# Patient Record
Sex: FEMALE | Race: WHITE | NOT HISPANIC OR LATINO | Employment: UNEMPLOYED | URBAN - METROPOLITAN AREA
[De-identification: names, ages, dates, MRNs, and addresses within clinical notes are randomized per-mention and may not be internally consistent; named-entity substitution may affect disease eponyms.]

---

## 2018-06-20 ENCOUNTER — CLINICAL SUPPORT (OUTPATIENT)
Dept: GENETICS | Facility: HOSPITAL | Age: 38
End: 2018-06-20

## 2018-06-20 ENCOUNTER — APPOINTMENT (OUTPATIENT)
Dept: LAB | Facility: HOSPITAL | Age: 38
End: 2018-06-20

## 2018-06-20 DIAGNOSIS — C50.919 MALIGNANT NEOPLASM OF FEMALE BREAST, UNSPECIFIED ESTROGEN RECEPTOR STATUS, UNSPECIFIED LATERALITY, UNSPECIFIED SITE OF BREAST (HCC): Primary | ICD-10-CM

## 2018-06-20 DIAGNOSIS — Z13.79 GENETIC TESTING: Primary | ICD-10-CM

## 2018-06-20 DIAGNOSIS — Z80.3 FAMILY HISTORY OF BREAST CANCER: ICD-10-CM

## 2018-06-20 PROCEDURE — 96040: CPT | Performed by: GENETIC COUNSELOR, MS

## 2018-06-20 NOTE — PROGRESS NOTES
Ina Ram is a 38-year old female who was seen for genetic counseling due to a recent breast cancer diagnosis and family history of cancer. Ms. Ram was diagnosed with an invasive ductal breast cancer at age 38. She has not made a surgical decision at this time. Her uterus and ovaries are still intact. Ms. Ram was interested in discussing her risk for a hereditary cancer syndrome, and decided to pursue genetic testing.   Ms. Ram opted to pursue comprehensive testing via the CancerNext panel ordered through [employer] which includes 34 genes that are known to increase the risk of breast cancer and other cancers (genes on this panel include APC, MIRACLE, BARD1, BMPR1A, BRCA1, BRCA2, BRIP1, CDH1, CDK4, CDKN2A, CHEK2, EPCAM, GREM1, MLH1, MRE11A, MSH2, MSH6, MUTYH, NBN, NF1, PALB2, PMS2, POLD1, POLE, PTEN, RAD50, RAD51C, RAD51D, SMAD4, SMARCA4, STK11, and TP53). Results are expected in 2-3 weeks.    PERTINENT FAMILY HISTORY: (See pedigree)   Pat. Cousin:  Breast cancer, 40  Mat. Uncle:  Possible melanoma      RISK ASSESSMENT:  Ms. Ram’s personal history of breast cancer in addition to a family history of breast cancer raises the question of a hereditary cancer syndrome. We discussed BRCA1/2 testing as well as the option of pursuing a panel that would test for other genes known to impact breast cancer risk in addition to BRCA1/2. This risk assessment is based on the family history information provided at the time of the appointment. The assessment could change in the future should new information be obtained.    GENETIC COUNSELING (40 minutes spent with patient): We reviewed the family history information in detail.  Cases of breast cancer follow three general patterns: sporadic, familial, and hereditary. While most cancer is sporadic, some cases appear to occur in family clusters.  These cases are said to be familial and account for 10-20% of breast cancer cases. Familial cases may be due to a  combination of shared genes and environmental factors among family members. In even fewer families, the cancer is said to be inherited, and the genes responsible for the cancer are known.      Family histories typical of hereditary cancer syndromes usually include multiple first- and second-degree relatives diagnosed with cancer types that define a syndrome. These cases tend to be diagnosed at younger-than-expected ages and can be bilateral or multifocal. The cancer in these families follows an autosomal dominant inheritance pattern, which indicates the likely presence of a mutation in a cancer susceptibility gene. Children and siblings of an individual believed to carry this mutation have a 50% chance of inheriting that mutation, thereby inheriting the increased risk to develop cancer.  These mutations can be passed down from the maternal or the paternal lineage.    Hereditary breast cancer accounts for 5-10% of all cases of breast cancer.  A significant proportion of hereditary breast cancer can be attributed to mutations in the BRCA1 and BRCA2 genes.  Mutations in these genes confer an increased risk for breast cancer, ovarian cancer, male breast cancer, prostate cancer and pancreatic cancer.  Women with a BRCA1 or BRCA2 mutation who have already been diagnosed with breast cancer have a 40-60% lifetime risk of a second breast cancer.     There are other genes that are known to be associated with an increased risk for breast cancer.  Some of these genes have well defined cancer risks and established management guidelines.  Other genes that can be tested for have been more recently described, and there may be less data regarding the risks and therefore may not have established management guidelines.  Based on Ms. Ram’s desire to get as much information as possible regarding her personal risks and potential risks for her family, she opted to pursue testing through a panel that would look at several other genes  known to increase the risk for breast cancer and other cancers.    Genetic Testing:  The risks, benefits and limitations of genetic testing and implications for clinical management following testing were reviewed.  DNA test results can influence decisions regarding screening, prevention and surgical management.  Genetic testing can have significant psychological implications for both individuals and families.  Also discussed was the possibility of employment and insurance discrimination based on genetic test results and the laws in place to prevent this (SANJIV), as well as the limitations of these laws.    We discussed panel testing, which would involve testing for BRCA1/2 as well as several other genes at the same time (other breast cancer related genes include MIRACLE, BARD1, BRIP1, CDH1, CHEK2, NBN, NF1, MRE11A, MUTYH, PALB2, PTEN, RAD50, RAD51C, RAD51D, TP53). The benefits and limitations of genetic testing were discussed and Ms. Ram decided to pursue testing via the panel. The implications of a positive or negative test result were discussed. We discussed the possibility that, in some cases, genetic test results may be ambiguous due to the identification of a genetic variant. These variants may or may not be associated with an increased cancer risk. Given her personal history, a negative test result does not eliminate all breast cancer risk to her relatives, although the risk would not be as high as it would with positive genetic testing.        PLAN: Genetic testing should be complete in 2-3 weeks, and Ms. Ram will be contacted by telephone to discuss the results. In the meantime Ms. Ram is welcome to contact us at [phone] with any questions she may have.        Samantha Mccray MS, [facility]       Certified Genetic Counselor

## 2018-06-20 NOTE — PROGRESS NOTES
Ina Ram is a 38-year old female who was seen for genetic counseling due to a recent breast cancer diagnosis and family history of cancer. Ms. Ram was diagnosed with an invasive ductal breast cancer at age 38. She has not made a surgical decision at this time. Her uterus and ovaries are still intact. Ms. Ram was interested in discussing her risk for a hereditary cancer syndrome, and decided to pursue genetic testing.   Ms. Ram opted to pursue comprehensive testing via the CancerNext panel ordered through [employer] which includes 34 genes that are known to increase the risk of breast cancer and other cancers (genes on this panel include APC, MIRACLE, BARD1, BMPR1A, BRCA1, BRCA2, BRIP1, CDH1, CDK4, CDKN2A, CHEK2, EPCAM, GREM1, MLH1, MRE11A, MSH2, MSH6, MUTYH, NBN, NF1, PALB2, PMS2, POLD1, POLE, PTEN, RAD50, RAD51C, RAD51D, SMAD4, SMARCA4, STK11, and TP53). Results are expected in 2-3 weeks.    PERTINENT FAMILY HISTORY: (See pedigree)   Pat. Cousin:  Breast cancer, 40  Mat. Uncle:  Possible melanoma      RISK ASSESSMENT:  Ms. Ram’s personal history of breast cancer in addition to the family history of breast cancer raises the question of a hereditary cancer syndrome. We discussed BRCA1/2 testing as well as the option of pursuing a panel that would test for other genes known to impact breast cancer risk in addition to BRCA1/2. This risk assessment is based on the family history information provided at the time of the appointment. The assessment could change in the future should new information be obtained.    GENETIC COUNSELING (40 minutes spent with patient): We reviewed the family history information in detail.  Cases of breast cancer follow three general patterns: sporadic, familial, and hereditary. While most cancer is sporadic, some cases appear to occur in family clusters.  These cases are said to be familial and account for 10-20% of breast cancer cases. Familial cases may be due to a  combination of shared genes and environmental factors among family members. In even fewer families, the cancer is said to be inherited, and the genes responsible for the cancer are known.      Family histories typical of hereditary cancer syndromes usually include multiple first- and second-degree relatives diagnosed with cancer types that define a syndrome. These cases tend to be diagnosed at younger-than-expected ages and can be bilateral or multifocal. The cancer in these families follows an autosomal dominant inheritance pattern, which indicates the likely presence of a mutation in a cancer susceptibility gene. Children and siblings of an individual believed to carry this mutation have a 50% chance of inheriting that mutation, thereby inheriting the increased risk to develop cancer.  These mutations can be passed down from the maternal or the paternal lineage.    Hereditary breast cancer accounts for 5-10% of all cases of breast cancer.  A significant proportion of hereditary breast cancer can be attributed to mutations in the BRCA1 and BRCA2 genes.  Mutations in these genes confer an increased risk for breast cancer, ovarian cancer, male breast cancer, prostate cancer and pancreatic cancer.  Women with a BRCA1 or BRCA2 mutation who have already been diagnosed with breast cancer have a 40-60% lifetime risk of a second breast cancer.     There are other genes that are known to be associated with an increased risk for breast cancer.  Some of these genes have well defined cancer risks and established management guidelines.  Other genes that can be tested for have been more recently described, and there may be less data regarding the risks and therefore may not have established management guidelines.  Based on Ms. Ram’s desire to get as much information as possible regarding her personal risks and potential risks for her family, she opted to pursue testing through a panel that would look at several other genes  known to increase the risk for breast cancer and other cancers.    Genetic Testing:  The risks, benefits and limitations of genetic testing and implications for clinical management following testing were reviewed.  DNA test results can influence decisions regarding screening, prevention and surgical management.  Genetic testing can have significant psychological implications for both individuals and families.  Also discussed was the possibility of employment and insurance discrimination based on genetic test results and the laws in place to prevent this (SANJIV), as well as the limitations of these laws.    We discussed panel testing, which would involve testing for BRCA1/2 as well as several other genes at the same time (other breast cancer related genes include MIRACLE, BARD1, BRIP1, CDH1, CHEK2, NBN, NF1, MRE11A, MUTYH, PALB2, PTEN, RAD50, RAD51C, RAD51D, TP53). The benefits and limitations of genetic testing were discussed and Ms. Ram decided to pursue testing via the panel. The implications of a positive or negative test result were discussed. We discussed the possibility that, in some cases, genetic test results may be ambiguous due to the identification of a genetic variant. These variants may or may not be associated with an increased cancer risk. Given her personal history, a negative test result does not eliminate all breast cancer risk to her relatives, although the risk would not be as high as it would with positive genetic testing.        PLAN: Genetic testing should be complete in 2-3 weeks, and Ms. Ram will be contacted by telephone to discuss the results. In the meantime Ms. Ram is welcome to contact us at [phone] with any questions she may have.        Alexandra Magnante      Genetic Counseling Student

## 2018-06-21 ENCOUNTER — DOCUMENTATION (OUTPATIENT)
Dept: OTHER | Facility: HOSPITAL | Age: 38
End: 2018-06-21

## 2018-06-21 NOTE — PROGRESS NOTES
6/26/18 I saw patient with Dr PHILIPPE  And patients cousin and sister-in-law. Dr PHILIPPE reviewed the pathology report -  Left breast cancer - HG IDC, ER positive, OR negative and HER 2 negative. The Tumor appears to be about 3cm and is close to the chest wall. An MRI has been ordered and the patient will see Dr Maldonado on Wednesday 6/27/18 @ 2pm. Dr PHILIPPE discussed jovi-adjuvant chemotherapy with the patient and placement of port. Educational and supportive materials were reviewed and given. Arm measurement completed. The patient will see genetics today at 2:30.

## 2018-06-25 ENCOUNTER — APPOINTMENT (OUTPATIENT)
Dept: OTHER | Facility: HOSPITAL | Age: 38
End: 2018-06-25
Attending: SURGERY

## 2018-06-25 ENCOUNTER — HOSPITAL ENCOUNTER (OUTPATIENT)
Dept: MRI IMAGING | Facility: HOSPITAL | Age: 38
Discharge: HOME | End: 2018-06-25
Attending: SURGERY | Admitting: SURGERY

## 2018-06-25 DIAGNOSIS — Z92.89 H/O MAMMOGRAM: ICD-10-CM

## 2018-06-25 DIAGNOSIS — C50.812 MALIGNANT NEOPLASM OF OVERLAPPING SITES OF LEFT FEMALE BREAST (HCC): ICD-10-CM

## 2018-06-25 PROCEDURE — 77059 MRI BREAST BILATERAL DIAGNOSTIC W WO CONTRAST: CPT | Performed by: RADIOLOGY

## 2018-06-25 PROCEDURE — 0159T PR BREAST MRI, COMPUTER AIDED DETECTION: CPT | Performed by: RADIOLOGY

## 2018-06-25 PROCEDURE — C8908 MRI W/O FOL W/CONT, BREAST,: HCPCS

## 2018-06-25 PROCEDURE — 0 GADOBENATE DIMEGLUMINE 529 MG/ML SOLUTION: Performed by: SURGERY

## 2018-06-25 PROCEDURE — A9577 INJ MULTIHANCE: HCPCS | Performed by: SURGERY

## 2018-06-25 RX ADMIN — GADOBENATE DIMEGLUMINE 14 ML: 529 INJECTION, SOLUTION INTRAVENOUS at 14:30

## 2018-06-26 ENCOUNTER — DOCUMENTATION (OUTPATIENT)
Dept: OTHER | Facility: HOSPITAL | Age: 38
End: 2018-06-26

## 2018-06-26 ENCOUNTER — TELEPHONE (OUTPATIENT)
Dept: MRI IMAGING | Facility: HOSPITAL | Age: 38
End: 2018-06-26

## 2018-06-26 ENCOUNTER — LAB REQUISITION (OUTPATIENT)
Dept: LAB | Facility: HOSPITAL | Age: 38
End: 2018-06-26

## 2018-06-26 DIAGNOSIS — C50.412 MALIGNANT NEOPLASM OF UPPER-OUTER QUADRANT OF LEFT FEMALE BREAST (HCC): ICD-10-CM

## 2018-06-26 PROCEDURE — 88321 CONSLTJ&REPRT SLD PREP ELSWR: CPT | Performed by: SURGERY

## 2018-06-26 NOTE — TELEPHONE ENCOUNTER
Called pt with Breast MRI results. Pt recommended to return for additional imaging and possible Stereo BX. I have this scheduled for 6/29 to arrive at 12:15 for a 12:45. Pt requested to have this done on Friday as she has another obligation on Wednesday the 27th.  Pt is not on BT. Pt to call with any further questions or concerns.

## 2018-06-26 NOTE — PROGRESS NOTES
Patients sister in law called to ask about getting patients scans that Dr Maldonado might order scheduled for Tomorrow while she is here seeing Dr Maldonado. I explained that the scans will need to be precerted and that Dr Maldonado will need to see the patient before he orders scans - she verbalized understanding.

## 2018-06-27 ENCOUNTER — EDUCATION (OUTPATIENT)
Dept: ONCOLOGY | Facility: HOSPITAL | Age: 38
End: 2018-06-27

## 2018-06-27 ENCOUNTER — DOCUMENTATION (OUTPATIENT)
Dept: OTHER | Facility: HOSPITAL | Age: 38
End: 2018-06-27

## 2018-06-27 ENCOUNTER — APPOINTMENT (OUTPATIENT)
Dept: MAMMOGRAPHY | Facility: HOSPITAL | Age: 38
End: 2018-06-27
Attending: SURGERY

## 2018-06-27 ENCOUNTER — CONSULT (OUTPATIENT)
Dept: ONCOLOGY | Facility: CLINIC | Age: 38
End: 2018-06-27

## 2018-06-27 ENCOUNTER — APPOINTMENT (OUTPATIENT)
Dept: ULTRASOUND IMAGING | Facility: HOSPITAL | Age: 38
End: 2018-06-27
Attending: SURGERY

## 2018-06-27 VITALS
TEMPERATURE: 98.5 F | DIASTOLIC BLOOD PRESSURE: 81 MMHG | HEIGHT: 69 IN | BODY MASS INDEX: 23.46 KG/M2 | HEART RATE: 70 BPM | SYSTOLIC BLOOD PRESSURE: 130 MMHG | RESPIRATION RATE: 16 BRPM | WEIGHT: 158.4 LBS | OXYGEN SATURATION: 100 %

## 2018-06-27 DIAGNOSIS — C50.011 MALIGNANT NEOPLASM OF NIPPLE OF RIGHT BREAST IN FEMALE, UNSPECIFIED ESTROGEN RECEPTOR STATUS (HCC): Primary | ICD-10-CM

## 2018-06-27 DIAGNOSIS — C50.412 MALIGNANT NEOPLASM OF UPPER-OUTER QUADRANT OF LEFT BREAST IN FEMALE, ESTROGEN RECEPTOR POSITIVE (HCC): ICD-10-CM

## 2018-06-27 DIAGNOSIS — Z17.0 MALIGNANT NEOPLASM OF UPPER-OUTER QUADRANT OF LEFT BREAST IN FEMALE, ESTROGEN RECEPTOR POSITIVE (HCC): ICD-10-CM

## 2018-06-27 PROCEDURE — 99205 OFFICE O/P NEW HI 60 MIN: CPT | Performed by: INTERNAL MEDICINE

## 2018-06-27 RX ORDER — SODIUM CHLORIDE 9 MG/ML
250 INJECTION, SOLUTION INTRAVENOUS ONCE
Status: CANCELLED | OUTPATIENT
Start: 2018-07-09

## 2018-06-27 RX ORDER — PALONOSETRON 0.05 MG/ML
0.25 INJECTION, SOLUTION INTRAVENOUS ONCE
Status: CANCELLED | OUTPATIENT
Start: 2018-07-09

## 2018-06-27 RX ORDER — DOXORUBICIN HYDROCHLORIDE 2 MG/ML
50 INJECTION, SOLUTION INTRAVENOUS ONCE
Status: CANCELLED | OUTPATIENT
Start: 2018-07-09

## 2018-06-27 NOTE — PROGRESS NOTES
ID: 38 y.o. year old female from [facility] [id number]    PCP: Bobbi Love MD    REFERRING PHYSICIAN: Dr. Josie Johnson    Reason for Consultation: Newly diagnosed weakly ER positive invasive ductal carcinoma of the left breast    Dear Dr. PHILIPPE    It is a pleasure to meet Ms. Ram today.  As you know she is a very pleasant 38-year-old lady who presents today for consultation due to newly diagnosed left breast cancer.  She recently found a palpable mass in the left breast and underwent further evaluation with a mammogram and a biopsy performed close to home in [facility].  The biopsy showed a high-grade invasive ductal carcinoma.  This was weakly positive for estrogen receptor and negative for progesterone receptor.  Her HER-2 immunohistochemical staining showed a 1+ staining pattern.  She denies any significant symptoms related to her cancer.  She denies any significant pain and no headaches, no abdominal discomfort or  weight loss.  She presents today after her MRI of the breasts reveal a torn muscle involvement.  She was referred to me for consideration of neoadjuvant chemotherapy.  She does not have any significant complicating medical issues other than her diagnosis of breast cancer.  Her social situation is complicated considering she has 4 children and also works as a speech therapist.      History reviewed. No pertinent past medical history.    Past Surgical History:   Procedure Laterality Date   • TONSILLECTOMY Bilateral 1985       Social History     Social History   • Marital status:      Social History Main Topics   • Smoking status: Never Smoker   • Smokeless tobacco: Never Used   • Alcohol use No   • Drug use: No   • Sexual activity: Defer     Other Topics Concern   • Not on file       Family History   Problem Relation Age of Onset   • No Known Problems Mother    • No Known Problems Father    • Breast cancer Cousin         BRCA, w METS to brain   • Prostate cancer Maternal  Grandfather        Review of Systems:    16 point review of systems was performed and reviewed and scanned into the EMR      Current Outpatient Prescriptions:   •  Acetaminophen (TYLENOL 8 HOUR PO), Take 1 tablet by mouth Daily As Needed., Disp: , Rfl:   •  IBUPROFEN PO, Take 1 tablet by mouth As Needed., Disp: , Rfl:   •  Multiple Vitamins-Minerals (MULTIVITAL PO), Take 1 tablet by mouth Daily., Disp: , Rfl:     No Known Allergies    ECOG SCORE: 0    Objective     Vitals:    06/27/18 1410   BP: 130/81   Pulse: 70   Resp: 16   Temp: 98.5 °F (36.9 °C)   SpO2: 100%       Physical Exam    General: well appearing, in no acute distress  HEENT: sclera anicteric, oropharynx clear, neck is supple  Lymphatics: no cervical, supraclavicular, or axillary adenopathy  Cardiovascular: regular rate and rhythm, no murmurs, rubs or gallops  Lungs: clear to auscultation bilaterally  Abdomen: soft, nontender, nondistended.  No palpable organomegaly  Extremities: no lower extremity edema  Skin: no rashes, lesions, bruising, or petechiae  Msk:  Shows no weakness of the large muscle groups  Psych: Mood is stable    Mri Breast Bilateral Diagnostic W Wo Contrast    Result Date: 6/27/2018  1. The biopsy-proven carcinoma corresponds to a 4.5 cm irregular heterogeneously enhancing mass in the posterior 3:00 distribution of the left breast. The mass involves the pectoralis muscle. A definite marking clip is not seen within the mass on the MR images and there were no post biopsy mammogram films obtained. Therefore, recommend the patient return for mammographic imaging of the left breast to see if a clip has been placed. If a marking clip was not placed then one will be placed at that time. 2. There is a cluster of calcifications in the right lower outer quadrant as described above which may be dermal in origin. However, tangential views were not obtained at the time of the patient's initial workup. Although there is no abnormal MR enhancement  noted on the right it is recommended that the patient return for additional mammographic imaging evaluation to include skin tangential views.  BI-RADS CATEGORY: 0, INCOMPLETE:  NEED ADDITIONAL IMAGING EVALUATION  RECOMMENDATIONS:   Recommend skin tangential views of previously noted calcifications involving the right breast. Also, left exaggerated lateral CC and MLO views to ascertain whether a clip was placed into the biopsy-proven carcinoma.        ASSESSMENT:    1.  Stage IIB high-grade weakly ER positive ductal carcinoma of the left breast.  I reviewed the MRI with her and her friends today.  I think she would be an ideal candidate for neoadjuvant chemotherapy.  I like to have a echo of her heart performed in anticipation of starting her on anthracycline and also a PET scan to make sure she doesn't have distant disease.  If her PET scan is negative then I will treat her with neoadjuvant TAC.  This is given every 3 weeks.  We discussed the common side effects to be expected from the treatment including alopecia, neuropathy, and risk of life threatening infections.  She will also need radiotherapy after surgery.  I anticipate 4-6 cycles of therapy depending on response.  Periodically I will have imaging performed to see where we stand with her disease.  She will have a port placed next week and I plan to treat her next Friday here in [place].  She prefers her treatment in [place].  She will also need to meet with genetic counselor prior to surgery.  He is able to answer all the questions she had for me today.  And I'll see her back my clinic in 1 week to go over the results of her testing.    I spent 60 minutes on the patient's plan and care with more than 50% of time spent counseling.      Thank you for allowing me to participate in the care of this patient.    Yours sincerely,    Noble Blancas MD  [facility]  Hematology and Oncology         Orders Placed This Encounter   Procedures   •  Comprehensive metabolic panel     Standing Status:   Future     Standing Expiration Date:   7/6/2019   • CBC and Differential     Standing Status:   Future     Standing Expiration Date:   7/6/2019     Order Specific Question:   Manual Differential     Answer:   No         Please note that portions of this note may have been completed with a voice recognition program. Efforts were made to edit the dictations, but occasionally words are mistranscribed.

## 2018-06-27 NOTE — PLAN OF CARE
Outpatient Infusion • [address] [facility] • [address] • [place] [id number] • [phone]      CHEMOTHERAPY EDUCATION SHEET    NAME:  Ina Ram      : 1980           DATE: 18    Booklets Given: Chemotherapy and You [x]  Eating Hints [x]    Sexuality/Fertility Books []     Chemotherapy/Biotherapy Education Sheets: (list all that apply)  Docetaxel, doxorubicin, cyclophosphamide, and pegfilgrastim                                                                                                                                                                Chemotherapy Regimen: docetaxel, doxorubicin, and cyclophosphamide every 21 days    TOPICS EDUCATION PROVIDED EDUCATION REINFORCED COMMENTS   ANEMIA:  role of RBC, cause, s/s, ways to manage, role of transfusion [x] [] Discussed the role and function of RBCs and that the patient may feel fatigued. Recommended to remain active during treatment.   THROMBOCYTOPENIA:  role of platelet, cause, s/s, ways to prevent bleeding, things to avoid, when to seek help [x] [] Discussed the role and function of platelets, and that the patient may be at an increased risk of bleeding.   NEUTROPENIA:  role of WBC, cause, infection precautions, s/s of infection, when to call MD [x] [] Discussed the role and function of WBCs, and that the patient may be at an increased risk of infection. Recommended to call MD if temperature greater than 100.4.   NUTRITION & APPETITE CHANGES:  importance of maintaining healthy diet & weight, ways to manage to improve intake, dietary consult, exercise regimen [x] [] Discussed that patient may develop a metallic taste, and to switch to plastic utensils if this occurs. Also discussed that water may taste differently, recommended the use of flavor additives. Counseled on the importance of hydration, and recommended 8-10 glasses of water daily.   DIARRHEA:  causes, s/s of dehydration, ways to manage, dietary changes, when to call MD [x]  [] Counseled on the use of loperamide, and recommended to contact MD if diarrhea persists despite use of anti-diarrheals.   CONSTIPATION:  causes, ways to manage, dietary changes, when to call MD [x] [] Discussed that constipation may occur, recommended docusate and/or Miralax.    NAUSEA & VOMITING:  cause, use of antiemetics, dietary changes, when to call MD [x] [] Counseled on pre-meds as well as home use of ondansetron, advised to contact MD if nausea persists despite use of anti-emetics.   MOUTH SORES:  causes, oral care, ways to manage [x] [] Discussed the possibility of mouth sores, recommended baking soda, salt, and water mouth wash. Advised to contact MD if mouth sores develop.   ALOPECIA:  cause, ways to manage, resources [x] [] Discussed the possibility of hair loss with this regimen, and the possibility of moving forward with a prescription for a wig.   INFERTILITY & SEXUALITY:  causes, fertility preservation options, sexuality changes, ways to manage, importance of birth control [x] [] Recommended the use of proper contraception for 48hrs after chemotherapy.    NERVOUS SYSTEM CHANGES:  causes, s/s, neuropathies, cognitive changes, ways to manage [x] [] Discussed the possibility of peripheral neuropathy, and to contact the MD if it occurs.   PAIN:  causes, ways to manage [] [] ????   SKIN & NAIL CHANGES:  cause, s/s, ways to manage [x] [] Discussed that the patient is at an increased risk of sunburn, counseled on the appropriate use of sunscreen.   ORGAN TOXICITIES:  cause, s/s, need for diagnostic tests, labs, when to notify MD [x] [] Discussed that we would be monitoring liver and kidney function, and blood counts periodically throughout chemotherapy.   SURVIVORSHIP:  distress, distress assessment, secondary malignancies, early/late effects, follow-up, social issues, social support [] []    HOME CARE:  use of spill kits, storing of PO chemo, how to manage bodily fluids [x] [] Discussed proper laundering  of soiled clothing and linens, and that those cleaning bodily fluids should wear gloves. Also discussed flushing the toilet twice after chemotherapy to help decrease exposure to others in the household.   MISCELLANEOUS:  drug interactions, administration, vesicant, et [x] [] Discussed the length of infusion, frequency of treatments, potential for dying of bodily fluids with doxorubicin, use of dexamethasone and EMLA cream at home, and that the patient was unlikely to experience hemorrhagic cystitis.     Referrals:    Notes: Gave the patient Nelly's business card and advise to call with any questions. The patient was anxious and had many questions related to her chemotherapy. She expressed interest in meeting with a dietician before chemotherapy begins. The patient expressed that all of her questions had been answered by the end of the session, but was still unsure whether she wanted to receive her chemotherapy in [place] or [place]. Additionally the patient inquired about the use of a new prescription for alprazolam. Encouraged the patient to have a  if she decided to take a dose before or during chemotherapy. Additionally, the patient expressed an interest to speak to another representative from pharmacy to go over the education again before her first infusion.

## 2018-06-28 ENCOUNTER — DOCUMENTATION (OUTPATIENT)
Dept: GENETICS | Facility: HOSPITAL | Age: 38
End: 2018-06-28

## 2018-06-29 ENCOUNTER — HOSPITAL ENCOUNTER (OUTPATIENT)
Dept: MAMMOGRAPHY | Facility: HOSPITAL | Age: 38
Discharge: HOME | End: 2018-06-29
Attending: SURGERY

## 2018-06-29 ENCOUNTER — HOSPITAL ENCOUNTER (OUTPATIENT)
Dept: MAMMOGRAPHY | Facility: HOSPITAL | Age: 38
Discharge: HOME | End: 2018-06-29
Attending: SURGERY | Admitting: SURGERY

## 2018-06-29 ENCOUNTER — HOSPITAL ENCOUNTER (OUTPATIENT)
Dept: ULTRASOUND IMAGING | Facility: HOSPITAL | Age: 38
Discharge: HOME | End: 2018-06-29
Attending: SURGERY

## 2018-06-29 ENCOUNTER — DOCUMENTATION (OUTPATIENT)
Dept: OTHER | Facility: HOSPITAL | Age: 38
End: 2018-06-29

## 2018-06-29 DIAGNOSIS — C50.812 MALIGNANT NEOPLASM OF OVERLAPPING SITES OF LEFT FEMALE BREAST (HCC): ICD-10-CM

## 2018-06-29 PROCEDURE — G0279 TOMOSYNTHESIS, MAMMO: HCPCS

## 2018-06-29 PROCEDURE — 77066 DX MAMMO INCL CAD BI: CPT | Performed by: RADIOLOGY

## 2018-06-29 PROCEDURE — 77066 DX MAMMO INCL CAD BI: CPT

## 2018-06-29 PROCEDURE — 77062 BREAST TOMOSYNTHESIS BI: CPT | Performed by: RADIOLOGY

## 2018-06-29 NOTE — PROGRESS NOTES
Patient called to say that she saw her dentist, Dr. Dre Aceves in [facility]. She has 2 cracked teeth that Dr Aceves said had root canals previously and would be okay. She has a cavity that Dr Aceves said he would not be able to save and would need to be extracted at some point. The patient stated that the tooth does not hurt her nor does it seem to be infected - I talked to Dr Maldonado and he said it would be okay for patient to have tooth extracted after her first infusion on 7/9/18 but before her second infusion. I will notify the patient.

## 2018-07-02 ENCOUNTER — HOSPITAL ENCOUNTER (OUTPATIENT)
Dept: PET IMAGING | Facility: HOSPITAL | Age: 38
Discharge: HOME | End: 2018-07-02
Attending: INTERNAL MEDICINE

## 2018-07-02 ENCOUNTER — APPOINTMENT (OUTPATIENT)
Dept: PET IMAGING | Facility: HOSPITAL | Age: 38
End: 2018-07-02
Attending: INTERNAL MEDICINE

## 2018-07-02 ENCOUNTER — HOSPITAL ENCOUNTER (OUTPATIENT)
Dept: CARDIOLOGY | Facility: HOSPITAL | Age: 38
Discharge: HOME | End: 2018-07-02
Attending: INTERNAL MEDICINE | Admitting: INTERNAL MEDICINE

## 2018-07-02 ENCOUNTER — TELEPHONE (OUTPATIENT)
Dept: SOCIAL WORK | Facility: HOSPITAL | Age: 38
End: 2018-07-02

## 2018-07-02 ENCOUNTER — DOCUMENTATION (OUTPATIENT)
Dept: GENETICS | Facility: HOSPITAL | Age: 38
End: 2018-07-02

## 2018-07-02 DIAGNOSIS — C50.011 MALIGNANT NEOPLASM OF NIPPLE OF RIGHT BREAST IN FEMALE, UNSPECIFIED ESTROGEN RECEPTOR STATUS (HCC): ICD-10-CM

## 2018-07-02 LAB
BH CV ECHO MEAS - AO ROOT AREA (BSA CORRECTED): 1.3
BH CV ECHO MEAS - AO ROOT AREA: 4.6 CM^2
BH CV ECHO MEAS - AO ROOT DIAM: 2.4 CM
BH CV ECHO MEAS - BSA(HAYCOCK): 1.9 M^2
BH CV ECHO MEAS - BSA: 1.9 M^2
BH CV ECHO MEAS - BZI_BMI: 23.3 KILOGRAMS/M^2
BH CV ECHO MEAS - BZI_METRIC_HEIGHT: 175.3 CM
BH CV ECHO MEAS - BZI_METRIC_WEIGHT: 71.7 KG
BH CV ECHO MEAS - EDV(CUBED): 83.1 ML
BH CV ECHO MEAS - EDV(TEICH): 86 ML
BH CV ECHO MEAS - EF(CUBED): 78.6 %
BH CV ECHO MEAS - EF(TEICH): 71.1 %
BH CV ECHO MEAS - ESV(CUBED): 17.8 ML
BH CV ECHO MEAS - ESV(TEICH): 24.8 ML
BH CV ECHO MEAS - FS: 40.2 %
BH CV ECHO MEAS - IVS/LVPW: 1
BH CV ECHO MEAS - IVSD: 0.84 CM
BH CV ECHO MEAS - LA DIMENSION: 2.4 CM
BH CV ECHO MEAS - LA/AO: 0.98
BH CV ECHO MEAS - LV MASS(C)D: 113.7 GRAMS
BH CV ECHO MEAS - LV MASS(C)DI: 60.8 GRAMS/M^2
BH CV ECHO MEAS - LVIDD: 4.4 CM
BH CV ECHO MEAS - LVIDS: 2.6 CM
BH CV ECHO MEAS - LVPWD: 0.82 CM
BH CV ECHO MEAS - MV A MAX VEL: 44.9 CM/SEC
BH CV ECHO MEAS - MV DEC SLOPE: 403.4 CM/SEC^2
BH CV ECHO MEAS - MV DEC TIME: 0.21 SEC
BH CV ECHO MEAS - MV E MAX VEL: 92.8 CM/SEC
BH CV ECHO MEAS - MV E/A: 2.1
BH CV ECHO MEAS - MV P1/2T MAX VEL: 108.6 CM/SEC
BH CV ECHO MEAS - MV P1/2T: 78.9 MSEC
BH CV ECHO MEAS - MVA P1/2T LCG: 2 CM^2
BH CV ECHO MEAS - MVA(P1/2T): 2.8 CM^2
BH CV ECHO MEAS - PA ACC SLOPE: 564.1 CM/SEC^2
BH CV ECHO MEAS - PA ACC TIME: 0.15 SEC
BH CV ECHO MEAS - PA PR(ACCEL): 12.5 MMHG
BH CV ECHO MEAS - RAP SYSTOLE: 3 MMHG
BH CV ECHO MEAS - RV MAX PG: 1 MMHG
BH CV ECHO MEAS - RV V1 MAX: 50.8 CM/SEC
BH CV ECHO MEAS - RVSP: 12 MMHG
BH CV ECHO MEAS - SI(CUBED): 35 ML/M^2
BH CV ECHO MEAS - SI(TEICH): 32.7 ML/M^2
BH CV ECHO MEAS - SV(CUBED): 65.4 ML
BH CV ECHO MEAS - SV(TEICH): 61.2 ML
BH CV ECHO MEAS - TAPSE (>1.6): 2.2 CM2
BH CV ECHO MEAS - TR MAX VEL: 145.4 CM/SEC
BH CV XLRA - RV BASE: 3.2 CM
BH CV XLRA - RV LENGTH: 6.1 CM
BH CV XLRA - RV MID: 2.4 CM
BH CV XLRA - TDI S': 13.3 CM/SEC
GLUCOSE BLDC GLUCOMTR-MCNC: 84 MG/DL (ref 70–130)
LEFT ATRIUM VOLUME INDEX: 16 ML/M2
LEFT ATRIUM VOLUME: 30 CM3
LV EF 2D ECHO EST: 65 %
MAXIMAL PREDICTED HEART RATE: 182 BPM
STRESS TARGET HR: 155 BPM

## 2018-07-02 PROCEDURE — 0 FLUDEOXYGLUCOSE F18 SOLUTION: Performed by: INTERNAL MEDICINE

## 2018-07-02 PROCEDURE — A9552 F18 FDG: HCPCS | Performed by: INTERNAL MEDICINE

## 2018-07-02 PROCEDURE — 93306 TTE W/DOPPLER COMPLETE: CPT | Performed by: INTERNAL MEDICINE

## 2018-07-02 PROCEDURE — 82962 GLUCOSE BLOOD TEST: CPT

## 2018-07-02 PROCEDURE — 93306 TTE W/DOPPLER COMPLETE: CPT

## 2018-07-02 PROCEDURE — 78815 PET IMAGE W/CT SKULL-THIGH: CPT

## 2018-07-02 RX ADMIN — FLUDEOXYGLUCOSE F18 1 DOSE: 300 INJECTION INTRAVENOUS at 13:59

## 2018-07-02 NOTE — TELEPHONE ENCOUNTER
ABELARDO received a phone call from pt cousin, Marcia, inquiring about financial and support resources that may be available for pt.  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  Hers is the sole income in the household.  Pt does not have any benefits from her employer.  Pt  is currently unable to work and has no income.  SW provided information about applying for Social Security disability to determine if pt condition warrants that.  ABELARDO discussed various financial resources and will go over in further detail at pt first chemo treatment.  ABELARDO informed of various resources for wigs and scarves, etc.  Marcia requested for pt to stay at [facility] for her appointment on Friday.  ABELARDO completed and faxed in referral for 7/5-7/6.  ABELARDO informed of various support services that are available at [facility] as well.  ABELARDO encouraged for pt to apply at local Medicaid office for any assistance for which she and her family may be eligible.  Pt has a supportive group of family and friends.  They will be assisting her with meals, taking her children to their activities and appointments, and bringing her to her medical appointments.  ABELARDO encouraged pt or her family members to call at any time with further questions or concerns.  SW available for ongoing support and resource needs.

## 2018-07-03 ENCOUNTER — TRANSCRIBE ORDERS (OUTPATIENT)
Dept: MAMMOGRAPHY | Facility: HOSPITAL | Age: 38
End: 2018-07-03

## 2018-07-03 DIAGNOSIS — R92.8 ABNORMAL MAMMOGRAM: Primary | ICD-10-CM

## 2018-07-05 ENCOUNTER — TRANSCRIBE ORDERS (OUTPATIENT)
Dept: ADMINISTRATIVE | Facility: HOSPITAL | Age: 38
End: 2018-07-05

## 2018-07-05 DIAGNOSIS — C50.812 MALIGNANT NEOPLASM OF OVERLAPPING SITES OF LEFT FEMALE BREAST, UNSPECIFIED ESTROGEN RECEPTOR STATUS (HCC): Primary | ICD-10-CM

## 2018-07-05 LAB
CYTO UR: NORMAL
LAB AP CASE REPORT: NORMAL
LAB AP DIAGNOSIS COMMENT: NORMAL
PATH REPORT.FINAL DX SPEC: NORMAL
PATH REPORT.GROSS SPEC: NORMAL

## 2018-07-06 ENCOUNTER — HOSPITAL ENCOUNTER (OUTPATIENT)
Dept: GENERAL RADIOLOGY | Facility: HOSPITAL | Age: 38
Discharge: HOME | End: 2018-07-06
Attending: SURGERY

## 2018-07-06 ENCOUNTER — HOSPITAL ENCOUNTER (OUTPATIENT)
Dept: ULTRASOUND IMAGING | Facility: HOSPITAL | Age: 38
Discharge: HOME | End: 2018-07-06
Attending: SURGERY | Admitting: RADIOLOGY

## 2018-07-06 ENCOUNTER — HOSPITAL ENCOUNTER (OUTPATIENT)
Dept: MAMMOGRAPHY | Facility: HOSPITAL | Age: 38
Discharge: HOME | End: 2018-07-06

## 2018-07-06 DIAGNOSIS — R92.8 ABNORMAL MAMMOGRAM: ICD-10-CM

## 2018-07-06 DIAGNOSIS — Z17.0 MALIGNANT NEOPLASM OF UPPER-OUTER QUADRANT OF LEFT BREAST IN FEMALE, ESTROGEN RECEPTOR POSITIVE (HCC): Primary | ICD-10-CM

## 2018-07-06 DIAGNOSIS — C50.412 MALIGNANT NEOPLASM OF UPPER-OUTER QUADRANT OF LEFT BREAST IN FEMALE, ESTROGEN RECEPTOR POSITIVE (HCC): ICD-10-CM

## 2018-07-06 DIAGNOSIS — Z17.0 MALIGNANT NEOPLASM OF UPPER-OUTER QUADRANT OF LEFT BREAST IN FEMALE, ESTROGEN RECEPTOR POSITIVE (HCC): ICD-10-CM

## 2018-07-06 DIAGNOSIS — C50.812 MALIGNANT NEOPLASM OF OVERLAPPING SITES OF LEFT FEMALE BREAST, UNSPECIFIED ESTROGEN RECEPTOR STATUS (HCC): ICD-10-CM

## 2018-07-06 DIAGNOSIS — C50.412 MALIGNANT NEOPLASM OF UPPER-OUTER QUADRANT OF LEFT BREAST IN FEMALE, ESTROGEN RECEPTOR POSITIVE (HCC): Primary | ICD-10-CM

## 2018-07-06 PROCEDURE — 19285 PERQ DEV BREAST 1ST US IMAG: CPT | Performed by: RADIOLOGY

## 2018-07-06 PROCEDURE — 77065 DX MAMMO INCL CAD UNI: CPT | Performed by: RADIOLOGY

## 2018-07-06 PROCEDURE — 71045 X-RAY EXAM CHEST 1 VIEW: CPT

## 2018-07-06 PROCEDURE — A4648 IMPLANTABLE TISSUE MARKER: HCPCS

## 2018-07-06 PROCEDURE — 76642 ULTRASOUND BREAST LIMITED: CPT

## 2018-07-06 RX ORDER — DEXAMETHASONE 4 MG/1
TABLET ORAL
Qty: 12 TABLET | Refills: 3 | Status: SHIPPED | OUTPATIENT
Start: 2018-07-06 | End: 2018-07-06 | Stop reason: SDUPTHER

## 2018-07-06 RX ORDER — LIDOCAINE HYDROCHLORIDE 10 MG/ML
5 INJECTION, SOLUTION INFILTRATION; PERINEURAL ONCE
Status: COMPLETED | OUTPATIENT
Start: 2018-07-06 | End: 2018-07-06

## 2018-07-06 RX ORDER — DEXAMETHASONE 4 MG/1
TABLET ORAL
Qty: 12 TABLET | Refills: 5 | Status: SHIPPED | OUTPATIENT
Start: 2018-07-06 | End: 2018-11-26

## 2018-07-06 RX ORDER — LIDOCAINE AND PRILOCAINE 25; 25 MG/G; MG/G
CREAM TOPICAL AS NEEDED
Qty: 30 G | Refills: 5 | Status: SHIPPED | OUTPATIENT
Start: 2018-07-06 | End: 2019-06-21

## 2018-07-06 RX ORDER — LIDOCAINE AND PRILOCAINE 25; 25 MG/G; MG/G
CREAM TOPICAL AS NEEDED
Qty: 30 G | Refills: 5 | Status: SHIPPED | OUTPATIENT
Start: 2018-07-06 | End: 2018-07-06 | Stop reason: SDUPTHER

## 2018-07-06 RX ORDER — ONDANSETRON HYDROCHLORIDE 8 MG/1
8 TABLET, FILM COATED ORAL 3 TIMES DAILY PRN
Qty: 30 TABLET | Refills: 5 | Status: SHIPPED | OUTPATIENT
Start: 2018-07-06 | End: 2018-07-06 | Stop reason: SDUPTHER

## 2018-07-06 RX ORDER — DEXAMETHASONE 4 MG/1
TABLET ORAL
Qty: 12 TABLET | Refills: 5 | Status: SHIPPED | OUTPATIENT
Start: 2018-07-06 | End: 2018-07-06 | Stop reason: SDUPTHER

## 2018-07-06 RX ORDER — ONDANSETRON HYDROCHLORIDE 8 MG/1
8 TABLET, FILM COATED ORAL 3 TIMES DAILY PRN
Qty: 30 TABLET | Refills: 5 | Status: SHIPPED | OUTPATIENT
Start: 2018-07-06 | End: 2018-11-26

## 2018-07-06 RX ADMIN — LIDOCAINE HYDROCHLORIDE 2 ML: 10 INJECTION, SOLUTION INFILTRATION; PERINEURAL at 15:18

## 2018-07-09 ENCOUNTER — INFUSION (OUTPATIENT)
Dept: ONCOLOGY | Facility: HOSPITAL | Age: 38
End: 2018-07-09

## 2018-07-09 ENCOUNTER — EDUCATION (OUTPATIENT)
Dept: ONCOLOGY | Facility: HOSPITAL | Age: 38
End: 2018-07-09

## 2018-07-09 ENCOUNTER — DOCUMENTATION (OUTPATIENT)
Dept: NUTRITION | Facility: HOSPITAL | Age: 38
End: 2018-07-09

## 2018-07-09 ENCOUNTER — DOCUMENTATION (OUTPATIENT)
Dept: SOCIAL WORK | Facility: HOSPITAL | Age: 38
End: 2018-07-09

## 2018-07-09 VITALS
RESPIRATION RATE: 16 BRPM | BODY MASS INDEX: 23.11 KG/M2 | DIASTOLIC BLOOD PRESSURE: 68 MMHG | SYSTOLIC BLOOD PRESSURE: 136 MMHG | HEIGHT: 69 IN | WEIGHT: 156 LBS | TEMPERATURE: 99.2 F | HEART RATE: 94 BPM

## 2018-07-09 DIAGNOSIS — C50.412 MALIGNANT NEOPLASM OF UPPER-OUTER QUADRANT OF LEFT BREAST IN FEMALE, ESTROGEN RECEPTOR POSITIVE (HCC): Primary | ICD-10-CM

## 2018-07-09 DIAGNOSIS — Z17.0 MALIGNANT NEOPLASM OF UPPER-OUTER QUADRANT OF LEFT BREAST IN FEMALE, ESTROGEN RECEPTOR POSITIVE (HCC): Primary | ICD-10-CM

## 2018-07-09 LAB
ALBUMIN SERPL-MCNC: 4.53 G/DL (ref 3.2–4.8)
ALBUMIN/GLOB SERPL: 1.8 G/DL (ref 1.5–2.5)
ALP SERPL-CCNC: 50 U/L (ref 25–100)
ALT SERPL W P-5'-P-CCNC: 16 U/L (ref 7–40)
ANION GAP SERPL CALCULATED.3IONS-SCNC: 7 MMOL/L (ref 3–11)
AST SERPL-CCNC: 12 U/L (ref 0–33)
BILIRUB SERPL-MCNC: 0.3 MG/DL (ref 0.3–1.2)
BUN BLD-MCNC: 8 MG/DL (ref 9–23)
BUN/CREAT SERPL: 12.7 (ref 7–25)
CALCIUM SPEC-SCNC: 9.4 MG/DL (ref 8.7–10.4)
CHLORIDE SERPL-SCNC: 108 MMOL/L (ref 99–109)
CO2 SERPL-SCNC: 24 MMOL/L (ref 20–31)
CREAT BLD-MCNC: 0.63 MG/DL (ref 0.6–1.3)
ERYTHROCYTE [DISTWIDTH] IN BLOOD BY AUTOMATED COUNT: 12.9 % (ref 11.3–14.5)
GFR SERPL CREATININE-BSD FRML MDRD: 106 ML/MIN/1.73
GLOBULIN UR ELPH-MCNC: 2.5 GM/DL
GLUCOSE BLD-MCNC: 193 MG/DL (ref 70–100)
HCT VFR BLD AUTO: 35.8 % (ref 34.5–44)
HGB BLD-MCNC: 11.9 G/DL (ref 11.5–15.5)
LYMPHOCYTES # BLD AUTO: 0.9 10*3/MM3 (ref 0.6–4.8)
LYMPHOCYTES NFR BLD AUTO: 5.4 % (ref 24–44)
MCH RBC QN AUTO: 29.6 PG (ref 27–31)
MCHC RBC AUTO-ENTMCNC: 33.2 G/DL (ref 32–36)
MCV RBC AUTO: 89.1 FL (ref 80–99)
MONOCYTES # BLD AUTO: 0.3 10*3/MM3 (ref 0–1)
MONOCYTES NFR BLD AUTO: 1.7 % (ref 0–12)
NEUTROPHILS # BLD AUTO: 15 10*3/MM3 (ref 1.5–8.3)
NEUTROPHILS NFR BLD AUTO: 92.9 % (ref 41–71)
PLATELET # BLD AUTO: 200 10*3/MM3 (ref 150–450)
PMV BLD AUTO: 8.6 FL (ref 6–12)
POTASSIUM BLD-SCNC: 3.6 MMOL/L (ref 3.5–5.5)
PROT SERPL-MCNC: 7 G/DL (ref 5.7–8.2)
RBC # BLD AUTO: 4.02 10*6/MM3 (ref 3.89–5.14)
SODIUM BLD-SCNC: 139 MMOL/L (ref 132–146)
WBC NRBC COR # BLD: 16.2 10*3/MM3 (ref 3.5–10.8)

## 2018-07-09 PROCEDURE — 25010000002 DOXORUBICIN PER 10 MG: Performed by: INTERNAL MEDICINE

## 2018-07-09 PROCEDURE — 96417 CHEMO IV INFUS EACH ADDL SEQ: CPT

## 2018-07-09 PROCEDURE — 25010000002 FOSAPREPITANT PER 1 MG: Performed by: INTERNAL MEDICINE

## 2018-07-09 PROCEDURE — 96411 CHEMO IV PUSH ADDL DRUG: CPT

## 2018-07-09 PROCEDURE — 25010000002 PEGFILGRASTIM 6 MG/0.6ML PREFILLED SYRINGE KIT: Performed by: INTERNAL MEDICINE

## 2018-07-09 PROCEDURE — 85025 COMPLETE CBC W/AUTO DIFF WBC: CPT

## 2018-07-09 PROCEDURE — 25010000002 CYCLOPHOSPHAMIDE PER 100 MG: Performed by: INTERNAL MEDICINE

## 2018-07-09 PROCEDURE — 96375 TX/PRO/DX INJ NEW DRUG ADDON: CPT

## 2018-07-09 PROCEDURE — 25010000002 HEPARIN FLUSH (PORCINE) 100 UNIT/ML SOLUTION: Performed by: INTERNAL MEDICINE

## 2018-07-09 PROCEDURE — 25010000002 PALONOSETRON PER 25 MCG: Performed by: INTERNAL MEDICINE

## 2018-07-09 PROCEDURE — 96366 THER/PROPH/DIAG IV INF ADDON: CPT

## 2018-07-09 PROCEDURE — 80053 COMPREHEN METABOLIC PANEL: CPT

## 2018-07-09 PROCEDURE — 25010000002 DOCETAXEL 20 MG/ML SOLUTION 1 ML VIAL: Performed by: INTERNAL MEDICINE

## 2018-07-09 PROCEDURE — 96415 CHEMO IV INFUSION ADDL HR: CPT

## 2018-07-09 PROCEDURE — 96377 APPLICATON ON-BODY INJECTOR: CPT

## 2018-07-09 PROCEDURE — 96413 CHEMO IV INFUSION 1 HR: CPT

## 2018-07-09 PROCEDURE — 25010000002 DOCETAXEL 20 MG/ML SOLUTION 4 ML VIAL: Performed by: INTERNAL MEDICINE

## 2018-07-09 PROCEDURE — 86300 IMMUNOASSAY TUMOR CA 15-3: CPT

## 2018-07-09 PROCEDURE — 96367 TX/PROPH/DG ADDL SEQ IV INF: CPT

## 2018-07-09 RX ORDER — SODIUM CHLORIDE 0.9 % (FLUSH) 0.9 %
10 SYRINGE (ML) INJECTION AS NEEDED
Status: DISCONTINUED | OUTPATIENT
Start: 2018-07-09 | End: 2018-07-09 | Stop reason: HOSPADM

## 2018-07-09 RX ORDER — SODIUM CHLORIDE 9 MG/ML
250 INJECTION, SOLUTION INTRAVENOUS ONCE
Status: COMPLETED | OUTPATIENT
Start: 2018-07-09 | End: 2018-07-09

## 2018-07-09 RX ORDER — DOXORUBICIN HYDROCHLORIDE 2 MG/ML
50 INJECTION, SOLUTION INTRAVENOUS ONCE
Status: COMPLETED | OUTPATIENT
Start: 2018-07-09 | End: 2018-07-09

## 2018-07-09 RX ORDER — PALONOSETRON 0.05 MG/ML
0.25 INJECTION, SOLUTION INTRAVENOUS ONCE
Status: COMPLETED | OUTPATIENT
Start: 2018-07-09 | End: 2018-07-09

## 2018-07-09 RX ADMIN — SODIUM CHLORIDE 150 MG: 9 INJECTION, SOLUTION INTRAVENOUS at 11:59

## 2018-07-09 RX ADMIN — Medication 10 ML: at 15:06

## 2018-07-09 RX ADMIN — PEGFILGRASTIM 6 MG: KIT SUBCUTANEOUS at 14:49

## 2018-07-09 RX ADMIN — DOXORUBICIN HYDROCHLORIDE 94 MG: 2 INJECTION, SOLUTION INTRAVENOUS at 12:54

## 2018-07-09 RX ADMIN — CYCLOPHOSPHAMIDE 940 MG: 1 INJECTION, POWDER, FOR SOLUTION INTRAVENOUS; ORAL at 13:14

## 2018-07-09 RX ADMIN — DOCETAXEL 140 MG: 20 INJECTION, SOLUTION, CONCENTRATE INTRAVENOUS at 13:58

## 2018-07-09 RX ADMIN — PALONOSETRON HYDROCHLORIDE 0.25 MG: 0.25 INJECTION INTRAVENOUS at 11:57

## 2018-07-09 RX ADMIN — HEPARIN 500 UNITS: 100 SYRINGE at 15:06

## 2018-07-09 RX ADMIN — SODIUM CHLORIDE 250 ML: 9 INJECTION, SOLUTION INTRAVENOUS at 11:54

## 2018-07-09 NOTE — PROGRESS NOTES
SW met with pt during her first infusion to provide support and resources.  Pt lives in [place], [place] and has four children.  Pt has good support from her family and friends.  Pt is not currently able to work.  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  SW provided support to pt and informed her of the role of oncology social work.  ABELARDO provided a personal health manager from [employer], as well as referral information for Cancer Care and [place] [employer] for immediate financial assistance, information about wigs and head coverings.  SW provided contact information for future needs or concerns.  SW available for ongoing support and resource needs.

## 2018-07-09 NOTE — PLAN OF CARE
Outpatient Infusion • [address] [facility] • [address] • [place] [id number] • [phone]      CHEMOTHERAPY EDUCATION SHEET    NAME:  Ina Ram      : 1980           DATE: 18    Booklets Given: Chemotherapy and You []  Eating Hints []    Sexuality/Fertility Books []     Chemotherapy/Biotherapy Education Sheets: (list all that apply)                                                                                                                                                                Chemotherapy Regimen: docetaxel, doxorubicin, cyclophosphamide every 21 days    TOPICS EDUCATION PROVIDED EDUCATION REINFORCED COMMENTS   ANEMIA:  role of RBC, cause, s/s, ways to manage, role of transfusion [x] [] Discussed the role and function of RBCs and that the patient may feel fatigued. Recommended to remain active during treatment.   THROMBOCYTOPENIA:  role of platelet, cause, s/s, ways to prevent bleeding, things to avoid, when to seek help [x] [] Discussed the role and function of platelets, and that the patient may be at an increased risk of bleeding.   NEUTROPENIA:  role of WBC, cause, infection precautions, s/s of infection, when to call MD [x] [] Discussed the role and function of WBCs, and that the patient may be at an increased risk of infection. Recommended to call MD if temperature greater than 100.4.   NUTRITION & APPETITE CHANGES:  importance of maintaining healthy diet & weight, ways to manage to improve intake, dietary consult, exercise regimen [x] [] Discussed that patient may develop a metallic taste, and to switch to plastic utensils if this occurs. Also discussed that water may taste differently, recommended the use of flavor additives. Counseled on the importance of hydration, and recommended 8-10 glasses of water daily.   DIARRHEA:  causes, s/s of dehydration, ways to manage, dietary changes, when to call MD [x] [] Counseled on the use of loperamide, and recommended to contact  MD if diarrhea persists despite use of anti-diarrheals.   CONSTIPATION:  causes, ways to manage, dietary changes, when to call MD [x] [] Discussed that constipation may occur, recommended docusate and/or Miralax.    NAUSEA & VOMITING:  cause, use of antiemetics, dietary changes, when to call MD [x] [] Discussed pre meds, home use of ondansetron, and when to contact MD.   MOUTH SORES:  causes, oral care, ways to manage [x] [] Discussed the possibility of mouth sores, recommended baking soda, salt, and water mouth wash. Advised to contact MD if mouth sores develop.   ALOPECIA:  cause, ways to manage, resources [x] [] Discussed possibility of hair loss with chemotherapy, and possibility of a prescription for a wig.   INFERTILITY & SEXUALITY:  causes, fertility preservation options, sexuality changes, ways to manage, importance of birth control [x] [] Recommended the use of proper contraception for 48hrs after chemotherapy.    NERVOUS SYSTEM CHANGES:  causes, s/s, neuropathies, cognitive changes, ways to manage [x] [] Patient reported recent tingling in fingers, advised that is likely a side effect of dexamethasone. Advised to report if it continues after last dose of dexamethasone on 07/10.   PAIN:  causes, ways to manage [] [] ????   SKIN & NAIL CHANGES:  cause, s/s, ways to manage [x] [] Discussed that the patient is at an increased risk of sunburn, counseled on the appropriate use of sunscreen.   ORGAN TOXICITIES:  cause, s/s, need for diagnostic tests, labs, when to notify MD [x] [] Discussed we would be monitoring liver and kidney function, as well as blood counts periodically. Also discussed need for ECHO monitoring.   SURVIVORSHIP:  distress, distress assessment, secondary malignancies, early/late effects, follow-up, social issues, social support [] []    HOME CARE:  use of spill kits, storing of PO chemo, how to manage bodily fluids [x] [] Discussed proper laundering of soiled linens and clothing, as well as  appropriate cleaning of bodily fluids.   MISCELLANEOUS:  drug interactions, administration, vesicant, et [x] [] Discussed length of infusion, frequency of treatment, potential for red bodily fluids with doxorubicin, and use of loratadine with pegfilgrastim.     Referrals:     Notes: Gave Nelly's business card and advised to contact with any questions or concerns.

## 2018-07-10 LAB — CANCER AG27-29 SERPL-ACNC: 22.2 U/ML (ref 0–38.6)

## 2018-07-11 NOTE — PROGRESS NOTES
Oncology Nutrition Screening    Patient Name:  Ina Ram  YOB: 1980  MRN: [id number]  Date:  07/11/18  Physician:  Dr. Maldonado    Type of Cancer Treatment:   Chemotherapy:  Neoadjuvant TAC - every 21 days     Patient Active Problem List   Diagnosis   • Malignant neoplasm of upper-outer quadrant of left breast in female, estrogen receptor positive (CMS/HCC)       Current Outpatient Prescriptions   Medication Sig Dispense Refill   • Acetaminophen (TYLENOL 8 HOUR PO) Take 1 tablet by mouth Daily As Needed.     • dexamethasone (DECADRON) 4 MG tablet Take 2 tablets oral twice a day for 3 consecutive days beginning the day before chemotherapy and continue for 6 doses. 12 tablet 5   • IBUPROFEN PO Take 1 tablet by mouth As Needed.     • lidocaine-prilocaine (EMLA) 2.5-2.5 % cream Apply  topically As Needed for Mild Pain . Apply small amount of port area on skin 1 hour before access 30 g 5   • Multiple Vitamins-Minerals (MULTIVITAL PO) Take 1 tablet by mouth Daily.     • ondansetron (ZOFRAN) 8 MG tablet Take 1 tablet by mouth 3 (Three) Times a Day As Needed for Nausea or Vomiting. 30 tablet 5     No current facility-administered medications for this visit.        Glycemic Risk:   Steriods    Weight:   Height: 69 inches  Weight: 156 lbs.  Usual Body Weight: same lbs.   BMI: 23  Normal  Weight has been stable    Oral Food Intake:  Regular Diet - No Restrictions    Hydration Status:   How many 8 ounce glass of water of fluid do you drink per day?  Specific amount not assessed    Enteral Feeding:   n/a    Nutrition Symptoms:   No Problems with Eating    Activity:   Normal with no limitations     reports that she has never smoked. She has never used smokeless tobacco. She reports that she does not drink alcohol or use drugs.    Evaluation of Nutritional Risk:   Patient has been identified at nutritional risk due to diagnosis, treatment plan, and patient education.  Met with patient and her cousin during her  "initial chemotherapy infusion appointment.  She states her appetite has been normal; reports her weight has been stable; and denies significant nutritional complaints at this time.    Discussed the importance of good nutrition during her treatment course focusing on adequate calorie, protein, nutrient and fluid intake.  Advised her to be consuming smaller more frequent meals/snacks throughout the day to aid with potential nausea management.  Emphasized the importance of protein and its role in the diet; reviewed high protein foods; and recommended she have a protein source at each meal/snack.  Also emphasized the importance of hydration; reviewed good hydrating fluid options; and recommended she drink at least 64 ounces daily.  Reviewed the ACS nutrition booklet and suggested she use it for symptom management as needed.  Provided and reviewed written diet material \"Nutritional Considerations in Breast Cancer\".    Answered their questions and both voiced understanding of information discussed and demonstrated excellent comprehension.  RD's contact information provided and advised her to call with further questions.  Will follow up as indicated.    Electronically signed by:  Mari Villalta RD  9:56 AM  "

## 2018-07-21 DIAGNOSIS — R50.81 NEUTROPENIC FEVER (HCC): Primary | ICD-10-CM

## 2018-07-21 DIAGNOSIS — D70.9 NEUTROPENIC FEVER (HCC): Primary | ICD-10-CM

## 2018-07-21 DIAGNOSIS — Z17.0 MALIGNANT NEOPLASM OF UPPER-OUTER QUADRANT OF LEFT BREAST IN FEMALE, ESTROGEN RECEPTOR POSITIVE (HCC): ICD-10-CM

## 2018-07-21 DIAGNOSIS — C50.412 MALIGNANT NEOPLASM OF UPPER-OUTER QUADRANT OF LEFT BREAST IN FEMALE, ESTROGEN RECEPTOR POSITIVE (HCC): ICD-10-CM

## 2018-07-21 RX ORDER — CIPROFLOXACIN 500 MG/1
500 TABLET, FILM COATED ORAL 2 TIMES DAILY
Qty: 20 TABLET | Refills: 1 | Status: SHIPPED | OUTPATIENT
Start: 2018-07-21 | End: 2018-07-31

## 2018-07-21 RX ORDER — AMOXICILLIN AND CLAVULANATE POTASSIUM 875; 125 MG/1; MG/1
1 TABLET, FILM COATED ORAL EVERY 12 HOURS SCHEDULED
Qty: 20 TABLET | Refills: 1 | Status: SHIPPED | OUTPATIENT
Start: 2018-07-21 | End: 2018-07-31

## 2018-07-23 ENCOUNTER — TELEPHONE (OUTPATIENT)
Dept: ONCOLOGY | Facility: CLINIC | Age: 38
End: 2018-07-23

## 2018-07-23 NOTE — TELEPHONE ENCOUNTER
Attempted to reach patient to check on her after reaching out to the on call doctor this weekend, no answer, no VM option. Will try again later today.

## 2018-07-25 ENCOUNTER — TELEPHONE (OUTPATIENT)
Dept: ONCOLOGY | Facility: CLINIC | Age: 38
End: 2018-07-25

## 2018-07-25 NOTE — TELEPHONE ENCOUNTER
I called and was able to reach the patient. She feels much better now. Pt has been fever free since Sunday. She said the only symptom she had with the fever was tenderness in her chest when she inhaled deeply. I reminded her fevers are treated very cautiously while you are receiving chemotherapy and she did the right thing by calling and letting the doctor know, even though she felt well. Instructed her to complete abx and confirmed her apt for next Wednesday with Dr Malloy.

## 2018-08-01 ENCOUNTER — OFFICE VISIT (OUTPATIENT)
Dept: ONCOLOGY | Facility: CLINIC | Age: 38
End: 2018-08-01

## 2018-08-01 ENCOUNTER — INFUSION (OUTPATIENT)
Dept: ONCOLOGY | Facility: HOSPITAL | Age: 38
End: 2018-08-01

## 2018-08-01 VITALS
HEIGHT: 69 IN | DIASTOLIC BLOOD PRESSURE: 72 MMHG | SYSTOLIC BLOOD PRESSURE: 138 MMHG | BODY MASS INDEX: 23.4 KG/M2 | RESPIRATION RATE: 13 BRPM | TEMPERATURE: 97.2 F | WEIGHT: 158 LBS | HEART RATE: 82 BPM

## 2018-08-01 DIAGNOSIS — C50.412 MALIGNANT NEOPLASM OF UPPER-OUTER QUADRANT OF LEFT BREAST IN FEMALE, ESTROGEN RECEPTOR POSITIVE (HCC): Primary | ICD-10-CM

## 2018-08-01 DIAGNOSIS — Z17.0 MALIGNANT NEOPLASM OF UPPER-OUTER QUADRANT OF LEFT BREAST IN FEMALE, ESTROGEN RECEPTOR POSITIVE (HCC): Primary | ICD-10-CM

## 2018-08-01 PROBLEM — R50.81 NEUTROPENIC FEVER (HCC): Status: RESOLVED | Noted: 2018-07-21 | Resolved: 2018-08-01

## 2018-08-01 PROBLEM — D70.9 NEUTROPENIC FEVER: Status: RESOLVED | Noted: 2018-07-21 | Resolved: 2018-08-01

## 2018-08-01 LAB
ALBUMIN SERPL-MCNC: 4.94 G/DL (ref 3.2–4.8)
ALBUMIN/GLOB SERPL: 1.8 G/DL (ref 1.5–2.5)
ALP SERPL-CCNC: 62 U/L (ref 25–100)
ALT SERPL W P-5'-P-CCNC: 19 U/L (ref 7–40)
ANION GAP SERPL CALCULATED.3IONS-SCNC: 8 MMOL/L (ref 3–11)
AST SERPL-CCNC: 12 U/L (ref 0–33)
BILIRUB SERPL-MCNC: 0.3 MG/DL (ref 0.3–1.2)
BUN BLD-MCNC: 8 MG/DL (ref 9–23)
BUN/CREAT SERPL: 14.8 (ref 7–25)
CALCIUM SPEC-SCNC: 9.9 MG/DL (ref 8.7–10.4)
CHLORIDE SERPL-SCNC: 108 MMOL/L (ref 99–109)
CO2 SERPL-SCNC: 25 MMOL/L (ref 20–31)
CREAT BLD-MCNC: 0.54 MG/DL (ref 0.6–1.3)
ERYTHROCYTE [DISTWIDTH] IN BLOOD BY AUTOMATED COUNT: 13.5 % (ref 11.3–14.5)
GFR SERPL CREATININE-BSD FRML MDRD: 126 ML/MIN/1.73
GLOBULIN UR ELPH-MCNC: 2.8 GM/DL
GLUCOSE BLD-MCNC: 145 MG/DL (ref 70–100)
HCT VFR BLD AUTO: 34.7 % (ref 34.5–44)
HGB BLD-MCNC: 11.7 G/DL (ref 11.5–15.5)
LYMPHOCYTES # BLD AUTO: 1.2 10*3/MM3 (ref 0.6–4.8)
LYMPHOCYTES NFR BLD AUTO: 5.8 % (ref 24–44)
MCH RBC QN AUTO: 30.2 PG (ref 27–31)
MCHC RBC AUTO-ENTMCNC: 33.8 G/DL (ref 32–36)
MCV RBC AUTO: 89.3 FL (ref 80–99)
MONOCYTES # BLD AUTO: 0.5 10*3/MM3 (ref 0–1)
MONOCYTES NFR BLD AUTO: 2.5 % (ref 0–12)
NEUTROPHILS # BLD AUTO: 19.1 10*3/MM3 (ref 1.5–8.3)
NEUTROPHILS NFR BLD AUTO: 91.7 % (ref 41–71)
PLATELET # BLD AUTO: 520 10*3/MM3 (ref 150–450)
PMV BLD AUTO: 7.7 FL (ref 6–12)
POTASSIUM BLD-SCNC: 3.4 MMOL/L (ref 3.5–5.5)
PROT SERPL-MCNC: 7.7 G/DL (ref 5.7–8.2)
RBC # BLD AUTO: 3.89 10*6/MM3 (ref 3.89–5.14)
SODIUM BLD-SCNC: 141 MMOL/L (ref 132–146)
WBC NRBC COR # BLD: 20.8 10*3/MM3 (ref 3.5–10.8)

## 2018-08-01 PROCEDURE — 96366 THER/PROPH/DIAG IV INF ADDON: CPT

## 2018-08-01 PROCEDURE — 85025 COMPLETE CBC W/AUTO DIFF WBC: CPT

## 2018-08-01 PROCEDURE — 96375 TX/PRO/DX INJ NEW DRUG ADDON: CPT

## 2018-08-01 PROCEDURE — 25010000002 CYCLOPHOSPHAMIDE PER 100 MG: Performed by: INTERNAL MEDICINE

## 2018-08-01 PROCEDURE — 80053 COMPREHEN METABOLIC PANEL: CPT

## 2018-08-01 PROCEDURE — 25010000002 HEPARIN FLUSH (PORCINE) 100 UNIT/ML SOLUTION: Performed by: INTERNAL MEDICINE

## 2018-08-01 PROCEDURE — 25010000002 PALONOSETRON PER 25 MCG: Performed by: INTERNAL MEDICINE

## 2018-08-01 PROCEDURE — 99214 OFFICE O/P EST MOD 30 MIN: CPT | Performed by: INTERNAL MEDICINE

## 2018-08-01 PROCEDURE — 96413 CHEMO IV INFUSION 1 HR: CPT

## 2018-08-01 PROCEDURE — 96377 APPLICATON ON-BODY INJECTOR: CPT

## 2018-08-01 PROCEDURE — 25010000002 DOCETAXEL 20 MG/ML SOLUTION 4 ML VIAL: Performed by: INTERNAL MEDICINE

## 2018-08-01 PROCEDURE — 96411 CHEMO IV PUSH ADDL DRUG: CPT

## 2018-08-01 PROCEDURE — 25010000002 PEGFILGRASTIM 6 MG/0.6ML PREFILLED SYRINGE KIT: Performed by: INTERNAL MEDICINE

## 2018-08-01 PROCEDURE — 25010000002 FOSAPREPITANT PER 1 MG: Performed by: INTERNAL MEDICINE

## 2018-08-01 PROCEDURE — 96367 TX/PROPH/DG ADDL SEQ IV INF: CPT

## 2018-08-01 PROCEDURE — 96415 CHEMO IV INFUSION ADDL HR: CPT

## 2018-08-01 PROCEDURE — 96417 CHEMO IV INFUS EACH ADDL SEQ: CPT

## 2018-08-01 PROCEDURE — 25010000002 DOCETAXEL 20 MG/ML SOLUTION 1 ML VIAL: Performed by: INTERNAL MEDICINE

## 2018-08-01 PROCEDURE — 25010000002 DOXORUBICIN PER 10 MG: Performed by: INTERNAL MEDICINE

## 2018-08-01 RX ORDER — SODIUM CHLORIDE 9 MG/ML
250 INJECTION, SOLUTION INTRAVENOUS ONCE
Status: COMPLETED | OUTPATIENT
Start: 2018-08-01 | End: 2018-08-01

## 2018-08-01 RX ORDER — SODIUM CHLORIDE 9 MG/ML
250 INJECTION, SOLUTION INTRAVENOUS ONCE
Status: CANCELLED | OUTPATIENT
Start: 2018-08-01

## 2018-08-01 RX ORDER — SODIUM CHLORIDE 0.9 % (FLUSH) 0.9 %
10 SYRINGE (ML) INJECTION AS NEEDED
Status: DISCONTINUED | OUTPATIENT
Start: 2018-08-01 | End: 2018-08-01 | Stop reason: HOSPADM

## 2018-08-01 RX ORDER — DOXORUBICIN HYDROCHLORIDE 2 MG/ML
50 INJECTION, SOLUTION INTRAVENOUS ONCE
Status: CANCELLED | OUTPATIENT
Start: 2018-08-01

## 2018-08-01 RX ORDER — PALONOSETRON 0.05 MG/ML
0.25 INJECTION, SOLUTION INTRAVENOUS ONCE
Status: CANCELLED | OUTPATIENT
Start: 2018-08-01

## 2018-08-01 RX ORDER — DOXORUBICIN HYDROCHLORIDE 2 MG/ML
50 INJECTION, SOLUTION INTRAVENOUS ONCE
Status: COMPLETED | OUTPATIENT
Start: 2018-08-01 | End: 2018-08-01

## 2018-08-01 RX ORDER — LEVOFLOXACIN 750 MG/1
750 TABLET ORAL DAILY
Qty: 7 TABLET | Refills: 0 | Status: SHIPPED | OUTPATIENT
Start: 2018-08-01 | End: 2018-10-03

## 2018-08-01 RX ORDER — PALONOSETRON 0.05 MG/ML
0.25 INJECTION, SOLUTION INTRAVENOUS ONCE
Status: COMPLETED | OUTPATIENT
Start: 2018-08-01 | End: 2018-08-01

## 2018-08-01 RX ORDER — OLANZAPINE 10 MG/1
10 TABLET ORAL NIGHTLY
Qty: 30 TABLET | Refills: 3 | Status: SHIPPED | OUTPATIENT
Start: 2018-08-01 | End: 2018-11-26

## 2018-08-01 RX ORDER — SODIUM CHLORIDE 0.9 % (FLUSH) 0.9 %
10 SYRINGE (ML) INJECTION AS NEEDED
Status: CANCELLED | OUTPATIENT
Start: 2018-08-01

## 2018-08-01 RX ADMIN — PALONOSETRON HYDROCHLORIDE 0.25 MG: 0.25 INJECTION INTRAVENOUS at 14:11

## 2018-08-01 RX ADMIN — CYCLOPHOSPHAMIDE 940 MG: 1 INJECTION, POWDER, FOR SOLUTION INTRAVENOUS; ORAL at 14:51

## 2018-08-01 RX ADMIN — SODIUM CHLORIDE 500 ML: 9 INJECTION, SOLUTION INTRAVENOUS at 14:11

## 2018-08-01 RX ADMIN — PEGFILGRASTIM 6 MG: KIT SUBCUTANEOUS at 15:27

## 2018-08-01 RX ADMIN — DOCETAXEL 140 MG: 20 INJECTION, SOLUTION, CONCENTRATE INTRAVENOUS at 15:24

## 2018-08-01 RX ADMIN — DOXORUBICIN HYDROCHLORIDE 94 MG: 2 INJECTION, SOLUTION INTRAVENOUS at 14:42

## 2018-08-01 RX ADMIN — HEPARIN 500 UNITS: 100 SYRINGE at 16:24

## 2018-08-01 RX ADMIN — SODIUM CHLORIDE 150 MG: 9 INJECTION, SOLUTION INTRAVENOUS at 14:18

## 2018-08-01 RX ADMIN — Medication 10 ML: at 16:24

## 2018-08-01 NOTE — PROGRESS NOTES
"      PROBLEM LIST:  1. [id number] ER weakly positive, NM negative, Her2 negative invasive ductal carcinoma of the left breast  A) presented with a palpable breast mass.  Biopsy showed ER+, NM- Her2- IDC, grade 3.  B) neoadjuvant TAC started July 2018.    Subjective     HISTORY OF PRESENT ILLNESS:   Ina Ram returns for follow-up.   She received her first dose of Taxotere, Adriamycin, and cyclophosphamide 3 weeks ago.  She says for the first week she felt very tired and nauseated.  She did not vomit.  The second week she felt better, but toward the end of that week she began having fevers and with possible urinary tract symptoms.  She went to her local emergency room she had a CBC which showed a normal white count.  She was put on antibiotics with Cipro and amoxicillin.  Over the next few days she had some sweats and nausea but then her symptoms resolved.  Today she feels well.  She did not have significant diarrhea.  Her hair all fell out about a week ago.    Past Medical History, Past Surgical History, Social History, Family History have been reviewed and are without significant changes except as mentioned.    Review of Systems   A comprehensive 14 point review of systems was performed and was negative except as mentioned.    Medications:  The current medication list was reviewed in the EMR    ALLERGIES:  No Known Allergies    Objective      /72   Pulse 82   Temp 97.2 °F (36.2 °C) (Temporal Artery )   Resp 13   Ht 175.3 cm (69.02\")   Wt 71.7 kg (158 lb)   BMI 23.32 kg/m²      Performance Status: 0    General: well appearing female in no acute distress  Neuro: alert and oriented  HEENT: sclera anicteric, oropharynx clear  Lymphatics: no cervical, supraclavicular, or axillary adenopathy  Cardiovascular: regular rate and rhythm, no murmurs  Breast: In the left breast at about 3:00 there is a firm mass that is movable and measures about 3 cm in diameter   Lungs: clear to auscultation " bilaterally  Abdomen: soft, nontender, nondistended.  No palpable organomegaly  Extremeties: no lower extremity edema  Skin: no rashes, lesions, bruising, or petechiae  Psych: mood and affect appropriate      RECENT LABS:  Blood work was reviewed and is notable for white count 20.8, hemoglobin 11.7, platelets 520.  CMP is notable for creatinine 0.54.          Assessment/Plan   Ina Ram is a 38 y.o. year old female with a stage II B ER weakly positive HER-2 negative breast cancer who returns for follow up on neoadjuvant chemotherapy.  She had expected side effects of fatigue and nausea with her treatment.  She also had a recent febrile episode without neutropenia.  She lives at least an hour away from the nearest pharmacy that is open 24 hours or on weekends.  Because of this I am going to go ahead and send a prescription for levofloxacin for her to have in the case of an emergency, however we discussed that if she again develops fever during her treatment we may again need to consider emergency room evaluation and labs.    She had nausea that was not adequately controlled with Zofran and Phenergan.  I sent a prescription for Zyprexa which she should take for the first 7-10 days.    I have not previously seen her second on compared her breast exam, however compared to the prior description it seems that her breast mass is now easily movable and I expect that she is responding to treatment.  If we have any concerns about her response after 2 or 3 cycles, we'll consider repeat imaging.    Follow-up in 3 weeks.                  Visit time was 25 minutes, greater than 50% spent in counseling      Fifi Malloy MD  [facility] Hematology and Oncology    8/1/2018          CC:

## 2018-08-02 ENCOUNTER — DOCUMENTATION (OUTPATIENT)
Dept: OTHER | Facility: HOSPITAL | Age: 38
End: 2018-08-02

## 2018-08-02 DIAGNOSIS — Z17.0 MALIGNANT NEOPLASM OF UPPER-OUTER QUADRANT OF LEFT BREAST IN FEMALE, ESTROGEN RECEPTOR POSITIVE (HCC): Primary | ICD-10-CM

## 2018-08-02 DIAGNOSIS — L65.9 ALOPECIA: ICD-10-CM

## 2018-08-02 DIAGNOSIS — C50.412 MALIGNANT NEOPLASM OF UPPER-OUTER QUADRANT OF LEFT BREAST IN FEMALE, ESTROGEN RECEPTOR POSITIVE (HCC): Primary | ICD-10-CM

## 2018-08-02 NOTE — PROGRESS NOTES
Patient called asking for script to cranial prosthesis and that she cannot find the [employer] wig catalogue I gave her - I will put both of these in the mail to her today - she also asked about using tree tea oil on her nails and toe nails but that the smell makes her so nauseated - I explained that her hydration and nutritional status was most important to to forgo using the oil and I would see if there were any other recommendations.

## 2018-08-15 ENCOUNTER — TELEPHONE (OUTPATIENT)
Dept: ONCOLOGY | Facility: CLINIC | Age: 38
End: 2018-08-15

## 2018-08-15 NOTE — TELEPHONE ENCOUNTER
"Called to check on patient. She is still \"having discomfort, not pain\" in her chest wall. She is still running a low grade fever at night time around 100.5. She is still taking her antibiotic and rotating tylenol and motrin.   "

## 2018-08-22 ENCOUNTER — INFUSION (OUTPATIENT)
Dept: ONCOLOGY | Facility: HOSPITAL | Age: 38
End: 2018-08-22

## 2018-08-22 ENCOUNTER — OFFICE VISIT (OUTPATIENT)
Dept: ONCOLOGY | Facility: CLINIC | Age: 38
End: 2018-08-22

## 2018-08-22 VITALS
HEART RATE: 78 BPM | SYSTOLIC BLOOD PRESSURE: 119 MMHG | WEIGHT: 162 LBS | DIASTOLIC BLOOD PRESSURE: 64 MMHG | HEIGHT: 69 IN | RESPIRATION RATE: 16 BRPM | OXYGEN SATURATION: 99 % | TEMPERATURE: 97.7 F | BODY MASS INDEX: 23.99 KG/M2

## 2018-08-22 DIAGNOSIS — Z17.0 MALIGNANT NEOPLASM OF UPPER-OUTER QUADRANT OF LEFT BREAST IN FEMALE, ESTROGEN RECEPTOR POSITIVE (HCC): Primary | ICD-10-CM

## 2018-08-22 DIAGNOSIS — C50.412 MALIGNANT NEOPLASM OF UPPER-OUTER QUADRANT OF LEFT BREAST IN FEMALE, ESTROGEN RECEPTOR POSITIVE (HCC): Primary | ICD-10-CM

## 2018-08-22 LAB
ALBUMIN SERPL-MCNC: 4.67 G/DL (ref 3.2–4.8)
ALBUMIN/GLOB SERPL: 2 G/DL (ref 1.5–2.5)
ALP SERPL-CCNC: 67 U/L (ref 25–100)
ALT SERPL W P-5'-P-CCNC: 30 U/L (ref 7–40)
ANION GAP SERPL CALCULATED.3IONS-SCNC: 9 MMOL/L (ref 3–11)
AST SERPL-CCNC: 20 U/L (ref 0–33)
BILIRUB SERPL-MCNC: 0.3 MG/DL (ref 0.3–1.2)
BUN BLD-MCNC: 9 MG/DL (ref 9–23)
BUN/CREAT SERPL: 18 (ref 7–25)
CALCIUM SPEC-SCNC: 9.7 MG/DL (ref 8.7–10.4)
CHLORIDE SERPL-SCNC: 106 MMOL/L (ref 99–109)
CO2 SERPL-SCNC: 23 MMOL/L (ref 20–31)
CREAT BLD-MCNC: 0.5 MG/DL (ref 0.6–1.3)
ERYTHROCYTE [DISTWIDTH] IN BLOOD BY AUTOMATED COUNT: 15.6 % (ref 11.3–14.5)
GFR SERPL CREATININE-BSD FRML MDRD: 138 ML/MIN/1.73
GLOBULIN UR ELPH-MCNC: 2.3 GM/DL
GLUCOSE BLD-MCNC: 133 MG/DL (ref 70–100)
HCT VFR BLD AUTO: 32.5 % (ref 34.5–44)
HGB BLD-MCNC: 10.5 G/DL (ref 11.5–15.5)
LYMPHOCYTES # BLD AUTO: 1.2 10*3/MM3 (ref 0.6–4.8)
LYMPHOCYTES NFR BLD AUTO: 7.2 % (ref 24–44)
MCH RBC QN AUTO: 29 PG (ref 27–31)
MCHC RBC AUTO-ENTMCNC: 32.2 G/DL (ref 32–36)
MCV RBC AUTO: 90 FL (ref 80–99)
MONOCYTES # BLD AUTO: 0.4 10*3/MM3 (ref 0–1)
MONOCYTES NFR BLD AUTO: 2.7 % (ref 0–12)
NEUTROPHILS # BLD AUTO: 14.8 10*3/MM3 (ref 1.5–8.3)
NEUTROPHILS NFR BLD AUTO: 90.1 % (ref 41–71)
PLATELET # BLD AUTO: 433 10*3/MM3 (ref 150–450)
PMV BLD AUTO: 7 FL (ref 6–12)
POTASSIUM BLD-SCNC: 4 MMOL/L (ref 3.5–5.5)
PROT SERPL-MCNC: 7 G/DL (ref 5.7–8.2)
RBC # BLD AUTO: 3.61 10*6/MM3 (ref 3.89–5.14)
SODIUM BLD-SCNC: 138 MMOL/L (ref 132–146)
WBC NRBC COR # BLD: 16.4 10*3/MM3 (ref 3.5–10.8)

## 2018-08-22 PROCEDURE — 96365 THER/PROPH/DIAG IV INF INIT: CPT

## 2018-08-22 PROCEDURE — 25010000002 PALONOSETRON PER 25 MCG: Performed by: INTERNAL MEDICINE

## 2018-08-22 PROCEDURE — 36591 DRAW BLOOD OFF VENOUS DEVICE: CPT

## 2018-08-22 PROCEDURE — 25010000002 DOXORUBICIN PER 10 MG: Performed by: INTERNAL MEDICINE

## 2018-08-22 PROCEDURE — 25010000002 HEPARIN FLUSH (PORCINE) 100 UNIT/ML SOLUTION: Performed by: INTERNAL MEDICINE

## 2018-08-22 PROCEDURE — 25010000002 CYCLOPHOSPHAMIDE PER 100 MG: Performed by: INTERNAL MEDICINE

## 2018-08-22 PROCEDURE — 25010000002 PEGFILGRASTIM 6 MG/0.6ML PREFILLED SYRINGE KIT: Performed by: INTERNAL MEDICINE

## 2018-08-22 PROCEDURE — 96411 CHEMO IV PUSH ADDL DRUG: CPT

## 2018-08-22 PROCEDURE — 96375 TX/PRO/DX INJ NEW DRUG ADDON: CPT

## 2018-08-22 PROCEDURE — 80053 COMPREHEN METABOLIC PANEL: CPT

## 2018-08-22 PROCEDURE — 96409 CHEMO IV PUSH SNGL DRUG: CPT

## 2018-08-22 PROCEDURE — 25010000002 FOSAPREPITANT PER 1 MG: Performed by: INTERNAL MEDICINE

## 2018-08-22 PROCEDURE — 96367 TX/PROPH/DG ADDL SEQ IV INF: CPT

## 2018-08-22 PROCEDURE — 99214 OFFICE O/P EST MOD 30 MIN: CPT | Performed by: INTERNAL MEDICINE

## 2018-08-22 PROCEDURE — 25010000002 DOCETAXEL 20 MG/ML SOLUTION 4 ML VIAL: Performed by: INTERNAL MEDICINE

## 2018-08-22 PROCEDURE — 96417 CHEMO IV INFUS EACH ADDL SEQ: CPT

## 2018-08-22 PROCEDURE — 96413 CHEMO IV INFUSION 1 HR: CPT

## 2018-08-22 PROCEDURE — 96377 APPLICATON ON-BODY INJECTOR: CPT

## 2018-08-22 PROCEDURE — 96374 THER/PROPH/DIAG INJ IV PUSH: CPT

## 2018-08-22 PROCEDURE — 25010000002 DOCETAXEL 20 MG/ML SOLUTION 1 ML VIAL: Performed by: INTERNAL MEDICINE

## 2018-08-22 PROCEDURE — 85025 COMPLETE CBC W/AUTO DIFF WBC: CPT

## 2018-08-22 RX ORDER — DEXAMETHASONE 4 MG/1
4 TABLET ORAL 2 TIMES DAILY WITH MEALS
Qty: 60 TABLET | Refills: 0 | Status: SHIPPED | OUTPATIENT
Start: 2018-08-22 | End: 2018-11-26

## 2018-08-22 RX ORDER — SODIUM CHLORIDE 0.9 % (FLUSH) 0.9 %
10 SYRINGE (ML) INJECTION AS NEEDED
Status: DISCONTINUED | OUTPATIENT
Start: 2018-08-22 | End: 2018-08-22 | Stop reason: HOSPADM

## 2018-08-22 RX ORDER — PALONOSETRON 0.05 MG/ML
0.25 INJECTION, SOLUTION INTRAVENOUS ONCE
Status: COMPLETED | OUTPATIENT
Start: 2018-08-22 | End: 2018-08-22

## 2018-08-22 RX ORDER — DOXORUBICIN HYDROCHLORIDE 2 MG/ML
50 INJECTION, SOLUTION INTRAVENOUS ONCE
Status: CANCELLED | OUTPATIENT
Start: 2018-08-22

## 2018-08-22 RX ORDER — DOXORUBICIN HYDROCHLORIDE 2 MG/ML
50 INJECTION, SOLUTION INTRAVENOUS ONCE
Status: COMPLETED | OUTPATIENT
Start: 2018-08-22 | End: 2018-08-22

## 2018-08-22 RX ORDER — SODIUM CHLORIDE 9 MG/ML
250 INJECTION, SOLUTION INTRAVENOUS ONCE
Status: COMPLETED | OUTPATIENT
Start: 2018-08-22 | End: 2018-08-22

## 2018-08-22 RX ORDER — SODIUM CHLORIDE 0.9 % (FLUSH) 0.9 %
10 SYRINGE (ML) INJECTION AS NEEDED
Status: CANCELLED | OUTPATIENT
Start: 2018-08-22

## 2018-08-22 RX ORDER — PALONOSETRON 0.05 MG/ML
0.25 INJECTION, SOLUTION INTRAVENOUS ONCE
Status: CANCELLED | OUTPATIENT
Start: 2018-08-22

## 2018-08-22 RX ORDER — SODIUM CHLORIDE 9 MG/ML
250 INJECTION, SOLUTION INTRAVENOUS ONCE
Status: CANCELLED | OUTPATIENT
Start: 2018-08-22

## 2018-08-22 RX ADMIN — SODIUM CHLORIDE 250 ML: 9 INJECTION, SOLUTION INTRAVENOUS at 12:31

## 2018-08-22 RX ADMIN — SODIUM CHLORIDE 150 MG: 9 INJECTION, SOLUTION INTRAVENOUS at 12:47

## 2018-08-22 RX ADMIN — DOCETAXEL 140 MG: 20 INJECTION, SOLUTION, CONCENTRATE INTRAVENOUS at 14:21

## 2018-08-22 RX ADMIN — CYCLOPHOSPHAMIDE 940 MG: 1 INJECTION, POWDER, FOR SOLUTION INTRAVENOUS; ORAL at 13:45

## 2018-08-22 RX ADMIN — PEGFILGRASTIM 6 MG: KIT SUBCUTANEOUS at 15:31

## 2018-08-22 RX ADMIN — HEPARIN 500 UNITS: 100 SYRINGE at 15:30

## 2018-08-22 RX ADMIN — PALONOSETRON HYDROCHLORIDE 0.25 MG: 0.25 INJECTION INTRAVENOUS at 12:31

## 2018-08-22 RX ADMIN — Medication 10 ML: at 15:29

## 2018-08-22 RX ADMIN — DOXORUBICIN HYDROCHLORIDE 94 MG: 2 INJECTION, SOLUTION INTRAVENOUS at 13:37

## 2018-08-22 NOTE — PROGRESS NOTES
"      PROBLEM LIST:  1. [id number] ER weakly positive, SC negative, Her2 negative invasive ductal carcinoma of the left breast  A) presented with a palpable breast mass.  Biopsy showed ER+, SC- Her2- IDC, grade 3.  B) neoadjuvant TAC started July 2018.    Subjective     HISTORY OF PRESENT ILLNESS:   Ina Ram returns for follow-up after 2 cycles of TAC.  She again had a low grade fever and pleuritic chest pain with this cycle of treatment.  She took levaquin.  The symptoms lasted about 4 days and then resolved.  Her temperature is mostly around 100.  Otherwise she says her nausea was better with the Zyprexa.  She feels tired.  She does not mention any neuropathy symptoms so far.    Past Medical History, Past Surgical History, Social History, Family History have been reviewed and are without significant changes except as mentioned.    Review of Systems   A comprehensive 14 point review of systems was performed and was negative except as mentioned.    Medications:  The current medication list was reviewed in the EMR    ALLERGIES:  No Known Allergies    Objective      /64 Comment: RUE  Pulse 78   Temp 97.7 °F (36.5 °C) (Temporal Artery )   Resp 16   Ht 175.3 cm (69\")   Wt 73.5 kg (162 lb)   SpO2 99% Comment: RA  BMI 23.92 kg/m²      Performance Status: 0    General: well appearing female in no acute distress  Neuro: alert and oriented  HEENT: sclera anicteric, oropharynx clear  Lymphatics: no cervical, supraclavicular, or axillary adenopathy  Cardiovascular: regular rate and rhythm, no murmurs  Breast: In the left breast at about 3:00 there is a firm mass that is movable and measures about 2 cm in diameter.  It is flatter compared to her previous visit.  Lungs: clear to auscultation bilaterally  Abdomen: soft, nontender, nondistended.  No palpable organomegaly  Extremeties: no lower extremity edema  Skin: no rashes, lesions, bruising, or petechiae  Psych: mood and affect " appropriate          Assessment/Plan   Ina Ram is a 38 y.o. year old female with a stage II B ER weakly positive HER-2 negative breast cancer who returns for follow up on neoadjuvant chemotherapy.  She is having a good clinical response to treatment.  We will plan to complete 6 cycles of chemotherapy.  We spent some time discussing her surgery options today.  I told her that there is not a clear long-term difference in outcome between mastectomy versus lumpectomy and radiation.  She once to know more about reconstruction options.  I will go ahead and make a referral for her to see Dr. Penny Lara which hopefully we can coordinate with one of her other visits here.    She is having lab believe is a inflammatory reaction to Taxotere with pleuritic symptoms that are happening about 10 days after each cycle.  These are associated with low-grade fever.  Would have her get her labs checked next Friday to make sure she is not neutropenic, and if she again has these same symptoms we'll try treating it with dexamethasone 4 mg twice a day for about 4 days.    Follow-up in 3 weeks.                  Visit time was 25 minutes, greater than 50% spent in counseling      Fifi Malloy MD  [facility] Hematology and Oncology    8/22/2018          CC:

## 2018-08-23 DIAGNOSIS — Z17.0 MALIGNANT NEOPLASM OF UPPER-OUTER QUADRANT OF LEFT BREAST IN FEMALE, ESTROGEN RECEPTOR POSITIVE (HCC): Primary | ICD-10-CM

## 2018-08-23 DIAGNOSIS — C50.412 MALIGNANT NEOPLASM OF UPPER-OUTER QUADRANT OF LEFT BREAST IN FEMALE, ESTROGEN RECEPTOR POSITIVE (HCC): Primary | ICD-10-CM

## 2018-08-23 NOTE — PROGRESS NOTES
Spoke with  at Dr. Love's office. She is aware Dr Malloy wants a cbc checked on this patient next Friday 8/31, and a collection of Thursday is ok if results would not be available before the weekend.  said they should be back Friday and knows to fax results to [employer] fax number if it is a Friday, and the Clerk line if Thursday.     Cbc order placed in epic, printed, and faxed to their office.

## 2018-09-12 ENCOUNTER — OFFICE VISIT (OUTPATIENT)
Dept: ONCOLOGY | Facility: CLINIC | Age: 38
End: 2018-09-12

## 2018-09-12 ENCOUNTER — INFUSION (OUTPATIENT)
Dept: ONCOLOGY | Facility: HOSPITAL | Age: 38
End: 2018-09-12

## 2018-09-12 ENCOUNTER — LAB (OUTPATIENT)
Dept: LAB | Facility: HOSPITAL | Age: 38
End: 2018-09-12

## 2018-09-12 VITALS
DIASTOLIC BLOOD PRESSURE: 75 MMHG | RESPIRATION RATE: 16 BRPM | TEMPERATURE: 97 F | OXYGEN SATURATION: 98 % | SYSTOLIC BLOOD PRESSURE: 137 MMHG | BODY MASS INDEX: 24.44 KG/M2 | HEIGHT: 69 IN | WEIGHT: 165 LBS | HEART RATE: 90 BPM

## 2018-09-12 DIAGNOSIS — C50.412 MALIGNANT NEOPLASM OF UPPER-OUTER QUADRANT OF LEFT BREAST IN FEMALE, ESTROGEN RECEPTOR POSITIVE (HCC): ICD-10-CM

## 2018-09-12 DIAGNOSIS — C50.412 MALIGNANT NEOPLASM OF UPPER-OUTER QUADRANT OF LEFT BREAST IN FEMALE, ESTROGEN RECEPTOR POSITIVE (HCC): Primary | ICD-10-CM

## 2018-09-12 DIAGNOSIS — Z17.0 MALIGNANT NEOPLASM OF UPPER-OUTER QUADRANT OF LEFT BREAST IN FEMALE, ESTROGEN RECEPTOR POSITIVE (HCC): Primary | ICD-10-CM

## 2018-09-12 DIAGNOSIS — Z17.0 MALIGNANT NEOPLASM OF UPPER-OUTER QUADRANT OF LEFT BREAST IN FEMALE, ESTROGEN RECEPTOR POSITIVE (HCC): ICD-10-CM

## 2018-09-12 LAB
ALBUMIN SERPL-MCNC: 4.87 G/DL (ref 3.2–4.8)
ALBUMIN/GLOB SERPL: 2.7 G/DL (ref 1.5–2.5)
ALP SERPL-CCNC: 69 U/L (ref 25–100)
ALT SERPL W P-5'-P-CCNC: 27 U/L (ref 7–40)
ANION GAP SERPL CALCULATED.3IONS-SCNC: 8 MMOL/L (ref 3–11)
AST SERPL-CCNC: 14 U/L (ref 0–33)
BILIRUB SERPL-MCNC: 0.3 MG/DL (ref 0.3–1.2)
BUN BLD-MCNC: 14 MG/DL (ref 9–23)
BUN/CREAT SERPL: 24.1 (ref 7–25)
CALCIUM SPEC-SCNC: 9.6 MG/DL (ref 8.7–10.4)
CHLORIDE SERPL-SCNC: 105 MMOL/L (ref 99–109)
CO2 SERPL-SCNC: 24 MMOL/L (ref 20–31)
CREAT BLD-MCNC: 0.58 MG/DL (ref 0.6–1.3)
ERYTHROCYTE [DISTWIDTH] IN BLOOD BY AUTOMATED COUNT: 17.4 % (ref 11.3–14.5)
GFR SERPL CREATININE-BSD FRML MDRD: 116 ML/MIN/1.73
GLOBULIN UR ELPH-MCNC: 1.8 GM/DL
GLUCOSE BLD-MCNC: 165 MG/DL (ref 70–100)
HCT VFR BLD AUTO: 33.4 % (ref 34.5–44)
HGB BLD-MCNC: 10.9 G/DL (ref 11.5–15.5)
LYMPHOCYTES # BLD AUTO: 0.7 10*3/MM3 (ref 0.6–4.8)
LYMPHOCYTES NFR BLD AUTO: 6.6 % (ref 24–44)
MCH RBC QN AUTO: 30.4 PG (ref 27–31)
MCHC RBC AUTO-ENTMCNC: 32.8 G/DL (ref 32–36)
MCV RBC AUTO: 92.7 FL (ref 80–99)
MONOCYTES # BLD AUTO: 0.1 10*3/MM3 (ref 0–1)
MONOCYTES NFR BLD AUTO: 1.3 % (ref 0–12)
NEUTROPHILS # BLD AUTO: 10 10*3/MM3 (ref 1.5–8.3)
NEUTROPHILS NFR BLD AUTO: 92.1 % (ref 41–71)
PLATELET # BLD AUTO: 449 10*3/MM3 (ref 150–450)
PMV BLD AUTO: 7.4 FL (ref 6–12)
POTASSIUM BLD-SCNC: 4.7 MMOL/L (ref 3.5–5.5)
PROT SERPL-MCNC: 6.7 G/DL (ref 5.7–8.2)
RBC # BLD AUTO: 3.6 10*6/MM3 (ref 3.89–5.14)
SODIUM BLD-SCNC: 137 MMOL/L (ref 132–146)
WBC NRBC COR # BLD: 10.9 10*3/MM3 (ref 3.5–10.8)

## 2018-09-12 PROCEDURE — 96377 APPLICATON ON-BODY INJECTOR: CPT

## 2018-09-12 PROCEDURE — 99214 OFFICE O/P EST MOD 30 MIN: CPT | Performed by: INTERNAL MEDICINE

## 2018-09-12 PROCEDURE — 96375 TX/PRO/DX INJ NEW DRUG ADDON: CPT

## 2018-09-12 PROCEDURE — 96367 TX/PROPH/DG ADDL SEQ IV INF: CPT

## 2018-09-12 PROCEDURE — 96411 CHEMO IV PUSH ADDL DRUG: CPT

## 2018-09-12 PROCEDURE — 96366 THER/PROPH/DIAG IV INF ADDON: CPT

## 2018-09-12 PROCEDURE — 96417 CHEMO IV INFUS EACH ADDL SEQ: CPT

## 2018-09-12 PROCEDURE — 25010000002 FOSAPREPITANT PER 1 MG: Performed by: INTERNAL MEDICINE

## 2018-09-12 PROCEDURE — 85025 COMPLETE CBC W/AUTO DIFF WBC: CPT

## 2018-09-12 PROCEDURE — 25010000002 DOCETAXEL 20 MG/ML SOLUTION 4 ML VIAL: Performed by: INTERNAL MEDICINE

## 2018-09-12 PROCEDURE — 25010000002 CYCLOPHOSPHAMIDE PER 100 MG: Performed by: INTERNAL MEDICINE

## 2018-09-12 PROCEDURE — 36415 COLL VENOUS BLD VENIPUNCTURE: CPT

## 2018-09-12 PROCEDURE — 25010000002 PALONOSETRON PER 25 MCG: Performed by: INTERNAL MEDICINE

## 2018-09-12 PROCEDURE — 80053 COMPREHEN METABOLIC PANEL: CPT

## 2018-09-12 PROCEDURE — 25010000002 DOXORUBICIN PER 10 MG: Performed by: INTERNAL MEDICINE

## 2018-09-12 PROCEDURE — 25010000002 HEPARIN FLUSH (PORCINE) 100 UNIT/ML SOLUTION: Performed by: INTERNAL MEDICINE

## 2018-09-12 PROCEDURE — 25010000002 DOCETAXEL 20 MG/ML SOLUTION 1 ML VIAL: Performed by: INTERNAL MEDICINE

## 2018-09-12 PROCEDURE — 96413 CHEMO IV INFUSION 1 HR: CPT

## 2018-09-12 PROCEDURE — 25010000002 PEGFILGRASTIM 6 MG/0.6ML PREFILLED SYRINGE KIT: Performed by: INTERNAL MEDICINE

## 2018-09-12 RX ORDER — PALONOSETRON 0.05 MG/ML
0.25 INJECTION, SOLUTION INTRAVENOUS ONCE
Status: CANCELLED | OUTPATIENT
Start: 2018-09-12

## 2018-09-12 RX ORDER — DOXORUBICIN HYDROCHLORIDE 2 MG/ML
50 INJECTION, SOLUTION INTRAVENOUS ONCE
Status: COMPLETED | OUTPATIENT
Start: 2018-09-12 | End: 2018-09-12

## 2018-09-12 RX ORDER — SODIUM CHLORIDE 9 MG/ML
250 INJECTION, SOLUTION INTRAVENOUS ONCE
Status: CANCELLED | OUTPATIENT
Start: 2018-09-12

## 2018-09-12 RX ORDER — PALONOSETRON 0.05 MG/ML
0.25 INJECTION, SOLUTION INTRAVENOUS ONCE
Status: COMPLETED | OUTPATIENT
Start: 2018-09-12 | End: 2018-09-12

## 2018-09-12 RX ORDER — SODIUM CHLORIDE 9 MG/ML
250 INJECTION, SOLUTION INTRAVENOUS ONCE
Status: COMPLETED | OUTPATIENT
Start: 2018-09-12 | End: 2018-09-12

## 2018-09-12 RX ORDER — SODIUM CHLORIDE 0.9 % (FLUSH) 0.9 %
10 SYRINGE (ML) INJECTION AS NEEDED
Status: CANCELLED | OUTPATIENT
Start: 2018-09-12

## 2018-09-12 RX ORDER — DOXORUBICIN HYDROCHLORIDE 2 MG/ML
50 INJECTION, SOLUTION INTRAVENOUS ONCE
Status: CANCELLED | OUTPATIENT
Start: 2018-09-12

## 2018-09-12 RX ADMIN — SODIUM CHLORIDE 150 MG: 9 INJECTION, SOLUTION INTRAVENOUS at 13:05

## 2018-09-12 RX ADMIN — PEGFILGRASTIM 6 MG: KIT SUBCUTANEOUS at 15:19

## 2018-09-12 RX ADMIN — HEPARIN 500 UNITS: 100 SYRINGE at 15:35

## 2018-09-12 RX ADMIN — PALONOSETRON HYDROCHLORIDE 0.25 MG: 0.25 INJECTION INTRAVENOUS at 12:51

## 2018-09-12 RX ADMIN — SODIUM CHLORIDE 250 ML: 9 INJECTION, SOLUTION INTRAVENOUS at 12:51

## 2018-09-12 RX ADMIN — DOCETAXEL 140 MG: 20 INJECTION, SOLUTION, CONCENTRATE INTRAVENOUS at 14:34

## 2018-09-12 RX ADMIN — CYCLOPHOSPHAMIDE 940 MG: 1 INJECTION, POWDER, FOR SOLUTION INTRAVENOUS; ORAL at 13:59

## 2018-09-12 RX ADMIN — DOXORUBICIN HYDROCHLORIDE 94 MG: 2 INJECTION, SOLUTION INTRAVENOUS at 13:51

## 2018-09-12 NOTE — PROGRESS NOTES
"      PROBLEM LIST:  1. [id number] ER weakly positive, ND negative, Her2 negative invasive ductal carcinoma of the left breast  A) presented with a palpable breast mass.  Biopsy showed ER+, ND- Her2- IDC, grade 3.  B) neoadjuvant TAC started July 2018.    Subjective     HISTORY OF PRESENT ILLNESS:   Ina Ram returns for follow-up after 3 cycles of TAC.  She did not have recurrence of the fever and pleuritic chest pain with this most recent cycle.  She is not having nausea.  She does have fatigue.  The tumor in the breast continues to improve.    Past Medical History, Past Surgical History, Social History, Family History have been reviewed and are without significant changes except as mentioned.    Review of Systems   A comprehensive 14 point review of systems was performed and was negative except as mentioned.    Medications:  The current medication list was reviewed in the EMR    ALLERGIES:  No Known Allergies    Objective      /75 Comment: RUE  Pulse 90   Temp 97 °F (36.1 °C) (Temporal Artery )   Resp 16   Ht 175.3 cm (69\")   Wt 74.8 kg (165 lb)   SpO2 98% Comment: RA  BMI 24.37 kg/m²      Performance Status: 0    General: well appearing female in no acute distress  Neuro: alert and oriented  HEENT: sclera anicteric, oropharynx clear  Lymphatics: no cervical, supraclavicular, or axillary adenopathy  Cardiovascular: regular rate and rhythm, no murmurs  Breast: In the left breast at about 3:00 there is a firm mass that is movable and measures about 1 cm in diameter.   Lungs: clear to auscultation bilaterally  Abdomen: soft, nontender, nondistended.  No palpable organomegaly  Extremeties: no lower extremity edema  Skin: no rashes, lesions, bruising, or petechiae  Psych: mood and affect appropriate          Assessment/Plan   Ina Ram is a 38 y.o. year old female with a stage II B ER weakly positive HER-2 negative breast cancer who returns for follow up on neoadjuvant chemotherapy.  She " continues to have response with decreasing size of the breast mass.  Overall she is tolerating treatment reasonably well.  I again asked her to call in the event of a fever.    She may be a candidate for clinical trials after completion of chemotherapy and surgery.  We will screen for this postop.      Follow-up in 3 weeks.                  Visit time was 25 minutes, greater than 50% spent in counseling      Fifi Malloy MD  [facility] Hematology and Oncology    9/12/2018          CC:

## 2018-09-18 ENCOUNTER — TELEPHONE (OUTPATIENT)
Dept: ONCOLOGY | Facility: CLINIC | Age: 38
End: 2018-09-18

## 2018-09-18 ENCOUNTER — DOCUMENTATION (OUTPATIENT)
Dept: OTHER | Facility: HOSPITAL | Age: 38
End: 2018-09-18

## 2018-09-18 DIAGNOSIS — C50.412 MALIGNANT NEOPLASM OF UPPER-OUTER QUADRANT OF LEFT BREAST IN FEMALE, ESTROGEN RECEPTOR POSITIVE (HCC): Primary | ICD-10-CM

## 2018-09-18 DIAGNOSIS — Z17.0 MALIGNANT NEOPLASM OF UPPER-OUTER QUADRANT OF LEFT BREAST IN FEMALE, ESTROGEN RECEPTOR POSITIVE (HCC): Primary | ICD-10-CM

## 2018-09-18 NOTE — PROGRESS NOTES
Patient's sister, Annemarie called and was concerned that the MED ONC office thought that the patient would be coming to [facility] vs. [facility]. I spoke to Angie Schneider and they clarified plan of care with patients PCP. PCP will direct admit patient to [facility] and discussed plan of care with Angie. I called and relayed this to patients sister.

## 2018-09-18 NOTE — TELEPHONE ENCOUNTER
----- Message from Lila Pal sent at 9/18/2018  8:39 AM EDT -----  Regarding: SUHAS-FEVER  Contact: [phone]  Patient had treatment on 9/12/18 and she is running a fever 101.0 please call.

## 2018-09-18 NOTE — TELEPHONE ENCOUNTER
Received labs from [facility]. WBC 0.5. Manual differential requested. Records scanned into epic. Discussed with SHIVA Singh. Advised pt to go to the ER for IV antibiotics.

## 2018-09-18 NOTE — TELEPHONE ENCOUNTER
Received call from patient's pcp, Dr. Love. She is going to assist the patient in bypassing the ER and direct admit her. She will order blood and urine cultures, a CXR, and broad spectrum IV antibiotics. She will call us with abnormal results.

## 2018-09-20 ENCOUNTER — TELEPHONE (OUTPATIENT)
Dept: ONCOLOGY | Facility: CLINIC | Age: 38
End: 2018-09-20

## 2018-09-20 NOTE — TELEPHONE ENCOUNTER
----- Message from Lila Pal sent at 9/20/2018 12:07 PM EDT -----  Regarding: SUHAS-ANTIBIOTIC  Contact: [phone]  Patient just got discharged today and they started her on Levaquin 500 mg should she start these today and is this okay to take?

## 2018-09-20 NOTE — TELEPHONE ENCOUNTER
Spoke with pt and told her that there should be no reason why she cannot take an antibiotic unless she has an allergic reaction to it.    Pt mentioned that she wishes to speak with Dr. Malloy about the pros and cons regarding her treatment plan.  She states that she is struggling with fatigue.  I notified her that Dr. Malloy is out this week but will be back again Tuesday, September 25th.    Pt verbalized understanding above.    Ina informed me that she was recently in the hospital @ [facility] [place] and f/u with her PCP.  I have requested Lindsey

## 2018-09-25 NOTE — PROGRESS NOTES
Called patient.  She was hospitalized for 3 days with neutropenic fever, she is now home and finishing a course of abx.  She is feeling better.  When she was feeling her worse she was questioning continuing chemotherapy.  I told her I would recommend trying to complete these last 2 cycles to give her the best chance of a good long term outcome.  I will plan to reduce her chemo doses by 10% for the remaining treatments.  She is willing to continue.

## 2018-10-03 ENCOUNTER — INFUSION (OUTPATIENT)
Dept: ONCOLOGY | Facility: HOSPITAL | Age: 38
End: 2018-10-03

## 2018-10-03 ENCOUNTER — OFFICE VISIT (OUTPATIENT)
Dept: ONCOLOGY | Facility: CLINIC | Age: 38
End: 2018-10-03

## 2018-10-03 VITALS
HEIGHT: 69 IN | WEIGHT: 163.5 LBS | TEMPERATURE: 98.2 F | OXYGEN SATURATION: 99 % | BODY MASS INDEX: 24.22 KG/M2 | SYSTOLIC BLOOD PRESSURE: 129 MMHG | HEART RATE: 88 BPM | DIASTOLIC BLOOD PRESSURE: 70 MMHG | RESPIRATION RATE: 14 BRPM

## 2018-10-03 DIAGNOSIS — Z17.0 MALIGNANT NEOPLASM OF UPPER-OUTER QUADRANT OF LEFT BREAST IN FEMALE, ESTROGEN RECEPTOR POSITIVE (HCC): Primary | ICD-10-CM

## 2018-10-03 DIAGNOSIS — C50.412 MALIGNANT NEOPLASM OF UPPER-OUTER QUADRANT OF LEFT BREAST IN FEMALE, ESTROGEN RECEPTOR POSITIVE (HCC): Primary | ICD-10-CM

## 2018-10-03 LAB
ALBUMIN SERPL-MCNC: 4.53 G/DL (ref 3.2–4.8)
ALBUMIN/GLOB SERPL: 2.7 G/DL (ref 1.5–2.5)
ALP SERPL-CCNC: 64 U/L (ref 25–100)
ALT SERPL W P-5'-P-CCNC: 49 U/L (ref 7–40)
ANION GAP SERPL CALCULATED.3IONS-SCNC: 8 MMOL/L (ref 3–11)
AST SERPL-CCNC: 28 U/L (ref 0–33)
BILIRUB SERPL-MCNC: 0.3 MG/DL (ref 0.3–1.2)
BUN BLD-MCNC: 10 MG/DL (ref 9–23)
BUN/CREAT SERPL: 16.4 (ref 7–25)
CALCIUM SPEC-SCNC: 9.4 MG/DL (ref 8.7–10.4)
CHLORIDE SERPL-SCNC: 106 MMOL/L (ref 99–109)
CO2 SERPL-SCNC: 26 MMOL/L (ref 20–31)
CREAT BLD-MCNC: 0.61 MG/DL (ref 0.6–1.3)
ERYTHROCYTE [DISTWIDTH] IN BLOOD BY AUTOMATED COUNT: 17.1 % (ref 11.3–14.5)
GFR SERPL CREATININE-BSD FRML MDRD: 110 ML/MIN/1.73
GLOBULIN UR ELPH-MCNC: 1.7 GM/DL
GLUCOSE BLD-MCNC: 193 MG/DL (ref 70–100)
HCT VFR BLD AUTO: 29.5 % (ref 34.5–44)
HGB BLD-MCNC: 9.7 G/DL (ref 11.5–15.5)
LYMPHOCYTES # BLD AUTO: 0.6 10*3/MM3 (ref 0.6–4.8)
LYMPHOCYTES NFR BLD AUTO: 8.2 % (ref 24–44)
MCH RBC QN AUTO: 30.2 PG (ref 27–31)
MCHC RBC AUTO-ENTMCNC: 32.9 G/DL (ref 32–36)
MCV RBC AUTO: 91.9 FL (ref 80–99)
MONOCYTES # BLD AUTO: 0.2 10*3/MM3 (ref 0–1)
MONOCYTES NFR BLD AUTO: 3 % (ref 0–12)
NEUTROPHILS # BLD AUTO: 6.4 10*3/MM3 (ref 1.5–8.3)
NEUTROPHILS NFR BLD AUTO: 88.8 % (ref 41–71)
PLATELET # BLD AUTO: 482 10*3/MM3 (ref 150–450)
PMV BLD AUTO: 7 FL (ref 6–12)
POTASSIUM BLD-SCNC: 4.1 MMOL/L (ref 3.5–5.5)
PROT SERPL-MCNC: 6.2 G/DL (ref 5.7–8.2)
RBC # BLD AUTO: 3.21 10*6/MM3 (ref 3.89–5.14)
SODIUM BLD-SCNC: 140 MMOL/L (ref 132–146)
WBC NRBC COR # BLD: 7.2 10*3/MM3 (ref 3.5–10.8)

## 2018-10-03 PROCEDURE — 80053 COMPREHEN METABOLIC PANEL: CPT

## 2018-10-03 PROCEDURE — 25010000002 DOXORUBICIN PER 10 MG: Performed by: INTERNAL MEDICINE

## 2018-10-03 PROCEDURE — 25010000002 DOCETAXEL 20 MG/ML SOLUTION 1 ML VIAL: Performed by: INTERNAL MEDICINE

## 2018-10-03 PROCEDURE — 25010000002 PEGFILGRASTIM 6 MG/0.6ML PREFILLED SYRINGE KIT: Performed by: INTERNAL MEDICINE

## 2018-10-03 PROCEDURE — 25010000002 PALONOSETRON PER 25 MCG: Performed by: INTERNAL MEDICINE

## 2018-10-03 PROCEDURE — 96375 TX/PRO/DX INJ NEW DRUG ADDON: CPT

## 2018-10-03 PROCEDURE — 96417 CHEMO IV INFUS EACH ADDL SEQ: CPT

## 2018-10-03 PROCEDURE — 25010000002 CYCLOPHOSPHAMIDE PER 100 MG: Performed by: INTERNAL MEDICINE

## 2018-10-03 PROCEDURE — 99214 OFFICE O/P EST MOD 30 MIN: CPT | Performed by: INTERNAL MEDICINE

## 2018-10-03 PROCEDURE — 25010000002 DOCETAXEL 20 MG/ML SOLUTION 4 ML VIAL: Performed by: INTERNAL MEDICINE

## 2018-10-03 PROCEDURE — 96377 APPLICATON ON-BODY INJECTOR: CPT

## 2018-10-03 PROCEDURE — 96411 CHEMO IV PUSH ADDL DRUG: CPT

## 2018-10-03 PROCEDURE — 85025 COMPLETE CBC W/AUTO DIFF WBC: CPT

## 2018-10-03 PROCEDURE — 25010000002 FOSAPREPITANT PER 1 MG: Performed by: INTERNAL MEDICINE

## 2018-10-03 PROCEDURE — 96409 CHEMO IV PUSH SNGL DRUG: CPT

## 2018-10-03 PROCEDURE — 96413 CHEMO IV INFUSION 1 HR: CPT

## 2018-10-03 RX ORDER — SODIUM CHLORIDE 0.9 % (FLUSH) 0.9 %
10 SYRINGE (ML) INJECTION AS NEEDED
Status: CANCELLED | OUTPATIENT
Start: 2018-10-03

## 2018-10-03 RX ORDER — SODIUM CHLORIDE 0.9 % (FLUSH) 0.9 %
10 SYRINGE (ML) INJECTION AS NEEDED
Status: DISCONTINUED | OUTPATIENT
Start: 2018-10-03 | End: 2018-10-03 | Stop reason: HOSPADM

## 2018-10-03 RX ORDER — DOXORUBICIN HYDROCHLORIDE 2 MG/ML
40 INJECTION, SOLUTION INTRAVENOUS ONCE
Status: CANCELLED | OUTPATIENT
Start: 2018-10-03

## 2018-10-03 RX ORDER — PALONOSETRON 0.05 MG/ML
0.25 INJECTION, SOLUTION INTRAVENOUS ONCE
Status: CANCELLED | OUTPATIENT
Start: 2018-10-03

## 2018-10-03 RX ORDER — DOXORUBICIN HYDROCHLORIDE 2 MG/ML
40 INJECTION, SOLUTION INTRAVENOUS ONCE
Status: COMPLETED | OUTPATIENT
Start: 2018-10-03 | End: 2018-10-03

## 2018-10-03 RX ORDER — PALONOSETRON 0.05 MG/ML
0.25 INJECTION, SOLUTION INTRAVENOUS ONCE
Status: COMPLETED | OUTPATIENT
Start: 2018-10-03 | End: 2018-10-03

## 2018-10-03 RX ORDER — SODIUM CHLORIDE 9 MG/ML
250 INJECTION, SOLUTION INTRAVENOUS ONCE
Status: CANCELLED | OUTPATIENT
Start: 2018-10-03

## 2018-10-03 RX ORDER — SODIUM CHLORIDE 9 MG/ML
250 INJECTION, SOLUTION INTRAVENOUS ONCE
Status: COMPLETED | OUTPATIENT
Start: 2018-10-03 | End: 2018-10-03

## 2018-10-03 RX ADMIN — DOXORUBICIN HYDROCHLORIDE 74 MG: 2 INJECTION, SOLUTION INTRAVENOUS at 12:19

## 2018-10-03 RX ADMIN — PALONOSETRON HYDROCHLORIDE 0.25 MG: 0.25 INJECTION INTRAVENOUS at 11:50

## 2018-10-03 RX ADMIN — DOCETAXEL 110 MG: 20 INJECTION, SOLUTION, CONCENTRATE INTRAVENOUS at 13:21

## 2018-10-03 RX ADMIN — SODIUM CHLORIDE 150 MG: 9 INJECTION, SOLUTION INTRAVENOUS at 12:02

## 2018-10-03 RX ADMIN — Medication 10 ML: at 14:39

## 2018-10-03 RX ADMIN — SODIUM CHLORIDE 250 ML: 9 INJECTION, SOLUTION INTRAVENOUS at 11:50

## 2018-10-03 RX ADMIN — HEPARIN 500 UNITS: 100 SYRINGE at 14:39

## 2018-10-03 RX ADMIN — CYCLOPHOSPHAMIDE 750 MG: 1 INJECTION, POWDER, FOR SOLUTION INTRAVENOUS; ORAL at 12:40

## 2018-10-03 RX ADMIN — PEGFILGRASTIM 6 MG: KIT SUBCUTANEOUS at 14:36

## 2018-10-03 NOTE — PROGRESS NOTES
"      PROBLEM LIST:  1. [id number] ER weakly positive, MS negative, Her2 negative invasive ductal carcinoma of the left breast  A) presented with a palpable breast mass.  Biopsy showed ER+, MS- Her2- IDC, grade 3.  B) neoadjuvant TAC started July 2018.  Complicated by neutropenic fever after cycle 4.  Dose reduced to 80% for cycles 5 and 6.    Subjective     HISTORY OF PRESENT ILLNESS:   Ina Ram returns for follow-up after 4 cycles of TAC.  After her last cycle she developed neutropenic fever and was admitted to the hospital close to her home.  She was treated initially with IV antibiotics and then discharged on levaquin.  She says after leaving the hospital she felt dizzy for a few days but this slowly resolved.  Over the past week she has felt pretty good.    Past Medical History, Past Surgical History, Social History, Family History have been reviewed and are without significant changes except as mentioned.    Review of Systems   A comprehensive 14 point review of systems was performed and was negative except as mentioned.    Medications:  The current medication list was reviewed in the EMR    ALLERGIES:  No Known Allergies    Objective      /70   Pulse 88   Temp 98.2 °F (36.8 °C) (Temporal Artery )   Resp 14   Ht 175.3 cm (69.02\")   Wt 74.2 kg (163 lb 8 oz)   SpO2 99% Comment: RA  BMI 24.13 kg/m²      Performance Status: 0    General: well appearing female in no acute distress  Neuro: alert and oriented  HEENT: sclera anicteric, oropharynx clear  Lymphatics: no cervical, supraclavicular, or axillary adenopathy  Cardiovascular: regular rate and rhythm, no murmurs  Breast: I'm not able to feel a distinct mass in the left breast today.  Lungs: clear to auscultation bilaterally  Abdomen: soft, nontender, nondistended.  No palpable organomegaly  Extremeties: no lower extremity edema  Skin: no rashes, lesions, bruising, or petechiae  Psych: mood and affect appropriate          Assessment/Plan "   Ina Ram is a 38 y.o. year old female with a stage II B ER weakly positive HER-2 negative breast cancer who returns for follow up on neoadjuvant chemotherapy.  She has an excellent clinical response with a longer palpable mass in the breast.  We will continue with cycle 5 with an 80% dose reduction today.  She knows to call in the event of fever or other concerning symptoms.  I will see her back in 3 weeks for cycle 6.    We briefly discussed the role of radiation after mastectomy.  At this point she is leaning towards bilateral mastectomy with reconstruction.  I told her if there was disease in the lymph nodes at the time of surgery, we would still recommend postmastectomy radiation.    She may be a candidate for clinical trials after completion of chemotherapy and surgery.  We will screen for this postop.      Follow-up in 3 weeks.                  Visit time was 25 minutes, greater than 50% spent in counseling      Fifi Malloy MD  [facility] Hematology and Oncology    10/3/2018          CC:

## 2018-10-24 ENCOUNTER — OFFICE VISIT (OUTPATIENT)
Dept: ONCOLOGY | Facility: CLINIC | Age: 38
End: 2018-10-24

## 2018-10-24 ENCOUNTER — INFUSION (OUTPATIENT)
Dept: ONCOLOGY | Facility: HOSPITAL | Age: 38
End: 2018-10-24

## 2018-10-24 VITALS
DIASTOLIC BLOOD PRESSURE: 71 MMHG | SYSTOLIC BLOOD PRESSURE: 123 MMHG | HEIGHT: 69 IN | RESPIRATION RATE: 16 BRPM | HEART RATE: 73 BPM | TEMPERATURE: 97.5 F | OXYGEN SATURATION: 99 % | WEIGHT: 166 LBS | BODY MASS INDEX: 24.59 KG/M2

## 2018-10-24 DIAGNOSIS — Z17.0 MALIGNANT NEOPLASM OF UPPER-OUTER QUADRANT OF LEFT BREAST IN FEMALE, ESTROGEN RECEPTOR POSITIVE (HCC): Primary | ICD-10-CM

## 2018-10-24 DIAGNOSIS — C50.412 MALIGNANT NEOPLASM OF UPPER-OUTER QUADRANT OF LEFT BREAST IN FEMALE, ESTROGEN RECEPTOR POSITIVE (HCC): Primary | ICD-10-CM

## 2018-10-24 LAB
ALBUMIN SERPL-MCNC: 4.46 G/DL (ref 3.2–4.8)
ALBUMIN/GLOB SERPL: 2.9 G/DL (ref 1.5–2.5)
ALP SERPL-CCNC: 56 U/L (ref 25–100)
ALT SERPL W P-5'-P-CCNC: 42 U/L (ref 7–40)
ANION GAP SERPL CALCULATED.3IONS-SCNC: 9 MMOL/L (ref 3–11)
AST SERPL-CCNC: 20 U/L (ref 0–33)
BILIRUB SERPL-MCNC: 0.2 MG/DL (ref 0.3–1.2)
BUN BLD-MCNC: 12 MG/DL (ref 9–23)
BUN/CREAT SERPL: 24.5 (ref 7–25)
CALCIUM SPEC-SCNC: 9.2 MG/DL (ref 8.7–10.4)
CHLORIDE SERPL-SCNC: 106 MMOL/L (ref 99–109)
CO2 SERPL-SCNC: 24 MMOL/L (ref 20–31)
CREAT BLD-MCNC: 0.49 MG/DL (ref 0.6–1.3)
ERYTHROCYTE [DISTWIDTH] IN BLOOD BY AUTOMATED COUNT: 16.6 % (ref 11.3–14.5)
GFR SERPL CREATININE-BSD FRML MDRD: 141 ML/MIN/1.73
GLOBULIN UR ELPH-MCNC: 1.5 GM/DL
GLUCOSE BLD-MCNC: 156 MG/DL (ref 70–100)
HCT VFR BLD AUTO: 29 % (ref 34.5–44)
HGB BLD-MCNC: 9.5 G/DL (ref 11.5–15.5)
LYMPHOCYTES # BLD AUTO: 0.7 10*3/MM3 (ref 0.6–4.8)
LYMPHOCYTES NFR BLD AUTO: 8.2 % (ref 24–44)
MCH RBC QN AUTO: 30.5 PG (ref 27–31)
MCHC RBC AUTO-ENTMCNC: 32.8 G/DL (ref 32–36)
MCV RBC AUTO: 93 FL (ref 80–99)
MONOCYTES # BLD AUTO: 0.2 10*3/MM3 (ref 0–1)
MONOCYTES NFR BLD AUTO: 2.9 % (ref 0–12)
NEUTROPHILS # BLD AUTO: 7.1 10*3/MM3 (ref 1.5–8.3)
NEUTROPHILS NFR BLD AUTO: 88.9 % (ref 41–71)
PLATELET # BLD AUTO: 319 10*3/MM3 (ref 150–450)
PMV BLD AUTO: 7.7 FL (ref 6–12)
POTASSIUM BLD-SCNC: 3.8 MMOL/L (ref 3.5–5.5)
PROT SERPL-MCNC: 6 G/DL (ref 5.7–8.2)
RBC # BLD AUTO: 3.11 10*6/MM3 (ref 3.89–5.14)
SODIUM BLD-SCNC: 139 MMOL/L (ref 132–146)
WBC NRBC COR # BLD: 8 10*3/MM3 (ref 3.5–10.8)

## 2018-10-24 PROCEDURE — 25010000002 PEGFILGRASTIM 6 MG/0.6ML PREFILLED SYRINGE KIT: Performed by: INTERNAL MEDICINE

## 2018-10-24 PROCEDURE — 96377 APPLICATON ON-BODY INJECTOR: CPT

## 2018-10-24 PROCEDURE — 96367 TX/PROPH/DG ADDL SEQ IV INF: CPT

## 2018-10-24 PROCEDURE — 25010000002 PALONOSETRON PER 25 MCG: Performed by: INTERNAL MEDICINE

## 2018-10-24 PROCEDURE — 96413 CHEMO IV INFUSION 1 HR: CPT

## 2018-10-24 PROCEDURE — 25010000002 DOCETAXEL 20 MG/ML SOLUTION 4 ML VIAL: Performed by: INTERNAL MEDICINE

## 2018-10-24 PROCEDURE — 85025 COMPLETE CBC W/AUTO DIFF WBC: CPT

## 2018-10-24 PROCEDURE — 96417 CHEMO IV INFUS EACH ADDL SEQ: CPT

## 2018-10-24 PROCEDURE — 25010000002 CYCLOPHOSPHAMIDE PER 100 MG: Performed by: INTERNAL MEDICINE

## 2018-10-24 PROCEDURE — 80053 COMPREHEN METABOLIC PANEL: CPT

## 2018-10-24 PROCEDURE — 96366 THER/PROPH/DIAG IV INF ADDON: CPT

## 2018-10-24 PROCEDURE — 99214 OFFICE O/P EST MOD 30 MIN: CPT | Performed by: INTERNAL MEDICINE

## 2018-10-24 PROCEDURE — 25010000002 FOSAPREPITANT PER 1 MG: Performed by: INTERNAL MEDICINE

## 2018-10-24 PROCEDURE — 25010000002 DOXORUBICIN PER 10 MG: Performed by: INTERNAL MEDICINE

## 2018-10-24 PROCEDURE — 96375 TX/PRO/DX INJ NEW DRUG ADDON: CPT

## 2018-10-24 PROCEDURE — 96411 CHEMO IV PUSH ADDL DRUG: CPT

## 2018-10-24 RX ORDER — DOXORUBICIN HYDROCHLORIDE 2 MG/ML
40 INJECTION, SOLUTION INTRAVENOUS ONCE
Status: CANCELLED | OUTPATIENT
Start: 2018-10-24

## 2018-10-24 RX ORDER — HEPARIN SODIUM (PORCINE) LOCK FLUSH IV SOLN 100 UNIT/ML 100 UNIT/ML
500 SOLUTION INTRAVENOUS AS NEEDED
Status: CANCELLED | OUTPATIENT
Start: 2018-10-24

## 2018-10-24 RX ORDER — AMOXICILLIN AND CLAVULANATE POTASSIUM 875; 125 MG/1; MG/1
TABLET, FILM COATED ORAL
COMMUNITY
Start: 2018-10-22 | End: 2018-11-26

## 2018-10-24 RX ORDER — PALONOSETRON 0.05 MG/ML
0.25 INJECTION, SOLUTION INTRAVENOUS ONCE
Status: COMPLETED | OUTPATIENT
Start: 2018-10-24 | End: 2018-10-24

## 2018-10-24 RX ORDER — LORATADINE 10 MG/1
10 TABLET ORAL
COMMUNITY
Start: 2018-10-02 | End: 2018-11-26

## 2018-10-24 RX ORDER — PALONOSETRON 0.05 MG/ML
0.25 INJECTION, SOLUTION INTRAVENOUS ONCE
Status: CANCELLED | OUTPATIENT
Start: 2018-10-24

## 2018-10-24 RX ORDER — SODIUM CHLORIDE 9 MG/ML
250 INJECTION, SOLUTION INTRAVENOUS ONCE
Status: CANCELLED | OUTPATIENT
Start: 2018-10-24

## 2018-10-24 RX ORDER — DOXORUBICIN HYDROCHLORIDE 2 MG/ML
40 INJECTION, SOLUTION INTRAVENOUS ONCE
Status: COMPLETED | OUTPATIENT
Start: 2018-10-24 | End: 2018-10-24

## 2018-10-24 RX ORDER — SODIUM CHLORIDE 0.9 % (FLUSH) 0.9 %
10 SYRINGE (ML) INJECTION AS NEEDED
Status: CANCELLED | OUTPATIENT
Start: 2018-10-24

## 2018-10-24 RX ORDER — SODIUM CHLORIDE 9 MG/ML
250 INJECTION, SOLUTION INTRAVENOUS ONCE
Status: COMPLETED | OUTPATIENT
Start: 2018-10-24 | End: 2018-10-24

## 2018-10-24 RX ADMIN — SODIUM CHLORIDE 150 MG: 9 INJECTION, SOLUTION INTRAVENOUS at 11:33

## 2018-10-24 RX ADMIN — PEGFILGRASTIM 6 MG: KIT SUBCUTANEOUS at 14:09

## 2018-10-24 RX ADMIN — PALONOSETRON HYDROCHLORIDE 0.25 MG: 0.25 INJECTION, SOLUTION INTRAVENOUS at 11:33

## 2018-10-24 RX ADMIN — SODIUM CHLORIDE 250 ML: 9 INJECTION, SOLUTION INTRAVENOUS at 11:30

## 2018-10-24 RX ADMIN — HEPARIN 500 UNITS: 100 SYRINGE at 14:12

## 2018-10-24 RX ADMIN — DOXORUBICIN HYDROCHLORIDE 74 MG: 2 INJECTION, SOLUTION INTRAVENOUS at 12:20

## 2018-10-24 RX ADMIN — CYCLOPHOSPHAMIDE 750 MG: 1 INJECTION, POWDER, FOR SOLUTION INTRAVENOUS; ORAL at 12:28

## 2018-10-24 RX ADMIN — DOCETAXEL 80 MG: 20 INJECTION, SOLUTION, CONCENTRATE INTRAVENOUS at 13:11

## 2018-10-24 NOTE — PROGRESS NOTES
"      PROBLEM LIST:  1. [id number] ER weakly positive, TX negative, Her2 negative invasive ductal carcinoma of the left breast  A) presented with a palpable breast mass.  Biopsy showed ER+, TX- Her2- IDC, grade 3.  B) neoadjuvant TAC started July 2018.  Complicated by neutropenic fever after cycle 4.  Dose reduced to 80% for cycles 5 and 6.    Subjective     HISTORY OF PRESENT ILLNESS:   Ina Ram returns for follow-up after 5 cycles of TAC.  Says she felt pretty well after this last cycle until the end of last week.  She developed a low-grade fever.  She went to her doctor and had labs and a chest x-ray.  The chest x-ray was normal.  Her labs showed a slightly elevated white blood cell count.  She has been taking Augmentin for almost a week.  She has not had any fever for about 4 days.  She says she feels well but still has a residual cough.  She also reports she's noticed some mild tingling in her toes that has not improved since her last cycle.    Past Medical History, Past Surgical History, Social History, Family History have been reviewed and are without significant changes except as mentioned.    Review of Systems   A comprehensive 14 point review of systems was performed and was negative except as mentioned.    Medications:  The current medication list was reviewed in the EMR    ALLERGIES:  No Known Allergies    Objective      /71 Comment: RUE  Pulse 73   Temp 97.5 °F (36.4 °C) (Oral)   Resp 16   Ht 175.3 cm (69\")   Wt 75.3 kg (166 lb)   SpO2 99% Comment: RA  BMI 24.51 kg/m²      Performance Status: 0    General: well appearing female in no acute distress  Neuro: alert and oriented  HEENT: sclera anicteric, oropharynx clear  Lymphatics: no cervical, supraclavicular, or axillary adenopathy  Cardiovascular: regular rate and rhythm, no murmurs  Lungs: clear to auscultation bilaterally  Abdomen: soft, nontender, nondistended.  No palpable organomegaly  Extremeties: no lower extremity edema  Skin: " no rashes, lesions, bruising, or petechiae  Psych: mood and affect appropriate          Assessment/Plan   Ina Ram is a 38 y.o. year old female with a stage II B ER weakly positive HER-2 negative breast cancer who returns for follow up on neoadjuvant chemotherapy.  She has had an upper respiratory infection but has been on antibiotics for nearly a week and afebrile for about 4 days.  She seems to be mostly recovered from this and I think it is safe to proceed with chemotherapy.  We will proceed with cycle 6 of chemotherapy today, still with a 80% dose reduction given her prior neutropenic fever.  She has developed some persistent neuropathy so I will reduce the Taxotere to 60%.  She has had a good clinical response to treatment.    We will get her scheduled to see Dr. Chaudhary in the near future to discuss surgery.    She may be a candidate for clinical trials after completion of chemotherapy and surgery.  We will screen for this postop.      Follow-up in about 2 months.                  Visit time was 25 minutes, greater than 50% spent in counseling      Fifi Malloy MD  [facility] Hematology and Oncology    10/24/2018          CC:

## 2018-11-14 ENCOUNTER — TRANSCRIBE ORDERS (OUTPATIENT)
Dept: ADMINISTRATIVE | Facility: HOSPITAL | Age: 38
End: 2018-11-14

## 2018-11-14 DIAGNOSIS — C50.812 MALIGNANT NEOPLASM OF OVERLAPPING SITES OF LEFT FEMALE BREAST, UNSPECIFIED ESTROGEN RECEPTOR STATUS (HCC): Primary | ICD-10-CM

## 2018-11-19 ENCOUNTER — DOCUMENTATION (OUTPATIENT)
Dept: OTHER | Facility: HOSPITAL | Age: 38
End: 2018-11-19

## 2018-11-19 NOTE — PROGRESS NOTES
Patients sister, Marcia Cornell called to ask if a referral could be made to [facility] for 11/26/18 night before mastectomy surgery for patient - I sent referral to [facility] via email and let Marcia know that the [facility] will be calling her tomorrow to let her know if there will be a room available.

## 2018-11-26 ENCOUNTER — APPOINTMENT (OUTPATIENT)
Dept: PREADMISSION TESTING | Facility: HOSPITAL | Age: 38
End: 2018-11-26

## 2018-11-26 ENCOUNTER — ANESTHESIA EVENT (OUTPATIENT)
Dept: PERIOP | Facility: HOSPITAL | Age: 38
End: 2018-11-26

## 2018-11-26 VITALS — WEIGHT: 171.3 LBS | BODY MASS INDEX: 25.37 KG/M2 | HEIGHT: 69 IN

## 2018-11-26 LAB
ALBUMIN SERPL-MCNC: 4.45 G/DL (ref 3.2–4.8)
ALBUMIN/GLOB SERPL: 2.5 G/DL (ref 1.5–2.5)
ALP SERPL-CCNC: 60 U/L (ref 25–100)
ALT SERPL W P-5'-P-CCNC: 33 U/L (ref 7–40)
ANION GAP SERPL CALCULATED.3IONS-SCNC: 7 MMOL/L (ref 3–11)
AST SERPL-CCNC: 28 U/L (ref 0–33)
BILIRUB SERPL-MCNC: 0.2 MG/DL (ref 0.3–1.2)
BUN BLD-MCNC: 6 MG/DL (ref 9–23)
BUN/CREAT SERPL: 10 (ref 7–25)
CALCIUM SPEC-SCNC: 9.4 MG/DL (ref 8.7–10.4)
CHLORIDE SERPL-SCNC: 106 MMOL/L (ref 99–109)
CO2 SERPL-SCNC: 27 MMOL/L (ref 20–31)
CREAT BLD-MCNC: 0.6 MG/DL (ref 0.6–1.3)
DEPRECATED RDW RBC AUTO: 48.5 FL (ref 37–54)
ERYTHROCYTE [DISTWIDTH] IN BLOOD BY AUTOMATED COUNT: 14.4 % (ref 11.3–14.5)
GFR SERPL CREATININE-BSD FRML MDRD: 112 ML/MIN/1.73
GLOBULIN UR ELPH-MCNC: 1.8 GM/DL
GLUCOSE BLD-MCNC: 87 MG/DL (ref 70–100)
HCT VFR BLD AUTO: 36.2 % (ref 34.5–44)
HGB BLD-MCNC: 11.5 G/DL (ref 11.5–15.5)
MCH RBC QN AUTO: 29.4 PG (ref 27–31)
MCHC RBC AUTO-ENTMCNC: 31.8 G/DL (ref 32–36)
MCV RBC AUTO: 92.6 FL (ref 80–99)
PLATELET # BLD AUTO: 260 10*3/MM3 (ref 150–450)
PMV BLD AUTO: 10.1 FL (ref 6–12)
POTASSIUM BLD-SCNC: 3.9 MMOL/L (ref 3.5–5.5)
PROT SERPL-MCNC: 6.2 G/DL (ref 5.7–8.2)
RBC # BLD AUTO: 3.91 10*6/MM3 (ref 3.89–5.14)
SODIUM BLD-SCNC: 140 MMOL/L (ref 132–146)
WBC NRBC COR # BLD: 5.92 10*3/MM3 (ref 3.5–10.8)

## 2018-11-26 PROCEDURE — 93010 ELECTROCARDIOGRAM REPORT: CPT | Performed by: INTERNAL MEDICINE

## 2018-11-26 PROCEDURE — 85027 COMPLETE CBC AUTOMATED: CPT | Performed by: SURGERY

## 2018-11-26 PROCEDURE — 93005 ELECTROCARDIOGRAM TRACING: CPT

## 2018-11-26 PROCEDURE — 36415 COLL VENOUS BLD VENIPUNCTURE: CPT

## 2018-11-26 PROCEDURE — 80053 COMPREHEN METABOLIC PANEL: CPT | Performed by: SURGERY

## 2018-11-26 NOTE — DISCHARGE INSTRUCTIONS

## 2018-11-27 ENCOUNTER — HOSPITAL ENCOUNTER (OUTPATIENT)
Facility: HOSPITAL | Age: 38
Discharge: HOME | End: 2018-11-28
Attending: SURGERY | Admitting: SURGERY

## 2018-11-27 ENCOUNTER — ANESTHESIA (OUTPATIENT)
Dept: PERIOP | Facility: HOSPITAL | Age: 38
End: 2018-11-27

## 2018-11-27 ENCOUNTER — HOSPITAL ENCOUNTER (OUTPATIENT)
Dept: NUCLEAR MEDICINE | Facility: HOSPITAL | Age: 38
Discharge: HOME | End: 2018-11-27
Attending: SURGERY

## 2018-11-27 DIAGNOSIS — C50.812 MALIGNANT NEOPLASM OF OVERLAPPING SITES OF LEFT FEMALE BREAST, UNSPECIFIED ESTROGEN RECEPTOR STATUS (HCC): ICD-10-CM

## 2018-11-27 DIAGNOSIS — C50.812 MALIGNANT NEOPLASM OF OVERLAPPING SITES OF LEFT FEMALE BREAST (HCC): ICD-10-CM

## 2018-11-27 LAB
B-HCG UR QL: NEGATIVE
INTERNAL NEGATIVE CONTROL: NEGATIVE
INTERNAL POSITIVE CONTROL: POSITIVE
Lab: NORMAL

## 2018-11-27 PROCEDURE — 88331 PATH CONSLTJ SURG 1 BLK 1SPC: CPT | Performed by: PATHOLOGY

## 2018-11-27 PROCEDURE — A9541 TC99M SULFUR COLLOID: HCPCS | Performed by: SURGERY

## 2018-11-27 PROCEDURE — 25010000002 FENTANYL CITRATE (PF) 100 MCG/2ML SOLUTION: Performed by: NURSE ANESTHETIST, CERTIFIED REGISTERED

## 2018-11-27 PROCEDURE — 25010000002 PROPOFOL 1000 MG/ML EMULSION: Performed by: NURSE ANESTHETIST, CERTIFIED REGISTERED

## 2018-11-27 PROCEDURE — 88305 TISSUE EXAM BY PATHOLOGIST: CPT | Performed by: SURGERY

## 2018-11-27 PROCEDURE — 25010000002 NEOSTIGMINE 10 MG/10ML SOLUTION: Performed by: NURSE ANESTHETIST, CERTIFIED REGISTERED

## 2018-11-27 PROCEDURE — 38792 RA TRACER ID OF SENTINL NODE: CPT

## 2018-11-27 PROCEDURE — 0 TECHNETIUM FILTERED SULFUR COLLOID: Performed by: SURGERY

## 2018-11-27 PROCEDURE — 88342 IMHCHEM/IMCYTCHM 1ST ANTB: CPT | Performed by: SURGERY

## 2018-11-27 PROCEDURE — C1789 PROSTHESIS, BREAST, IMP: HCPCS | Performed by: SURGERY

## 2018-11-27 PROCEDURE — 25010000002 BUPRENORPHINE PER 0.1 MG: Performed by: ANESTHESIOLOGY

## 2018-11-27 PROCEDURE — 25010000002 DEXAMETHASONE SODIUM PHOSPHATE 10 MG/ML SOLUTION: Performed by: ANESTHESIOLOGY

## 2018-11-27 PROCEDURE — 25010000002 PROPOFOL 10 MG/ML EMULSION: Performed by: NURSE ANESTHETIST, CERTIFIED REGISTERED

## 2018-11-27 PROCEDURE — 25010000002 ONDANSETRON PER 1 MG: Performed by: NURSE ANESTHETIST, CERTIFIED REGISTERED

## 2018-11-27 PROCEDURE — 81025 URINE PREGNANCY TEST: CPT | Performed by: ANESTHESIOLOGY

## 2018-11-27 PROCEDURE — 25010000002 DEXAMETHASONE PER 1 MG: Performed by: NURSE ANESTHETIST, CERTIFIED REGISTERED

## 2018-11-27 PROCEDURE — 25010000003 CEFAZOLIN IN DEXTROSE 2-4 GM/100ML-% SOLUTION: Performed by: PLASTIC SURGERY

## 2018-11-27 PROCEDURE — 88307 TISSUE EXAM BY PATHOLOGIST: CPT | Performed by: SURGERY

## 2018-11-27 PROCEDURE — 25010000003 CEFAZOLIN IN DEXTROSE 2-4 GM/100ML-% SOLUTION: Performed by: SURGERY

## 2018-11-27 PROCEDURE — 88360 TUMOR IMMUNOHISTOCHEM/MANUAL: CPT | Performed by: SURGERY

## 2018-11-27 DEVICE — EXPNDR BRST SALN F/HT XPROJ STL133FX W/TAB 550CC: Type: IMPLANTABLE DEVICE | Site: BREAST | Status: FUNCTIONAL

## 2018-11-27 DEVICE — GRFT TISS ALLODERM RTM 16X20CM 2.4MM/THK .4MM: Type: IMPLANTABLE DEVICE | Site: BREAST | Status: FUNCTIONAL

## 2018-11-27 RX ORDER — GLYCOPYRROLATE 0.2 MG/ML
INJECTION INTRAMUSCULAR; INTRAVENOUS AS NEEDED
Status: DISCONTINUED | OUTPATIENT
Start: 2018-11-27 | End: 2018-11-27 | Stop reason: SURG

## 2018-11-27 RX ORDER — SODIUM CHLORIDE 0.9 % (FLUSH) 0.9 %
3 SYRINGE (ML) INJECTION EVERY 12 HOURS SCHEDULED
Status: CANCELLED | OUTPATIENT
Start: 2018-11-27

## 2018-11-27 RX ORDER — MAGNESIUM HYDROXIDE 1200 MG/15ML
LIQUID ORAL AS NEEDED
Status: DISCONTINUED | OUTPATIENT
Start: 2018-11-27 | End: 2018-11-27 | Stop reason: HOSPADM

## 2018-11-27 RX ORDER — PROMETHAZINE HYDROCHLORIDE 25 MG/ML
12.5 INJECTION, SOLUTION INTRAMUSCULAR; INTRAVENOUS ONCE AS NEEDED
Status: DISCONTINUED | OUTPATIENT
Start: 2018-11-27 | End: 2018-11-27 | Stop reason: HOSPADM

## 2018-11-27 RX ORDER — SODIUM CHLORIDE, SODIUM LACTATE, POTASSIUM CHLORIDE, CALCIUM CHLORIDE 600; 310; 30; 20 MG/100ML; MG/100ML; MG/100ML; MG/100ML
9 INJECTION, SOLUTION INTRAVENOUS CONTINUOUS
Status: DISCONTINUED | OUTPATIENT
Start: 2018-11-27 | End: 2018-11-28 | Stop reason: HOSPADM

## 2018-11-27 RX ORDER — BUPIVACAINE HYDROCHLORIDE 2.5 MG/ML
INJECTION, SOLUTION EPIDURAL; INFILTRATION; INTRACAUDAL
Status: COMPLETED | OUTPATIENT
Start: 2018-11-27 | End: 2018-11-27

## 2018-11-27 RX ORDER — MEPERIDINE HYDROCHLORIDE 25 MG/ML
12.5 INJECTION INTRAMUSCULAR; INTRAVENOUS; SUBCUTANEOUS
Status: DISCONTINUED | OUTPATIENT
Start: 2018-11-27 | End: 2018-11-27 | Stop reason: HOSPADM

## 2018-11-27 RX ORDER — ACETAMINOPHEN 500 MG
1000 TABLET ORAL EVERY 6 HOURS
Status: DISCONTINUED | OUTPATIENT
Start: 2018-11-27 | End: 2018-11-28 | Stop reason: HOSPADM

## 2018-11-27 RX ORDER — ESMOLOL HYDROCHLORIDE 10 MG/ML
INJECTION INTRAVENOUS AS NEEDED
Status: DISCONTINUED | OUTPATIENT
Start: 2018-11-27 | End: 2018-11-27 | Stop reason: SURG

## 2018-11-27 RX ORDER — FAMOTIDINE 20 MG/1
20 TABLET, FILM COATED ORAL ONCE
Status: COMPLETED | OUTPATIENT
Start: 2018-11-27 | End: 2018-11-27

## 2018-11-27 RX ORDER — PREGABALIN 75 MG/1
75 CAPSULE ORAL ONCE
Status: COMPLETED | OUTPATIENT
Start: 2018-11-27 | End: 2018-11-27

## 2018-11-27 RX ORDER — CEFAZOLIN SODIUM 2 G/100ML
2 INJECTION, SOLUTION INTRAVENOUS EVERY 8 HOURS
Status: COMPLETED | OUTPATIENT
Start: 2018-11-27 | End: 2018-11-28

## 2018-11-27 RX ORDER — ACETAMINOPHEN 500 MG
1000 TABLET ORAL ONCE
Status: COMPLETED | OUTPATIENT
Start: 2018-11-27 | End: 2018-11-27

## 2018-11-27 RX ORDER — LIDOCAINE HYDROCHLORIDE 10 MG/ML
INJECTION, SOLUTION EPIDURAL; INFILTRATION; INTRACAUDAL; PERINEURAL AS NEEDED
Status: DISCONTINUED | OUTPATIENT
Start: 2018-11-27 | End: 2018-11-27 | Stop reason: SURG

## 2018-11-27 RX ORDER — SODIUM CHLORIDE 9 MG/ML
50 INJECTION, SOLUTION INTRAVENOUS CONTINUOUS
Status: DISCONTINUED | OUTPATIENT
Start: 2018-11-27 | End: 2018-11-28 | Stop reason: HOSPADM

## 2018-11-27 RX ORDER — PROMETHAZINE HYDROCHLORIDE 25 MG/1
25 SUPPOSITORY RECTAL ONCE AS NEEDED
Status: DISCONTINUED | OUTPATIENT
Start: 2018-11-27 | End: 2018-11-27 | Stop reason: HOSPADM

## 2018-11-27 RX ORDER — FAMOTIDINE 10 MG/ML
20 INJECTION, SOLUTION INTRAVENOUS ONCE
Status: CANCELLED | OUTPATIENT
Start: 2018-11-27 | End: 2018-11-27

## 2018-11-27 RX ORDER — BUPRENORPHINE HYDROCHLORIDE 0.32 MG/ML
INJECTION INTRAMUSCULAR; INTRAVENOUS
Status: COMPLETED | OUTPATIENT
Start: 2018-11-27 | End: 2018-11-27

## 2018-11-27 RX ORDER — LABETALOL HYDROCHLORIDE 5 MG/ML
INJECTION, SOLUTION INTRAVENOUS AS NEEDED
Status: DISCONTINUED | OUTPATIENT
Start: 2018-11-27 | End: 2018-11-27 | Stop reason: SURG

## 2018-11-27 RX ORDER — DIAZEPAM 2 MG/1
2 TABLET ORAL EVERY 8 HOURS PRN
Status: DISCONTINUED | OUTPATIENT
Start: 2018-11-27 | End: 2018-11-28 | Stop reason: HOSPADM

## 2018-11-27 RX ORDER — HYDROMORPHONE HYDROCHLORIDE 1 MG/ML
0.3 INJECTION, SOLUTION INTRAMUSCULAR; INTRAVENOUS; SUBCUTANEOUS
Status: DISCONTINUED | OUTPATIENT
Start: 2018-11-27 | End: 2018-11-28 | Stop reason: HOSPADM

## 2018-11-27 RX ORDER — PROMETHAZINE HYDROCHLORIDE 25 MG/ML
6.25 INJECTION, SOLUTION INTRAMUSCULAR; INTRAVENOUS EVERY 6 HOURS PRN
Status: DISCONTINUED | OUTPATIENT
Start: 2018-11-27 | End: 2018-11-28 | Stop reason: HOSPADM

## 2018-11-27 RX ORDER — PROPOFOL 10 MG/ML
VIAL (ML) INTRAVENOUS AS NEEDED
Status: DISCONTINUED | OUTPATIENT
Start: 2018-11-27 | End: 2018-11-27 | Stop reason: SURG

## 2018-11-27 RX ORDER — ONDANSETRON 2 MG/ML
4 INJECTION INTRAMUSCULAR; INTRAVENOUS EVERY 6 HOURS PRN
Status: DISCONTINUED | OUTPATIENT
Start: 2018-11-27 | End: 2018-11-28 | Stop reason: HOSPADM

## 2018-11-27 RX ORDER — ONDANSETRON 2 MG/ML
INJECTION INTRAMUSCULAR; INTRAVENOUS AS NEEDED
Status: DISCONTINUED | OUTPATIENT
Start: 2018-11-27 | End: 2018-11-27 | Stop reason: SURG

## 2018-11-27 RX ORDER — BUPIVACAINE HYDROCHLORIDE AND EPINEPHRINE 2.5; 5 MG/ML; UG/ML
INJECTION, SOLUTION EPIDURAL; INFILTRATION; INTRACAUDAL; PERINEURAL AS NEEDED
Status: DISCONTINUED | OUTPATIENT
Start: 2018-11-27 | End: 2018-11-27 | Stop reason: HOSPADM

## 2018-11-27 RX ORDER — HYDROMORPHONE HYDROCHLORIDE 1 MG/ML
0.5 INJECTION, SOLUTION INTRAMUSCULAR; INTRAVENOUS; SUBCUTANEOUS
Status: DISCONTINUED | OUTPATIENT
Start: 2018-11-27 | End: 2018-11-27 | Stop reason: HOSPADM

## 2018-11-27 RX ORDER — SODIUM CHLORIDE 0.9 % (FLUSH) 0.9 %
3-10 SYRINGE (ML) INJECTION AS NEEDED
Status: CANCELLED | OUTPATIENT
Start: 2018-11-27

## 2018-11-27 RX ORDER — LIDOCAINE HYDROCHLORIDE 10 MG/ML
0.5 INJECTION, SOLUTION EPIDURAL; INFILTRATION; INTRACAUDAL; PERINEURAL ONCE AS NEEDED
Status: COMPLETED | OUTPATIENT
Start: 2018-11-27 | End: 2018-11-27

## 2018-11-27 RX ORDER — PROMETHAZINE HYDROCHLORIDE 25 MG/1
25 TABLET ORAL ONCE AS NEEDED
Status: DISCONTINUED | OUTPATIENT
Start: 2018-11-27 | End: 2018-11-27 | Stop reason: HOSPADM

## 2018-11-27 RX ORDER — DEXAMETHASONE SODIUM PHOSPHATE 10 MG/ML
INJECTION, SOLUTION INTRAMUSCULAR; INTRAVENOUS
Status: COMPLETED | OUTPATIENT
Start: 2018-11-27 | End: 2018-11-27

## 2018-11-27 RX ORDER — SODIUM CHLORIDE 9 MG/ML
INJECTION, SOLUTION INTRAVENOUS AS NEEDED
Status: DISCONTINUED | OUTPATIENT
Start: 2018-11-27 | End: 2018-11-27 | Stop reason: HOSPADM

## 2018-11-27 RX ORDER — NALOXONE HCL 0.4 MG/ML
0.1 VIAL (ML) INJECTION
Status: DISCONTINUED | OUTPATIENT
Start: 2018-11-27 | End: 2018-11-28 | Stop reason: HOSPADM

## 2018-11-27 RX ORDER — DEXAMETHASONE SODIUM PHOSPHATE 4 MG/ML
INJECTION, SOLUTION INTRA-ARTICULAR; INTRALESIONAL; INTRAMUSCULAR; INTRAVENOUS; SOFT TISSUE AS NEEDED
Status: DISCONTINUED | OUTPATIENT
Start: 2018-11-27 | End: 2018-11-27 | Stop reason: SURG

## 2018-11-27 RX ORDER — CEFAZOLIN SODIUM 2 G/100ML
2 INJECTION, SOLUTION INTRAVENOUS ONCE
Status: COMPLETED | OUTPATIENT
Start: 2018-11-27 | End: 2018-11-27

## 2018-11-27 RX ORDER — CELECOXIB 200 MG/1
200 CAPSULE ORAL EVERY 12 HOURS
Status: DISCONTINUED | OUTPATIENT
Start: 2018-11-27 | End: 2018-11-28 | Stop reason: HOSPADM

## 2018-11-27 RX ORDER — ATRACURIUM BESYLATE 10 MG/ML
INJECTION, SOLUTION INTRAVENOUS AS NEEDED
Status: DISCONTINUED | OUTPATIENT
Start: 2018-11-27 | End: 2018-11-27 | Stop reason: SURG

## 2018-11-27 RX ORDER — DIPHENHYDRAMINE HYDROCHLORIDE 50 MG/ML
12.5 INJECTION INTRAMUSCULAR; INTRAVENOUS EVERY 6 HOURS PRN
Status: DISCONTINUED | OUTPATIENT
Start: 2018-11-27 | End: 2018-11-28 | Stop reason: HOSPADM

## 2018-11-27 RX ORDER — SODIUM CHLORIDE, SODIUM LACTATE, POTASSIUM CHLORIDE, CALCIUM CHLORIDE 600; 310; 30; 20 MG/100ML; MG/100ML; MG/100ML; MG/100ML
INJECTION, SOLUTION INTRAVENOUS CONTINUOUS PRN
Status: DISCONTINUED | OUTPATIENT
Start: 2018-11-27 | End: 2018-11-27 | Stop reason: SURG

## 2018-11-27 RX ORDER — MELOXICAM 7.5 MG/1
15 TABLET ORAL ONCE
Status: COMPLETED | OUTPATIENT
Start: 2018-11-27 | End: 2018-11-27

## 2018-11-27 RX ORDER — FENTANYL CITRATE 50 UG/ML
50 INJECTION, SOLUTION INTRAMUSCULAR; INTRAVENOUS
Status: DISCONTINUED | OUTPATIENT
Start: 2018-11-27 | End: 2018-11-27 | Stop reason: HOSPADM

## 2018-11-27 RX ORDER — SCOLOPAMINE TRANSDERMAL SYSTEM 1 MG/1
1 PATCH, EXTENDED RELEASE TRANSDERMAL ONCE
Status: DISCONTINUED | OUTPATIENT
Start: 2018-11-27 | End: 2018-11-27

## 2018-11-27 RX ORDER — FENTANYL CITRATE 50 UG/ML
INJECTION, SOLUTION INTRAMUSCULAR; INTRAVENOUS AS NEEDED
Status: DISCONTINUED | OUTPATIENT
Start: 2018-11-27 | End: 2018-11-27 | Stop reason: SURG

## 2018-11-27 RX ORDER — OXYCODONE HYDROCHLORIDE 5 MG/1
5 TABLET ORAL EVERY 4 HOURS PRN
Status: DISCONTINUED | OUTPATIENT
Start: 2018-11-27 | End: 2018-11-28 | Stop reason: HOSPADM

## 2018-11-27 RX ORDER — NEOSTIGMINE METHYLSULFATE 1 MG/ML
INJECTION, SOLUTION INTRAVENOUS AS NEEDED
Status: DISCONTINUED | OUTPATIENT
Start: 2018-11-27 | End: 2018-11-27 | Stop reason: SURG

## 2018-11-27 RX ORDER — IPRATROPIUM BROMIDE AND ALBUTEROL SULFATE 2.5; .5 MG/3ML; MG/3ML
3 SOLUTION RESPIRATORY (INHALATION) ONCE AS NEEDED
Status: DISCONTINUED | OUTPATIENT
Start: 2018-11-27 | End: 2018-11-27 | Stop reason: HOSPADM

## 2018-11-27 RX ADMIN — LABETALOL HYDROCHLORIDE 2.5 MG: 5 INJECTION, SOLUTION INTRAVENOUS at 09:18

## 2018-11-27 RX ADMIN — CELECOXIB 200 MG: 200 CAPSULE ORAL at 17:37

## 2018-11-27 RX ADMIN — BUPIVACAINE HYDROCHLORIDE 60 ML: 2.5 INJECTION, SOLUTION EPIDURAL; INFILTRATION; INTRACAUDAL; PERINEURAL at 08:48

## 2018-11-27 RX ADMIN — FENTANYL CITRATE 25 MCG: 50 INJECTION, SOLUTION INTRAMUSCULAR; INTRAVENOUS at 09:21

## 2018-11-27 RX ADMIN — SODIUM CHLORIDE, POTASSIUM CHLORIDE, SODIUM LACTATE AND CALCIUM CHLORIDE 9 ML/HR: 600; 310; 30; 20 INJECTION, SOLUTION INTRAVENOUS at 06:31

## 2018-11-27 RX ADMIN — ONDANSETRON 4 MG: 2 INJECTION INTRAMUSCULAR; INTRAVENOUS at 12:13

## 2018-11-27 RX ADMIN — LIDOCAINE HYDROCHLORIDE 50 MG: 10 INJECTION, SOLUTION EPIDURAL; INFILTRATION; INTRACAUDAL; PERINEURAL at 08:45

## 2018-11-27 RX ADMIN — ESMOLOL HYDROCHLORIDE 20 MG: 10 INJECTION INTRAVENOUS at 08:45

## 2018-11-27 RX ADMIN — NEOSTIGMINE METHYLSULFATE 2 MG: 1 INJECTION, SOLUTION INTRAVENOUS at 12:13

## 2018-11-27 RX ADMIN — PROPOFOL 30 MG: 10 INJECTION, EMULSION INTRAVENOUS at 09:54

## 2018-11-27 RX ADMIN — SODIUM CHLORIDE, POTASSIUM CHLORIDE, SODIUM LACTATE AND CALCIUM CHLORIDE: 600; 310; 30; 20 INJECTION, SOLUTION INTRAVENOUS at 08:32

## 2018-11-27 RX ADMIN — MELOXICAM 15 MG: 7.5 TABLET ORAL at 07:23

## 2018-11-27 RX ADMIN — PROPOFOL: 10 INJECTION, EMULSION INTRAVENOUS at 11:14

## 2018-11-27 RX ADMIN — DEXAMETHASONE SODIUM PHOSPHATE 6 MG: 4 INJECTION, SOLUTION INTRAMUSCULAR; INTRAVENOUS at 08:50

## 2018-11-27 RX ADMIN — PREGABALIN 75 MG: 75 CAPSULE ORAL at 07:23

## 2018-11-27 RX ADMIN — SODIUM CHLORIDE, POTASSIUM CHLORIDE, SODIUM LACTATE AND CALCIUM CHLORIDE: 600; 310; 30; 20 INJECTION, SOLUTION INTRAVENOUS at 11:15

## 2018-11-27 RX ADMIN — ACETAMINOPHEN 1000 MG: 500 TABLET, FILM COATED ORAL at 16:00

## 2018-11-27 RX ADMIN — ACETAMINOPHEN 1000 MG: 500 TABLET, FILM COATED ORAL at 21:01

## 2018-11-27 RX ADMIN — TECHNETIUM TC 99M SULFUR COLLOID 1 DOSE: KIT at 08:40

## 2018-11-27 RX ADMIN — ACETAMINOPHEN 1000 MG: 500 TABLET ORAL at 07:23

## 2018-11-27 RX ADMIN — BUPRENORPHINE HYDROCHLORIDE 0.3 MG: 0.32 INJECTION INTRAMUSCULAR; INTRAVENOUS at 08:48

## 2018-11-27 RX ADMIN — PROPOFOL 200 MG: 10 INJECTION, EMULSION INTRAVENOUS at 08:45

## 2018-11-27 RX ADMIN — ATRACURIUM BESYLATE 50 MG: 10 INJECTION, SOLUTION INTRAVENOUS at 08:45

## 2018-11-27 RX ADMIN — CEFAZOLIN SODIUM 2 G: 2 INJECTION, SOLUTION INTRAVENOUS at 16:00

## 2018-11-27 RX ADMIN — PROPOFOL: 10 INJECTION, EMULSION INTRAVENOUS at 10:02

## 2018-11-27 RX ADMIN — GLYCOPYRROLATE 0.3 MG: 0.2 INJECTION, SOLUTION INTRAMUSCULAR; INTRAVENOUS at 12:13

## 2018-11-27 RX ADMIN — DIAZEPAM 2 MG: 2 TABLET ORAL at 15:07

## 2018-11-27 RX ADMIN — DEXAMETHASONE SODIUM PHOSPHATE 4 MG: 10 INJECTION, SOLUTION INTRAMUSCULAR; INTRAVENOUS at 08:48

## 2018-11-27 RX ADMIN — LIDOCAINE HYDROCHLORIDE 0.5 ML: 10 INJECTION, SOLUTION EPIDURAL; INFILTRATION; INTRACAUDAL; PERINEURAL at 06:31

## 2018-11-27 RX ADMIN — FENTANYL CITRATE 25 MCG: 50 INJECTION, SOLUTION INTRAMUSCULAR; INTRAVENOUS at 09:12

## 2018-11-27 RX ADMIN — CEFAZOLIN SODIUM 2 G: 2 INJECTION, SOLUTION INTRAVENOUS at 08:50

## 2018-11-27 RX ADMIN — PROPOFOL: 10 INJECTION, EMULSION INTRAVENOUS at 10:38

## 2018-11-27 RX ADMIN — PROPOFOL: 10 INJECTION, EMULSION INTRAVENOUS at 09:23

## 2018-11-27 RX ADMIN — FAMOTIDINE 20 MG: 20 TABLET ORAL at 07:23

## 2018-11-27 RX ADMIN — SCOPALAMINE 1 PATCH: 1 PATCH, EXTENDED RELEASE TRANSDERMAL at 07:24

## 2018-11-27 RX ADMIN — FENTANYL CITRATE 50 MCG: 50 INJECTION, SOLUTION INTRAMUSCULAR; INTRAVENOUS at 09:54

## 2018-11-27 RX ADMIN — PROPOFOL 150 MCG/KG/MIN: 10 INJECTION, EMULSION INTRAVENOUS at 08:41

## 2018-11-27 NOTE — ANESTHESIA POSTPROCEDURE EVALUATION
Patient: Ina Ram    Procedure Summary     Date:  11/27/18 Room / Location:   [facility]    Anesthesia Start:  0832 Anesthesia Stop:      Procedures:       BREAST MASTECTOMY BILATERAL  WITH LEFT SENTINEL NODE BIOPSY (Bilateral Breast)      BREAST RECONSTRUCTION, BREAST TISSUE EXPANDER INSERTION BILATERAL (Bilateral Breast) Diagnosis:  Malignant neoplasm of upper-outer quadrant of left female breast (CMS/HCC)    Surgeon:  Josie Chaudhary MD; Stephan Cowan MD Provider:  Dre Ball MD    Anesthesia Type:  general ASA Status:  2          Anesthesia Type: general  Last vitals  BP   146/81   Temp   98.1   Pulse   82   Resp 11    SpO2   100%     Post Anesthesia Care and Evaluation    Patient location during evaluation: PACU  Patient participation: complete - patient participated  Level of consciousness: awake and alert  Pain score: 0  Pain management: adequate  Airway patency: patent  Anesthetic complications: No anesthetic complications  PONV Status: none  Cardiovascular status: hemodynamically stable and acceptable  Respiratory status: nonlabored ventilation, acceptable and nasal cannula  Hydration status: acceptable

## 2018-11-27 NOTE — ANESTHESIA PROCEDURE NOTES
Peripheral Block      Patient location during procedure: OR  Reason for block: at surgeon's request and post-op pain management  Performed by  Anesthesiologist: Jayme Reed MD  Assisted by: Dre Ball MD  Preanesthetic Checklist  Completed: patient identified, site marked, surgical consent, pre-op evaluation, timeout performed, IV checked, risks and benefits discussed and monitors and equipment checked  Prep:  Sterile barriers:cap, gloves, gown and mask  Prep: ChloraPrep  Patient monitoring: blood pressure monitoring, continuous pulse oximetry and EKG  Procedure  Performed under: general  Guidance:ultrasound guided and landmark technique  Images:still images obtained    Laterality:Bilateral  Block Type:PECS I and PECS II  Injection Technique:single-shot  Needle Type:short-bevel  Needle Gauge:20 G    Medications Used: buprenorphine (BUPRENEX) injection, 0.3 mg  bupivacaine PF (MARCAINE) 0.25 % injection, 60 mL  dexamethasone sodium phosphate injection, 4 mg  Medications  Preservative Free Saline:10ml  Comment:Block Injection:  Total volume of LA divided between Right and Left sided blocks       Adjuncts:   Buprenorphine 0.30 mg ,Decadron 4 mg PSF    Post Assessment  Injection Assessment: negative aspiration for heme and incremental injection  Patient Tolerance:comfortable throughout block  Complications:no  Additional Notes  The pt. Was placed in the Supine Position and GA was induced     The insertion site was prepped with CHG and Ultrasound guidance with In-Plane techniquewas  a 4inch BBraun 360 degree echogenic needle was visualized.  Normal Saline PSF was  utilized for hydrodissection of tissue. PECS 1 Block- Pectoralis Major and Minor where identified and LA was injected between PMM and PmM at the level of the 3rd Rib(10ml),  PECS 2-  Pectoralis Minor and Serratus muscle where identified and the needle was advanced laterally in-plane with the 4th rib as a backstop, pleura was monitored.  LA was  injected between SA and PmM at the level of 4th rib( 20ml).  LA injection spread was visualized, injection was incremental 1-5ml, normal or low injection pressure, no intravascular injection, no pneumothorax appreciated.  Thank You.

## 2018-11-28 VITALS
RESPIRATION RATE: 16 BRPM | WEIGHT: 171 LBS | OXYGEN SATURATION: 97 % | TEMPERATURE: 98.1 F | HEART RATE: 59 BPM | SYSTOLIC BLOOD PRESSURE: 101 MMHG | HEIGHT: 69 IN | BODY MASS INDEX: 25.33 KG/M2 | DIASTOLIC BLOOD PRESSURE: 59 MMHG

## 2018-11-28 PROCEDURE — 25010000003 CEFAZOLIN IN DEXTROSE 2-4 GM/100ML-% SOLUTION: Performed by: SURGERY

## 2018-11-28 RX ORDER — OXYCODONE HYDROCHLORIDE 5 MG/1
5 TABLET ORAL EVERY 4 HOURS PRN
Qty: 14 TABLET | Refills: 0 | Status: SHIPPED | OUTPATIENT
Start: 2018-11-28 | End: 2018-12-07

## 2018-11-28 RX ADMIN — CEFAZOLIN SODIUM 2 G: 2 INJECTION, SOLUTION INTRAVENOUS at 01:22

## 2018-11-28 RX ADMIN — CELECOXIB 200 MG: 200 CAPSULE ORAL at 05:49

## 2018-11-28 RX ADMIN — ACETAMINOPHEN 1000 MG: 500 TABLET, FILM COATED ORAL at 10:53

## 2018-11-28 RX ADMIN — CEFAZOLIN SODIUM 2 G: 2 INJECTION, SOLUTION INTRAVENOUS at 08:11

## 2018-11-28 RX ADMIN — ACETAMINOPHEN 1000 MG: 500 TABLET, FILM COATED ORAL at 04:07

## 2018-11-28 RX ADMIN — OXYCODONE HYDROCHLORIDE 5 MG: 5 TABLET ORAL at 10:53

## 2018-11-29 LAB
CYTO UR: NORMAL
LAB AP CASE REPORT: NORMAL
LAB AP CLINICAL INFORMATION: NORMAL
LAB AP DIAGNOSIS COMMENT: NORMAL
LAB AP SPECIAL STAINS: NORMAL
Lab: NORMAL
PATH REPORT.FINAL DX SPEC: NORMAL
PATH REPORT.GROSS SPEC: NORMAL

## 2019-01-08 ENCOUNTER — OFFICE VISIT (OUTPATIENT)
Dept: ONCOLOGY | Facility: CLINIC | Age: 39
End: 2019-01-08

## 2019-01-08 ENCOUNTER — RESEARCH ENCOUNTER (OUTPATIENT)
Dept: ONCOLOGY | Facility: CLINIC | Age: 39
End: 2019-01-08

## 2019-01-08 VITALS
OXYGEN SATURATION: 100 % | HEIGHT: 69 IN | TEMPERATURE: 97.6 F | WEIGHT: 167 LBS | HEART RATE: 74 BPM | RESPIRATION RATE: 14 BRPM | SYSTOLIC BLOOD PRESSURE: 116 MMHG | DIASTOLIC BLOOD PRESSURE: 73 MMHG | BODY MASS INDEX: 24.73 KG/M2

## 2019-01-08 DIAGNOSIS — C50.412 MALIGNANT NEOPLASM OF UPPER-OUTER QUADRANT OF LEFT BREAST IN FEMALE, ESTROGEN RECEPTOR POSITIVE (HCC): Primary | ICD-10-CM

## 2019-01-08 DIAGNOSIS — Z17.0 MALIGNANT NEOPLASM OF UPPER-OUTER QUADRANT OF LEFT BREAST IN FEMALE, ESTROGEN RECEPTOR POSITIVE (HCC): Primary | ICD-10-CM

## 2019-01-08 PROBLEM — C50.812 MALIGNANT NEOPLASM OF OVERLAPPING SITES OF LEFT FEMALE BREAST (HCC): Status: RESOLVED | Noted: 2018-11-27 | Resolved: 2019-01-08

## 2019-01-08 PROCEDURE — 99214 OFFICE O/P EST MOD 30 MIN: CPT | Performed by: INTERNAL MEDICINE

## 2019-01-08 NOTE — RESEARCH
At the request of Dr. Malloy, I met with Ms Ram today to discuss participation in the  clinical trial.  I reviewed the consent with the patient and went through all eight elements of the ICF.  She is interested in the study but wishes to think about it a little longer and then come by next week to sign the consent and begin screening assessments.  I will touch base with her tomorrow once I have reviewed her eligibility and the study screening requirements.  She did not have any more questions for me today.  I will follow up tomorrow.     Maryann Xiao  Research RN

## 2019-01-08 NOTE — PROGRESS NOTES
"      PROBLEM LIST:  1. [id number] ER weakly positive (1-3%), LA negative, Her2 negative invasive ductal carcinoma of the left breast  A) presented with a palpable breast mass.  Biopsy showed ER+, LA- Her2- IDC, grade 3.  B) neoadjuvant TAC started July 2018.  Complicated by neutropenic fever after cycle 4.  Dose reduced to 80% for cycles 5 and 6.  C) bilateral mastectomy on 11/27/18.  Pathology showed a 1.7 cm high grade IDC with superficial invasion into the skeletal muscle.  0/2 SLN involved.    Subjective     HISTORY OF PRESENT ILLNESS:   Ina Ram returns for follow-up.  She underwent bilateral mastectomy at the end of November.  She had 1.7 cm of residual carcinoma with superficial invasion into the skeletal muscle.  All margins were negative and the lymph nodes were uninvolved.  She is here today to discuss additional therapy options.    Past Medical History, Past Surgical History, Social History, Family History have been reviewed and are without significant changes except as mentioned.    Review of Systems   A comprehensive 14 point review of systems was performed and was negative except as mentioned.    Medications:  The current medication list was reviewed in the EMR    ALLERGIES:  No Known Allergies    Objective      /73   Pulse 74   Temp 97.6 °F (36.4 °C)   Resp 14   Ht 175.3 cm (69\")   Wt 75.8 kg (167 lb)   SpO2 100%   BMI 24.66 kg/m²      Performance Status: 0    General: well appearing female in no acute distress  Neuro: alert and oriented  HEENT: sclera anicteric, oropharynx clear  Lymphatics: no cervical, supraclavicular, or axillary adenopathy  Cardiovascular: regular rate and rhythm, no murmurs  Lungs: clear to auscultation bilaterally  Abdomen: soft, nontender, nondistended.  No palpable organomegaly  Extremeties: no lower extremity edema  Skin: no rashes, lesions, bruising, or petechiae  Psych: mood and affect appropriate          Assessment/Plan   Ina Ram is a 38 y.o. " year old female with a stage II B ER weakly positive HER-2 negative breast cancer who returns for follow up after bilateral mastectomy.  She did have a response to neoadjuvant chemotherapy but still had over a centimeter of residual disease at the time of her surgery.  We discussed that residual disease does indicate a higher risk of relapse.  We discussed options for additional therapy including Xeloda for 6 months, which did show benefit in the create-x trial.   We reviewed the potential side effects of capecitabine including myelosuppression, nausea, diarrhea, hand-foot syndrome, coronary vasospasm, and mucositis.     we also discussed enrollment in a clinical trial of pembrolizumab versus observation.  She would have the potential of being randomized to no treatment as part of that study.  We reviewed some of the side effects with pemborlizumab including autoimmune toxicities such as pneumonitis, hepatitis, endocrinopathy, skin rash, nephritis, and colitis.    We also discussed the option of observation only  in which case I would see her every 3 months with labs.     she met with the research coordinator today and we will review some information about the S 1418 clinical trial.       she had a indeterminate pelvic lymph node that showed up on her pretreatment PET scan.  We will repeat a PET scan to follow up on this, though I suspect it is benign.    She will follow-up as needed depending on her decision about treatment.              I spent 25 minutes with the patient. I spent > 50% percent of this time counseling and discussing prognosis, diagnostic testing, evaluation, current status and management.      Fifi Malloy MD  [facility] Hematology and Oncology    1/8/2019          CC:

## 2019-01-09 ENCOUNTER — TELEPHONE (OUTPATIENT)
Dept: ONCOLOGY | Facility: CLINIC | Age: 39
End: 2019-01-09

## 2019-01-09 NOTE — RESEARCH
As requested by the patient, I called her today to follow up regarding required items which she would need if she was interested in the study.  She did not answer and so I left her a VM to call me back.      Maryann Flower RN

## 2019-01-10 ENCOUNTER — TELEPHONE (OUTPATIENT)
Dept: ONCOLOGY | Facility: CLINIC | Age: 39
End: 2019-01-10

## 2019-01-10 NOTE — RESEARCH
Patient returned my call and she is planning to come in next Monday 1/14 to sign the consent for the trial.  I discussed with her that we would need to change her infusion date from what I previously thought as the study may take up to 10 business days to get her PD-L1 back.  She is ok with this.  I will talk to Ruddy from scheduling and see what I can do.     Maryann Flower RN

## 2019-01-14 ENCOUNTER — HOSPITAL ENCOUNTER (OUTPATIENT)
Dept: PET IMAGING | Facility: HOSPITAL | Age: 39
Discharge: HOME | End: 2019-01-14
Attending: INTERNAL MEDICINE | Admitting: INTERNAL MEDICINE

## 2019-01-14 ENCOUNTER — HOSPITAL ENCOUNTER (OUTPATIENT)
Dept: PET IMAGING | Facility: HOSPITAL | Age: 39
Discharge: HOME | End: 2019-01-14
Attending: INTERNAL MEDICINE

## 2019-01-14 ENCOUNTER — RESEARCH ENCOUNTER (OUTPATIENT)
Dept: ONCOLOGY | Facility: CLINIC | Age: 39
End: 2019-01-14

## 2019-01-14 DIAGNOSIS — Z17.0 MALIGNANT NEOPLASM OF UPPER-OUTER QUADRANT OF LEFT BREAST IN FEMALE, ESTROGEN RECEPTOR POSITIVE (HCC): ICD-10-CM

## 2019-01-14 DIAGNOSIS — C50.412 MALIGNANT NEOPLASM OF UPPER-OUTER QUADRANT OF LEFT BREAST IN FEMALE, ESTROGEN RECEPTOR POSITIVE (HCC): ICD-10-CM

## 2019-01-14 LAB — GLUCOSE BLDC GLUCOMTR-MCNC: 86 MG/DL (ref 70–130)

## 2019-01-14 PROCEDURE — 82962 GLUCOSE BLOOD TEST: CPT

## 2019-01-14 PROCEDURE — A9552 F18 FDG: HCPCS | Performed by: INTERNAL MEDICINE

## 2019-01-14 PROCEDURE — 0 FLUDEOXYGLUCOSE F18 SOLUTION: Performed by: INTERNAL MEDICINE

## 2019-01-14 PROCEDURE — 78815 PET IMAGE W/CT SKULL-THIGH: CPT

## 2019-01-14 RX ADMIN — FLUDEOXYGLUCOSE F18 1 DOSE: 300 INJECTION INTRAVENOUS at 12:06

## 2019-01-15 NOTE — RESEARCH
At the request of Dr. Malloy, I met with Ms. Ram today to discuss participation in the  clinical trial. The patient was initially approached on 1/8/19 by Maryann Xiao, Research RN. Today, at the request of the patient, I met with her to review the trial in detail and answer all research related questions. At this time I reviewed all elements of the ICF. The patient expressed high interest in participating and verbalized she did not have further questions at this time. The patient signed the main ICF as well as the additional studies section. A copy of the ICF was given to the patient and I provided my contact information as well as Maryann Xiao’s (primary coordinator). I explained to Ms. Ram we will now request her archival tissue from pathology and submit for testing upon final eligibility review by Dr. Malloy. Once report is received we will notify her regarding the next step. Ms. Ram verbalized understanding.

## 2019-01-17 ENCOUNTER — TELEPHONE (OUTPATIENT)
Dept: ONCOLOGY | Facility: CLINIC | Age: 39
End: 2019-01-17

## 2019-01-17 ENCOUNTER — DOCUMENTATION (OUTPATIENT)
Dept: ONCOLOGY | Facility: CLINIC | Age: 39
End: 2019-01-17

## 2019-01-17 NOTE — PROGRESS NOTES
1/16/2019  ~patrick Pate  PATIENT IS NEEDING A MELVIN FOR DENTAL WORK     FAX THIS LETTER TO :  [phone]    1/17/2019  Typed letter & put in MD box.    ~per Lila  Pt called stating that she is on her way to dentist now.    Went and had SHIVA Shea sign letter.  Faxed to number above.

## 2019-01-17 NOTE — TELEPHONE ENCOUNTER
Called patient to let her know PET results - no abnormal activity in pelvis.  However, there are a few hypermetabolic areas around the left tissue expander.  This could be post-surgical inflammation, but we want to be sure she doesn't have residual cancer.  Discussed with Dr. PHILIPPE - he will discuss possibility of additional imaging with radiology.  We will let Ina know plans for the next step.

## 2019-01-18 ENCOUNTER — TRANSCRIBE ORDERS (OUTPATIENT)
Dept: MAMMOGRAPHY | Facility: HOSPITAL | Age: 39
End: 2019-01-18

## 2019-01-18 ENCOUNTER — DOCUMENTATION (OUTPATIENT)
Dept: OTHER | Facility: HOSPITAL | Age: 39
End: 2019-01-18

## 2019-01-18 DIAGNOSIS — C50.812 MALIGNANT NEOPLASM OF OVERLAPPING SITES OF LEFT FEMALE BREAST, UNSPECIFIED ESTROGEN RECEPTOR STATUS (HCC): ICD-10-CM

## 2019-01-18 DIAGNOSIS — R22.32 AXILLARY MASS, LEFT: Primary | ICD-10-CM

## 2019-01-18 NOTE — PROGRESS NOTES
Patients sister called to ask about plan for area found on PET scan - Dr. PHILIPPE and Dr Malloy have discussed and Dr PHILIPPE has spoken to Dr Marino - a left breast US and biopsy will be scheduled - I spoke to Inocencio in Mammography department who said she is working on getting this scheduled now - she will call the patient before the end of the day today

## 2019-01-28 ENCOUNTER — DOCUMENTATION (OUTPATIENT)
Dept: OTHER | Facility: HOSPITAL | Age: 39
End: 2019-01-28

## 2019-01-28 NOTE — PROGRESS NOTES
Patients sister, Annemarie, called to ask where we go from here - the patient had a PET scan which revealed a hypermetabolic area in the left breast area - recommendation was for US biopsy - However when the patient saw Dr Cowan he said that the biopsy would be too risky to do. Dr Cowan is going to talk to Dr Malloy. I have contacted Dr. Malloy's nurse Ayde who will check with Dr Malloy tomorrow and let me know the plan of care going forward - I have updated the patients family

## 2019-01-29 ENCOUNTER — DOCUMENTATION (OUTPATIENT)
Dept: OTHER | Facility: HOSPITAL | Age: 39
End: 2019-01-29

## 2019-01-29 NOTE — PROGRESS NOTES
I spoke to Giovanna in Mammography - Dr Cowan spoke to Dr Marino 1/25/19. The consensus was to move biopsy to 2/18/19 for maximum tissue expansion should biopsy cause issue with expander. I  Have notified the patients family with this update.

## 2019-02-04 ENCOUNTER — DOCUMENTATION (OUTPATIENT)
Dept: ONCOLOGY | Facility: CLINIC | Age: 39
End: 2019-02-04

## 2019-02-04 NOTE — PROGRESS NOTES
I called patients sister to let her know that Dr PHILIPPE has called in antibiotic and the patient will see Dr PHILIPPE 2/6/19 @ 2:00 and Dr. Cowan @ 12:00.

## 2019-02-04 NOTE — PROGRESS NOTES
Patients sister called to say that patient had red raised area on lateral side of patients left breast. I called to let Dr PHILIPPE know and he is going to see what needs to be done.

## 2019-02-08 ENCOUNTER — TELEPHONE (OUTPATIENT)
Dept: ONCOLOGY | Facility: CLINIC | Age: 39
End: 2019-02-08

## 2019-02-08 NOTE — TELEPHONE ENCOUNTER
Sister-in-law calling inquiring about surgery schedule. Also states breast mass has noticeably changed since seen by Dr. PHILIPPE. Notified Dr. PHILIPPE's assistant who is going to reach out to patient and sister-in-law.

## 2019-02-11 ENCOUNTER — APPOINTMENT (OUTPATIENT)
Dept: ULTRASOUND IMAGING | Facility: HOSPITAL | Age: 39
End: 2019-02-11
Attending: SURGERY

## 2019-02-18 ENCOUNTER — APPOINTMENT (OUTPATIENT)
Dept: PREADMISSION TESTING | Facility: HOSPITAL | Age: 39
End: 2019-02-18

## 2019-02-18 ENCOUNTER — APPOINTMENT (OUTPATIENT)
Dept: ULTRASOUND IMAGING | Facility: HOSPITAL | Age: 39
End: 2019-02-18
Attending: SURGERY

## 2019-02-18 ENCOUNTER — ANESTHESIA EVENT (OUTPATIENT)
Dept: PERIOP | Facility: HOSPITAL | Age: 39
End: 2019-02-18

## 2019-02-18 VITALS — HEIGHT: 69 IN | BODY MASS INDEX: 25.57 KG/M2 | WEIGHT: 172.62 LBS

## 2019-02-18 LAB
ANION GAP SERPL CALCULATED.3IONS-SCNC: 6 MMOL/L (ref 3–11)
BUN BLD-MCNC: 11 MG/DL (ref 9–23)
BUN/CREAT SERPL: 15.1 (ref 7–25)
CALCIUM SPEC-SCNC: 9.2 MG/DL (ref 8.7–10.4)
CHLORIDE SERPL-SCNC: 106 MMOL/L (ref 99–109)
CO2 SERPL-SCNC: 29 MMOL/L (ref 20–31)
CREAT BLD-MCNC: 0.73 MG/DL (ref 0.6–1.3)
DEPRECATED RDW RBC AUTO: 42.3 FL (ref 37–54)
ERYTHROCYTE [DISTWIDTH] IN BLOOD BY AUTOMATED COUNT: 13.5 % (ref 11.3–14.5)
GFR SERPL CREATININE-BSD FRML MDRD: 89 ML/MIN/1.73
GLUCOSE BLD-MCNC: 105 MG/DL (ref 70–100)
HCT VFR BLD AUTO: 36.7 % (ref 34.5–44)
HGB BLD-MCNC: 11.8 G/DL (ref 11.5–15.5)
MCH RBC QN AUTO: 27.9 PG (ref 27–31)
MCHC RBC AUTO-ENTMCNC: 32.2 G/DL (ref 32–36)
MCV RBC AUTO: 86.8 FL (ref 80–99)
PLATELET # BLD AUTO: 203 10*3/MM3 (ref 150–450)
PMV BLD AUTO: 9.9 FL (ref 6–12)
POTASSIUM BLD-SCNC: 3.6 MMOL/L (ref 3.5–5.5)
RBC # BLD AUTO: 4.23 10*6/MM3 (ref 3.89–5.14)
SODIUM BLD-SCNC: 141 MMOL/L (ref 132–146)
WBC NRBC COR # BLD: 4.57 10*3/MM3 (ref 3.5–10.8)

## 2019-02-18 PROCEDURE — 85027 COMPLETE CBC AUTOMATED: CPT | Performed by: SURGERY

## 2019-02-18 PROCEDURE — 36415 COLL VENOUS BLD VENIPUNCTURE: CPT

## 2019-02-18 PROCEDURE — 80048 BASIC METABOLIC PNL TOTAL CA: CPT | Performed by: SURGERY

## 2019-02-18 RX ORDER — FAMOTIDINE 10 MG/ML
20 INJECTION, SOLUTION INTRAVENOUS ONCE
Status: CANCELLED | OUTPATIENT
Start: 2019-02-18 | End: 2019-02-18

## 2019-02-19 ENCOUNTER — ANESTHESIA (OUTPATIENT)
Dept: PERIOP | Facility: HOSPITAL | Age: 39
End: 2019-02-19

## 2019-02-19 ENCOUNTER — HOSPITAL ENCOUNTER (OUTPATIENT)
Facility: HOSPITAL | Age: 39
Setting detail: SURGERY ADMIT
Discharge: HOME | End: 2019-02-19
Attending: SURGERY | Admitting: PLASTIC SURGERY

## 2019-02-19 VITALS
RESPIRATION RATE: 20 BRPM | TEMPERATURE: 98.2 F | HEART RATE: 75 BPM | SYSTOLIC BLOOD PRESSURE: 132 MMHG | BODY MASS INDEX: 25.48 KG/M2 | DIASTOLIC BLOOD PRESSURE: 73 MMHG | WEIGHT: 172 LBS | HEIGHT: 69 IN | OXYGEN SATURATION: 95 %

## 2019-02-19 DIAGNOSIS — C50.919 BREAST CANCER (HCC): ICD-10-CM

## 2019-02-19 PROCEDURE — 25010000002 HYDROMORPHONE PER 4 MG: Performed by: NURSE ANESTHETIST, CERTIFIED REGISTERED

## 2019-02-19 PROCEDURE — C1789 PROSTHESIS, BREAST, IMP: HCPCS | Performed by: SURGERY

## 2019-02-19 PROCEDURE — 25010000002 ONDANSETRON PER 1 MG: Performed by: NURSE ANESTHETIST, CERTIFIED REGISTERED

## 2019-02-19 PROCEDURE — 88305 TISSUE EXAM BY PATHOLOGIST: CPT | Performed by: SURGERY

## 2019-02-19 PROCEDURE — 25010000002 NEOSTIGMINE 10 MG/10ML SOLUTION: Performed by: NURSE ANESTHETIST, CERTIFIED REGISTERED

## 2019-02-19 PROCEDURE — 81025 URINE PREGNANCY TEST: CPT | Performed by: ANESTHESIOLOGY

## 2019-02-19 PROCEDURE — 25010000002 DEXAMETHASONE PER 1 MG: Performed by: NURSE ANESTHETIST, CERTIFIED REGISTERED

## 2019-02-19 PROCEDURE — 25010000003 CEFAZOLIN IN DEXTROSE 2-4 GM/100ML-% SOLUTION: Performed by: PLASTIC SURGERY

## 2019-02-19 PROCEDURE — 25010000002 FENTANYL CITRATE (PF) 100 MCG/2ML SOLUTION: Performed by: NURSE ANESTHETIST, CERTIFIED REGISTERED

## 2019-02-19 PROCEDURE — 25010000002 PROPOFOL 10 MG/ML EMULSION: Performed by: NURSE ANESTHETIST, CERTIFIED REGISTERED

## 2019-02-19 DEVICE — BRST SIL NATRELLE INSPIRA SMOTH XF/P 700CC: Type: IMPLANTABLE DEVICE | Site: BREAST | Status: FUNCTIONAL

## 2019-02-19 RX ORDER — MAGNESIUM HYDROXIDE 1200 MG/15ML
LIQUID ORAL AS NEEDED
Status: DISCONTINUED | OUTPATIENT
Start: 2019-02-19 | End: 2019-02-19 | Stop reason: HOSPADM

## 2019-02-19 RX ORDER — ROCURONIUM BROMIDE 10 MG/ML
INJECTION, SOLUTION INTRAVENOUS AS NEEDED
Status: DISCONTINUED | OUTPATIENT
Start: 2019-02-19 | End: 2019-02-19 | Stop reason: SURG

## 2019-02-19 RX ORDER — BUPIVACAINE HYDROCHLORIDE 5 MG/ML
INJECTION, SOLUTION EPIDURAL; INTRACAUDAL AS NEEDED
Status: DISCONTINUED | OUTPATIENT
Start: 2019-02-19 | End: 2019-02-19 | Stop reason: HOSPADM

## 2019-02-19 RX ORDER — CEPHALEXIN 500 MG/1
500 CAPSULE ORAL 3 TIMES DAILY
Qty: 15 CAPSULE | Refills: 0 | Status: SHIPPED | OUTPATIENT
Start: 2019-02-19 | End: 2019-02-24

## 2019-02-19 RX ORDER — ONDANSETRON 2 MG/ML
INJECTION INTRAMUSCULAR; INTRAVENOUS AS NEEDED
Status: DISCONTINUED | OUTPATIENT
Start: 2019-02-19 | End: 2019-02-19 | Stop reason: SURG

## 2019-02-19 RX ORDER — SODIUM CHLORIDE 0.9 % (FLUSH) 0.9 %
3 SYRINGE (ML) INJECTION EVERY 12 HOURS SCHEDULED
Status: DISCONTINUED | OUTPATIENT
Start: 2019-02-19 | End: 2019-02-19 | Stop reason: HOSPADM

## 2019-02-19 RX ORDER — FENTANYL CITRATE 50 UG/ML
INJECTION, SOLUTION INTRAMUSCULAR; INTRAVENOUS AS NEEDED
Status: DISCONTINUED | OUTPATIENT
Start: 2019-02-19 | End: 2019-02-19 | Stop reason: SURG

## 2019-02-19 RX ORDER — HYDROMORPHONE HYDROCHLORIDE 1 MG/ML
0.5 INJECTION, SOLUTION INTRAMUSCULAR; INTRAVENOUS; SUBCUTANEOUS
Status: DISCONTINUED | OUTPATIENT
Start: 2019-02-19 | End: 2019-02-19 | Stop reason: HOSPADM

## 2019-02-19 RX ORDER — OXYCODONE HYDROCHLORIDE AND ACETAMINOPHEN 5; 325 MG/1; MG/1
1 TABLET ORAL ONCE
Status: COMPLETED | OUTPATIENT
Start: 2019-02-19 | End: 2019-02-19

## 2019-02-19 RX ORDER — GLYCOPYRROLATE 0.2 MG/ML
INJECTION INTRAMUSCULAR; INTRAVENOUS AS NEEDED
Status: DISCONTINUED | OUTPATIENT
Start: 2019-02-19 | End: 2019-02-19 | Stop reason: SURG

## 2019-02-19 RX ORDER — SODIUM CHLORIDE 0.9 % (FLUSH) 0.9 %
3-10 SYRINGE (ML) INJECTION AS NEEDED
Status: DISCONTINUED | OUTPATIENT
Start: 2019-02-19 | End: 2019-02-19 | Stop reason: HOSPADM

## 2019-02-19 RX ORDER — DEXAMETHASONE SODIUM PHOSPHATE 4 MG/ML
INJECTION, SOLUTION INTRA-ARTICULAR; INTRALESIONAL; INTRAMUSCULAR; INTRAVENOUS; SOFT TISSUE AS NEEDED
Status: DISCONTINUED | OUTPATIENT
Start: 2019-02-19 | End: 2019-02-19 | Stop reason: SURG

## 2019-02-19 RX ORDER — LIDOCAINE HYDROCHLORIDE 10 MG/ML
INJECTION, SOLUTION EPIDURAL; INFILTRATION; INTRACAUDAL; PERINEURAL AS NEEDED
Status: DISCONTINUED | OUTPATIENT
Start: 2019-02-19 | End: 2019-02-19 | Stop reason: SURG

## 2019-02-19 RX ORDER — NEOSTIGMINE METHYLSULFATE 1 MG/ML
INJECTION, SOLUTION INTRAVENOUS AS NEEDED
Status: DISCONTINUED | OUTPATIENT
Start: 2019-02-19 | End: 2019-02-19 | Stop reason: SURG

## 2019-02-19 RX ORDER — PROPOFOL 10 MG/ML
VIAL (ML) INTRAVENOUS AS NEEDED
Status: DISCONTINUED | OUTPATIENT
Start: 2019-02-19 | End: 2019-02-19 | Stop reason: SURG

## 2019-02-19 RX ORDER — ONDANSETRON 2 MG/ML
4 INJECTION INTRAMUSCULAR; INTRAVENOUS ONCE AS NEEDED
Status: COMPLETED | OUTPATIENT
Start: 2019-02-19 | End: 2019-02-19

## 2019-02-19 RX ORDER — FENTANYL CITRATE 50 UG/ML
50 INJECTION, SOLUTION INTRAMUSCULAR; INTRAVENOUS
Status: DISCONTINUED | OUTPATIENT
Start: 2019-02-19 | End: 2019-02-19 | Stop reason: HOSPADM

## 2019-02-19 RX ORDER — LIDOCAINE HYDROCHLORIDE 10 MG/ML
0.5 INJECTION, SOLUTION EPIDURAL; INFILTRATION; INTRACAUDAL; PERINEURAL ONCE AS NEEDED
Status: COMPLETED | OUTPATIENT
Start: 2019-02-19 | End: 2019-02-19

## 2019-02-19 RX ORDER — FAMOTIDINE 20 MG/1
20 TABLET, FILM COATED ORAL ONCE
Status: COMPLETED | OUTPATIENT
Start: 2019-02-19 | End: 2019-02-19

## 2019-02-19 RX ORDER — SODIUM CHLORIDE, SODIUM LACTATE, POTASSIUM CHLORIDE, CALCIUM CHLORIDE 600; 310; 30; 20 MG/100ML; MG/100ML; MG/100ML; MG/100ML
9 INJECTION, SOLUTION INTRAVENOUS CONTINUOUS
Status: DISCONTINUED | OUTPATIENT
Start: 2019-02-19 | End: 2019-02-19 | Stop reason: HOSPADM

## 2019-02-19 RX ORDER — CEFAZOLIN SODIUM 2 G/100ML
2 INJECTION, SOLUTION INTRAVENOUS ONCE
Status: COMPLETED | OUTPATIENT
Start: 2019-02-19 | End: 2019-02-19

## 2019-02-19 RX ORDER — OXYCODONE HYDROCHLORIDE AND ACETAMINOPHEN 5; 325 MG/1; MG/1
1-2 TABLET ORAL EVERY 4 HOURS PRN
Qty: 20 TABLET | Refills: 0 | Status: SHIPPED | OUTPATIENT
Start: 2019-02-19 | End: 2019-03-19

## 2019-02-19 RX ADMIN — ROCURONIUM BROMIDE 50 MG: 10 SOLUTION INTRAVENOUS at 10:28

## 2019-02-19 RX ADMIN — HYDROMORPHONE HYDROCHLORIDE 0.5 MG: 1 INJECTION, SOLUTION INTRAMUSCULAR; INTRAVENOUS; SUBCUTANEOUS at 13:00

## 2019-02-19 RX ADMIN — FENTANYL CITRATE 50 MCG: 50 INJECTION, SOLUTION INTRAMUSCULAR; INTRAVENOUS at 10:28

## 2019-02-19 RX ADMIN — GLYCOPYRROLATE 0.6 MG: 0.2 INJECTION, SOLUTION INTRAMUSCULAR; INTRAVENOUS at 12:28

## 2019-02-19 RX ADMIN — NEOSTIGMINE METHYLSULFATE 4 MG: 1 INJECTION, SOLUTION INTRAVENOUS at 12:28

## 2019-02-19 RX ADMIN — LIDOCAINE HYDROCHLORIDE 100 MG: 10 INJECTION, SOLUTION EPIDURAL; INFILTRATION; INTRACAUDAL; PERINEURAL at 10:28

## 2019-02-19 RX ADMIN — FAMOTIDINE 20 MG: 20 TABLET, FILM COATED ORAL at 09:19

## 2019-02-19 RX ADMIN — LIDOCAINE HYDROCHLORIDE 0.2 ML: 10 INJECTION, SOLUTION EPIDURAL; INFILTRATION; INTRACAUDAL; PERINEURAL at 09:05

## 2019-02-19 RX ADMIN — ONDANSETRON 4 MG: 2 INJECTION INTRAMUSCULAR; INTRAVENOUS at 12:23

## 2019-02-19 RX ADMIN — SODIUM CHLORIDE, POTASSIUM CHLORIDE, SODIUM LACTATE AND CALCIUM CHLORIDE 9 ML/HR: 600; 310; 30; 20 INJECTION, SOLUTION INTRAVENOUS at 09:05

## 2019-02-19 RX ADMIN — OXYCODONE HYDROCHLORIDE AND ACETAMINOPHEN 1 TABLET: 5; 325 TABLET ORAL at 13:50

## 2019-02-19 RX ADMIN — DEXAMETHASONE SODIUM PHOSPHATE 8 MG: 4 INJECTION, SOLUTION INTRAMUSCULAR; INTRAVENOUS at 10:35

## 2019-02-19 RX ADMIN — SODIUM CHLORIDE, POTASSIUM CHLORIDE, SODIUM LACTATE AND CALCIUM CHLORIDE: 600; 310; 30; 20 INJECTION, SOLUTION INTRAVENOUS at 12:30

## 2019-02-19 RX ADMIN — ONDANSETRON 4 MG: 2 INJECTION INTRAMUSCULAR; INTRAVENOUS at 13:45

## 2019-02-19 RX ADMIN — FENTANYL CITRATE 50 MCG: 50 INJECTION, SOLUTION INTRAMUSCULAR; INTRAVENOUS at 11:24

## 2019-02-19 RX ADMIN — FENTANYL CITRATE 50 MCG: 50 INJECTION, SOLUTION INTRAMUSCULAR; INTRAVENOUS at 10:30

## 2019-02-19 RX ADMIN — PROPOFOL 250 MG: 10 INJECTION, EMULSION INTRAVENOUS at 10:28

## 2019-02-19 RX ADMIN — HYDROMORPHONE HYDROCHLORIDE 0.5 MG: 1 INJECTION, SOLUTION INTRAMUSCULAR; INTRAVENOUS; SUBCUTANEOUS at 13:15

## 2019-02-19 RX ADMIN — CEFAZOLIN SODIUM 2 G: 2 INJECTION, SOLUTION INTRAVENOUS at 10:22

## 2019-02-19 NOTE — BRIEF OP NOTE
BREAST RECONSTRUCTION, BREAST TISSUE EXPANDER REMOVAL, IMPLANT INSERTION  Progress Note    Ina Ram  2/19/2019    Pre-op Diagnosis:   Bilateral breast tissue expanders in place after mastectomies.        Post-Op Diagnosis Codes:     * Left breast mass [N63.20]    Procedure/CPT® Codes:      Procedure(s):  EXCISIONAL BIOPSY OF LEFT BREAST MASS  BILATERAL RECONSTRUCTED BREAST TISSUE EXPANDER EXCHANGE FOR PERM IMPLANT    Surgeon(s):  Josie Chaudhary MD Lynch, Michael Paul, MD    Anesthesia: General    Staff:   Circulator: Paulino Martinez RN; Kimber Wilkerson, COLETTE; Aly Gomez RN  Scrub Person: Prabhu Shah; Milli Sneed  Nursing Assistant: Avani Mccormick  Assistant: Domingo Choudhury PA    Estimated Blood Loss: minimal    Urine Voided: * No values recorded between 2/19/2019 10:22 AM and 2/19/2019 12:44 PM *    Specimens:                ID Type Source Tests Collected by Time   A : left breast biopsy, short stitch lateral, long stitch superior Tissue Breast, Left TISSUE PATHOLOGY EXAM Josie Chaudhary MD 2/19/2019 1155         Drains:   Closed/Suction Drain 1 Right Breast 15 Fr. (Active)       Closed/Suction Drain 1 Left Breast 15 Fr. (Active)       Closed/Suction Drain 2 Right Breast Bulb 15 Fr. (Active)       Closed/Suction Drain 2 Left Breast Bulb 15 Fr. (Active)   none    Findings: Allergan  cc implants bilaterally.     Complications: none immediate      Stephan Cowan MD     Date: 2/19/2019  Time: 12:59 PM

## 2019-02-19 NOTE — OP NOTE
DATE OF PROCEDURE: 02/19/19    PREOPERATIVE DIAGNOSIS: Bilateral absent breasts with tissue expanders in place, status post mastectomies.     POSTOPERATIVE DIAGNOSIS: Bilateral absent breasts with tissue expanders in place, status post mastectomies.     PROCEDURES PERFORMED:  1. Removal of bilateral breast tissue expanders and placement of permanent silicone breast implants.   2. Bilateral open periprosthetic capsulotomy.    SURGEON: Stephan Cowan MD    ASSISTANT: Zheng Choudhury PA-C    ANESTHESIA: General.    INDICATIONS: The patient is a 38-year-old female who had left-sided breast cancer and she underwent bilateral mastectomies. The patient had tissue expanders placed at that time. She finished her subsequent expansion on both sides. She was taken to the operating room today for removal of bilateral breast tissue expanders and placement of permanent breast implants. The patient understood all of the risks and benefits of the procedure, including infection, need for future surgeries and elected to proceed.     FINDINGS:  1. Placement of an Allergan-style  cc implant silicone on the right with a serial number of [id number].  2. Placement of an Allergan-style  cc silicone breast implant on the left with a serial number of [id number].     DESCRIPTION OF PROCEDURE: The patient was seen in preoperative holding and then marked in a standing position and taken to the operating room by anesthesia after informed consent was signed and on the chart. The patient was placed supine on the operating room table and general anesthesia was induced. Once verified, the case was then turned over to the surgical service. The patient had her chest and abdomen prepped and draped in a normal sterile fashion. A timeout was called and all parties were in agreement including nursing and anesthesia. Preoperative antibiotics were given. We began with Dr. PHILIPPE's portion of the case. Please see his dictation for excision of skin  tumor. We then began my portion of the case. We began on the right side with a #15 blade scalpel making an incision with Bovie cautery down to the capsule. The expander was encountered. I removed all of the saline fluid with an 18-gauge needle and then the expander was then removed from the pocket. The pocket was inspected and noted to be in tight. Copious antibiotic irrigation was used in the pocket, and then a significant open capsulotomy was performed on the right. I then moved to the left side and removed the expander in a similar fashion. She was noted to have a tight capsule and significantly tight lateral edge to her pocket and capsule. We therefore performed a significant open periprosthetic capsulotomy to open the capsule.  Again, multiple liters of antibiotic irrigation were used in the pocket. I then scored the entire capsule both inferiorly, anteriorly and superiorly all over to allow it to expand better. This was done several times, even after placing sizers to try to expand the skin as best as possible. I placed multiple different sizers and settled with the aforementioned implants of 700 cc implant on the right and a 700 cc on the left for symmetry purposes. At this point, the capsules were then closed with 2-0 Vicryl in an interrupted fashion and then a 3-0 Monocryl and a 4-0 Monocryl to close the skin. Skin Affix glue was placed on the incisions. She then had Kerlix fluffs and a surgical bra placed. She was awakened, extubated and taken to PACU in stable condition. All sponge and instrument counts were correct. I, Dr. Stephan Cowan, was present and scrubbed for the entire procedure.     SPECIMENS: Bilateral breast tissue expanders.         ESTIMATED BLOOD LOSS: 20 cc.    DRAINS: None.     COMPLICATIONS: None immediate.       Stephan Cowan MD  02/19/19  1:00 PM

## 2019-02-19 NOTE — BRIEF OP NOTE
BREAST BIOPSY  Progress Note    Ina Ram  2/19/2019    Pre-op Diagnosis:   Left breast recurrent cancer       Post-Op Diagnosis Codes:     * Left breast mass [N63.20]    Procedure/CPT® Codes:      Procedure(s):  EXCISIONAL BIOPSY OF LEFT BREAST MASS  BILATERAL RECONSTRUCTED BREAST TISSUE EXPANDER EXCHANGE FOR PERM IMPLANT    Surgeon(s):  Josie Chaudhary MD Lynch, Michael Paul, MD    Anesthesia: General    Staff:   Circulator: Kimber Wilkerson RN; Aly Gomez RN  Scrub Person: Prabhu Shah; Milli Sneed  Nursing Assistant: Avani Mccormick  Assistant: Domingo Choudhury PA    Estimated Blood Loss:  minimal    Urine Voided: * No values recorded between 2/19/2019 10:45 AM and 2/19/2019 12:27 PM *    Specimens:                ID Type Source Tests Collected by Time   A : left breast biopsy, short stitch lateral, long stitch superior Tissue Breast, Left TISSUE PATHOLOGY EXAM Josie Chaudhary MD 2/19/2019 1155         Drains:   Closed/Suction Drain 1 Right Breast 15 Fr. (Active)       Closed/Suction Drain 1 Left Breast 15 Fr. (Active)       Closed/Suction Drain 2 Right Breast Bulb 15 Fr. (Active)       Closed/Suction Drain 2 Left Breast Bulb 15 Fr. (Active)       Findings: left lateral skin mass    Complications: none      Josie Chaudhary MD     Date: 2/19/2019  Time: 12:27 PM

## 2019-02-19 NOTE — ANESTHESIA POSTPROCEDURE EVALUATION
Patient: Ina Ram    Procedure Summary     Date:  02/19/19 Room / [facility]:   [facility]    Anesthesia Start:  1022 Anesthesia Stop:  1247    Procedures:       EXCISIONAL BIOPSY OF LEFT BREAST MASS (Left Breast)      BILATERAL RECONSTRUCTED BREAST TISSUE EXPANDER EXCHANGE FOR PERM IMPLANT (Bilateral Breast) Diagnosis:  Left breast mass    Surgeon:  Josie Chaudhary MD; Stephan Cowan MD Provider:  Jayme Dong MD    Anesthesia Type:  general ASA Status:  3          Anesthesia Type: general  Last vitals  BP   142/85 (02/19/19 1247)   Temp   97.3 °F (36.3 °C) (02/19/19 1247)   Pulse   75 (02/19/19 1247)   Resp   14 (02/19/19 1247)     SpO2   100 % (02/19/19 1247)     Post Anesthesia Care and Evaluation    Patient location during evaluation: PACU  Patient participation: waiting for patient participation  Level of consciousness: responsive to physical stimuli  Pain score: 0  Pain management: adequate  Airway patency: patent  Anesthetic complications: No anesthetic complications  PONV Status: none  Cardiovascular status: hemodynamically stable and acceptable  Respiratory status: nonlabored ventilation, acceptable and nasal cannula  Hydration status: acceptable

## 2019-02-19 NOTE — ANESTHESIA PROCEDURE NOTES
Airway  Urgency: elective    Airway not difficult    General Information and Staff    Patient location during procedure: OR  CRNA: Bowen Shelton CRNA    Indications and Patient Condition  Indications for airway management: airway protection    Preoxygenated: yes  MILS maintained throughout  Mask difficulty assessment: 1 - vent by mask    Final Airway Details  Final airway type: endotracheal airway      Successful airway: ETT  Cuffed: yes   Successful intubation technique: direct laryngoscopy  Facilitating devices/methods: intubating stylet  Endotracheal tube insertion site: oral  Blade: Karmen  Blade size: 3  ETT size (mm): 7.0  Cormack-Lehane Classification: grade IIa - partial view of glottis  Placement verified by: chest auscultation   Number of attempts at approach: 1    Additional Comments  Teeth as preop

## 2019-02-19 NOTE — OP NOTE
Operative Note    Date of Surgery:  2/19/2019    Pre-Operative Diagnosis: Recurrent left breast cancer    Post-Operative Diagnosis: Same    Procedure: Wide excision of recurrent left breast cancer    Anesthesia: General    Surgeon:  Josie Chaudhary MD    Assistant: JOBY Goss    Estimated Blood Loss: Very minimal    Findings: Isolated left lateral breast malignancy involving the skin    Complications: None      Indication for Procedure: Mrs. 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  She subsequently underwent bilateral skin sparing mastectomies with left sentinel for biopsy and immediate reconstruction.  She presented with an abnormal PET scan showing a focus of concern in the left lateral aspect of her breast which subsequently developed into a mass in the skin.  Punch biopsy showed poorly differentiated malignancy consistent with recurrence.  She presents today for excisional biopsy of the site as well as exchange of her expanders.    Procedure: Patient was taken to operating by anesthesia placed supine on the table.  Following induction of general anesthesia, she received 2 g of Kefzol IV.  SCDs were placed.  The breasts, chest, axilla and upper arms were then prepped and draped in sterile fashion.  Timeout was observed.  Marcaine 0.5% plain was administered around the palpable mass in the lateral aspect of the breast and a transverse elliptical incision measuring approximately 3-1/2 x 2 cm was made excising the mass down to the underlying subcutaneous tissue.  The specimen was marked with a long silk suture laterally and short suture superiorly.  Specimen was then sent to pathology for permanent section.  Following adequate hemostasis, Dr. Cowan proceeded with removing the expander.  The AlloDerm was well incorporated.  No palpable masses were noted  underneath the AlloDerm.  He proceeded with exchanging the expanders..  The patient was doing relatively well with anesthesia.  Sponge count and needle count were correct at the end of my procedure.    Josie Chaudhary MD  02/19/19  4:41 PM

## 2019-02-19 NOTE — INTERVAL H&P NOTE
"Pre-Op H&P (See Recent Office Note Attached for Full H&P)    Review of Systems:  General ROS:  no fever, chills, rashes, No change since last office visit  Cardiovascular ROS: no chest pain or dyspnea on exertion  Respiratory ROS: no cough, shortness of breath, or wheezing    Meds:    No current facility-administered medications on file prior to encounter.      Current Outpatient Medications on File Prior to Encounter   Medication Sig Dispense Refill   • Acetaminophen (TYLENOL 8 HOUR PO) Take 1 tablet by mouth Daily As Needed.     • IBUPROFEN PO Take 1 tablet by mouth As Needed.     • lidocaine-prilocaine (EMLA) 2.5-2.5 % cream Apply  topically As Needed for Mild Pain . Apply small amount of port area on skin 1 hour before access 30 g 5   • Multiple Vitamins-Minerals (MULTIVITAL PO) Take 1 tablet by mouth Daily.         Vital Signs:  /71 (BP Location: Right arm, Patient Position: Lying)   Pulse 83   Temp 98 °F (36.7 °C) (Temporal)   Resp 18   Ht 175.3 cm (69\")   Wt 78 kg (172 lb)   SpO2 100%   BMI 25.40 kg/m²     Physical Exam:    CV:  S1S2 regular rate and rhythm, no murmur               Resp:  Clear to auscultation; respirations regular, even and unlabored    Results Review:    I reviewed the patient's new clinical results.    Cancer Staging (if applicable)  Cancer Patient: _x_ yes __no __unknown; If yes, clinical stage yT:_1_ N:_0_M:_0_, stage group yIB (See Dr. PHILIPPE office note attached)    Assessment/Plan:    Left breast cancer - EXCISIONAL BIOPSY OF LEFT BREAST MASS (Dr. PHILIPPE) and BILATERAL RECONSTRUCTED BREAST TISSUE EXPANDER EXCHANGE FOR PERM IMPLANT (Dr. Cowan)    Ирина Becker, APRN  2/19/2019   9:22 AM        "

## 2019-02-19 NOTE — ANESTHESIA PREPROCEDURE EVALUATION
Anesthesia Evaluation     Patient summary reviewed and Nursing notes reviewed                Airway   Mallampati: II  Dental      Pulmonary - negative pulmonary ROS   Cardiovascular - negative cardio ROS        Neuro/Psych- negative ROS  GI/Hepatic/Renal/Endo - negative ROS     Musculoskeletal (-) negative ROS    Abdominal    Substance History - negative use     OB/GYN negative ob/gyn ROS         Other                        Anesthesia Plan    ASA 3     general     Anesthetic plan, all risks, benefits, and alternatives have been provided, discussed and informed consent has been obtained with: patient.

## 2019-02-20 LAB
CYTO UR: NORMAL
LAB AP CASE REPORT: NORMAL
LAB AP CLINICAL INFORMATION: NORMAL
LAB AP DIAGNOSIS COMMENT: NORMAL
PATH REPORT.FINAL DX SPEC: NORMAL
PATH REPORT.GROSS SPEC: NORMAL

## 2019-02-27 ENCOUNTER — RESEARCH ENCOUNTER (OUTPATIENT)
Dept: ONCOLOGY | Facility: CLINIC | Age: 39
End: 2019-02-27

## 2019-02-27 ENCOUNTER — LAB (OUTPATIENT)
Dept: LAB | Facility: HOSPITAL | Age: 39
End: 2019-02-27

## 2019-02-27 ENCOUNTER — OFFICE VISIT (OUTPATIENT)
Dept: ONCOLOGY | Facility: CLINIC | Age: 39
End: 2019-02-27

## 2019-02-27 ENCOUNTER — HOSPITAL ENCOUNTER (OUTPATIENT)
Dept: RADIATION ONCOLOGY | Facility: HOSPITAL | Age: 39
Setting detail: RADIATION/ONCOLOGY SERIES
Discharge: HOME | End: 2019-02-27

## 2019-02-27 ENCOUNTER — OFFICE VISIT (OUTPATIENT)
Dept: RADIATION ONCOLOGY | Facility: HOSPITAL | Age: 39
End: 2019-02-27

## 2019-02-27 VITALS
HEART RATE: 81 BPM | SYSTOLIC BLOOD PRESSURE: 129 MMHG | DIASTOLIC BLOOD PRESSURE: 76 MMHG | BODY MASS INDEX: 25.25 KG/M2 | RESPIRATION RATE: 20 BRPM | WEIGHT: 171 LBS

## 2019-02-27 VITALS
RESPIRATION RATE: 20 BRPM | BODY MASS INDEX: 25.33 KG/M2 | WEIGHT: 171 LBS | OXYGEN SATURATION: 99 % | TEMPERATURE: 97.2 F | DIASTOLIC BLOOD PRESSURE: 64 MMHG | HEIGHT: 69 IN | SYSTOLIC BLOOD PRESSURE: 112 MMHG | HEART RATE: 78 BPM

## 2019-02-27 DIAGNOSIS — Z00.6 RESEARCH SUBJECT: ICD-10-CM

## 2019-02-27 DIAGNOSIS — C50.412 MALIGNANT NEOPLASM OF UPPER-OUTER QUADRANT OF LEFT BREAST IN FEMALE, ESTROGEN RECEPTOR POSITIVE (HCC): Primary | ICD-10-CM

## 2019-02-27 DIAGNOSIS — C50.412 MALIGNANT NEOPLASM OF UPPER-OUTER QUADRANT OF LEFT BREAST IN FEMALE, ESTROGEN RECEPTOR POSITIVE (HCC): ICD-10-CM

## 2019-02-27 DIAGNOSIS — Z17.0 MALIGNANT NEOPLASM OF UPPER-OUTER QUADRANT OF LEFT BREAST IN FEMALE, ESTROGEN RECEPTOR POSITIVE (HCC): Primary | ICD-10-CM

## 2019-02-27 DIAGNOSIS — Z17.0 MALIGNANT NEOPLASM OF UPPER-OUTER QUADRANT OF LEFT BREAST IN FEMALE, ESTROGEN RECEPTOR POSITIVE (HCC): ICD-10-CM

## 2019-02-27 LAB
25(OH)D3 SERPL-MCNC: 19 NG/ML
ALBUMIN SERPL-MCNC: 4.96 G/DL (ref 3.2–4.8)
ALBUMIN/GLOB SERPL: 2.3 G/DL (ref 1.5–2.5)
ALP SERPL-CCNC: 75 U/L (ref 25–100)
ALT SERPL W P-5'-P-CCNC: 22 U/L (ref 7–40)
ANION GAP SERPL CALCULATED.3IONS-SCNC: 9 MMOL/L (ref 3–11)
AST SERPL-CCNC: 18 U/L (ref 0–33)
BILIRUB SERPL-MCNC: 0.3 MG/DL (ref 0.3–1.2)
BUN BLD-MCNC: 13 MG/DL (ref 9–23)
BUN/CREAT SERPL: 18.6 (ref 7–25)
CALCIUM SPEC-SCNC: 10 MG/DL (ref 8.7–10.4)
CHLORIDE SERPL-SCNC: 105 MMOL/L (ref 99–109)
CO2 SERPL-SCNC: 26 MMOL/L (ref 20–31)
CREAT BLD-MCNC: 0.7 MG/DL (ref 0.6–1.3)
ERYTHROCYTE [DISTWIDTH] IN BLOOD BY AUTOMATED COUNT: 15 % (ref 11.3–14.5)
GFR SERPL CREATININE-BSD FRML MDRD: 94 ML/MIN/1.73
GLOBULIN UR ELPH-MCNC: 2.1 GM/DL
GLUCOSE BLD-MCNC: 88 MG/DL (ref 70–100)
HCT VFR BLD AUTO: 38.8 % (ref 34.5–44)
HGB BLD-MCNC: 13 G/DL (ref 11.5–15.5)
LYMPHOCYTES # BLD AUTO: 1.7 10*3/MM3 (ref 0.6–4.8)
LYMPHOCYTES NFR BLD AUTO: 32.4 % (ref 24–44)
MCH RBC QN AUTO: 28.9 PG (ref 27–31)
MCHC RBC AUTO-ENTMCNC: 33.6 G/DL (ref 32–36)
MCV RBC AUTO: 86 FL (ref 80–99)
MONOCYTES # BLD AUTO: 0.4 10*3/MM3 (ref 0–1)
MONOCYTES NFR BLD AUTO: 7.5 % (ref 0–12)
NEUTROPHILS # BLD AUTO: 3.2 10*3/MM3 (ref 1.5–8.3)
NEUTROPHILS NFR BLD AUTO: 60.1 % (ref 41–71)
PLATELET # BLD AUTO: 225 10*3/MM3 (ref 150–450)
PMV BLD AUTO: 7.5 FL (ref 6–12)
POTASSIUM BLD-SCNC: 4.3 MMOL/L (ref 3.5–5.5)
PROT SERPL-MCNC: 7.1 G/DL (ref 5.7–8.2)
RBC # BLD AUTO: 4.51 10*6/MM3 (ref 3.89–5.14)
SODIUM BLD-SCNC: 140 MMOL/L (ref 132–146)
WBC NRBC COR # BLD: 5.4 10*3/MM3 (ref 3.5–10.8)

## 2019-02-27 PROCEDURE — 82306 VITAMIN D 25 HYDROXY: CPT | Performed by: INTERNAL MEDICINE

## 2019-02-27 PROCEDURE — 82607 VITAMIN B-12: CPT | Performed by: INTERNAL MEDICINE

## 2019-02-27 PROCEDURE — 99214 OFFICE O/P EST MOD 30 MIN: CPT | Performed by: INTERNAL MEDICINE

## 2019-02-27 PROCEDURE — G0463 HOSPITAL OUTPT CLINIC VISIT: HCPCS

## 2019-02-27 PROCEDURE — 80053 COMPREHEN METABOLIC PANEL: CPT

## 2019-02-27 PROCEDURE — 84443 ASSAY THYROID STIM HORMONE: CPT

## 2019-02-27 PROCEDURE — 85025 COMPLETE CBC W/AUTO DIFF WBC: CPT

## 2019-02-27 PROCEDURE — 36415 COLL VENOUS BLD VENIPUNCTURE: CPT

## 2019-02-27 NOTE — PROGRESS NOTES
"      PROBLEM LIST:  1. [id number] ER weakly positive (1-3%), DE negative, Her2 negative invasive ductal carcinoma of the left breast  A) presented with a palpable breast mass.  Biopsy showed ER+, DE- Her2- IDC, grade 3.  B) neoadjuvant TAC started July 2018.  Complicated by neutropenic fever after cycle 4.  Dose reduced to 80% for cycles 5 and 6.  C) bilateral mastectomy on 11/27/18.  Pathology showed a 1.7 cm high grade IDC with superficial invasion into the skeletal muscle.  0/2 SLN involved.  D) PET scan on 1/14/2019 showed a single focus of hypermetabolic activity along the left lateral aspect of the breast and chest wall.  On 2/19/2019 she underwent resection of this lesion which showed residual high-grade invasive ductal carcinoma with tumor extending to the deep margin.    Subjective     HISTORY OF PRESENT ILLNESS:   Ina Ram returns for follow-up.  Since her last visit she has had surgery with replacement of her tissue expanders with permanent implants.  She also had a biopsy of the PET positive chest wall lesion which did show residual carcinoma.  The margin was positive but further surgery is not possible after discussion with Dr. Chaudhary.  She will undergo adjuvant radiation treatment.  She says she has been feeling well but is a little overwhelmed by all of the recent events.    Past Medical History, Past Surgical History, Social History, Family History have been reviewed and are without significant changes except as mentioned.    Review of Systems   A comprehensive 14 point review of systems was performed and was negative except as mentioned.    Medications:  The current medication list was reviewed in the EMR    ALLERGIES:  No Known Allergies    Objective      /64 Comment: RUE  Pulse 78   Temp 97.2 °F (36.2 °C) (Temporal)   Resp 20   Ht 175.3 cm (69\")   Wt 77.6 kg (171 lb)   SpO2 99% Comment: RA  BMI 25.25 kg/m²      Performance Status: 0    General: well appearing female in no " acute distress  Neuro: alert and oriented  HEENT: sclera anicteric, oropharynx clear  Lymphatics: no cervical, supraclavicular, or axillary adenopathy  Chest: Well-healed left breast incision with a separate incision at the lateral edge of the left implant.  No palpable masses  Cardiovascular: regular rate and rhythm, no murmurs  Lungs: clear to auscultation bilaterally  Abdomen: soft, nontender, nondistended.  No palpable organomegaly  Extremeties: no lower extremity edema  Skin: no rashes, lesions, bruising, or petechiae  Psych: mood and affect appropriate          Assessment/Plan   Ina Ram is a 38 y.o. year old female with a stage II B ER weakly positive HER-2 negative breast cancer who returns for follow up.  She had a subcutaneous lesion that was found to be residual  disease which has now been excised.  She does have microscopic positive margins but I think that radiation treatment will likely be adequate for managing this, in particular as further surgery is not feasible.    We discussed options for additional therapy.  She is still potentially eligible and interested in the adjuvant Keytruda study.  She met briefly with the research coordinator today and we will move forward with plans for that, hopefully to start within the next month or so.  This can be done concurrently with radiation treatment.    Regardless of her future treatment, I will plan to repeat imaging in about 3 months, given the fairly rapid recurrence of this tumor in the chest wall.    Follow-up in 3 weeks.          I spent 25 minutes with the patient. I spent > 50% percent of this time counseling and discussing prognosis, diagnostic testing, evaluation, current status and management.      Fifi Malloy MD  [facility] On[facility]    2/27/2019          CC:

## 2019-02-27 NOTE — PROGRESS NOTES
CONSULTATION NOTE      :                                                          1980  DATE OF CONSULTATION:                       2019   REQUESTING PHYSICIAN:                   Josie Powell  REASON FOR CONSULTATION:           Malignant neoplasm of upper-outer quadrant of left breast in female, estrogen receptor positive (CMS/HCC)  Staging form: Breast, AJCC 8th Edition  - Clinical: Stage IIB (cT2, cN0, cM0, G3, ER: Positive, IN: Negative, HER2: Negative) - Signed by Noble lBancas MD on 2018  - Pathologic: Stage IB (pT1c, pN0, cM0, G3, ER: Negative, IN: Negative, HER2: Negative) - Signed by Fifi Malloy MD on 2019       BRIEF HISTORY:  The patient is a very pleasant 38 y.o. female  with subcutaneous recurrence of breast cancer after mastectomy.  She presented with a locally aggressive left breast cancer.  She underwent neoadjuvant chemotherapy.  She had residual disease at the time of surgery.  Tumor was adherent to pectoralis muscle.   She underwent bilateral mastectomy and reconstruction with expanders 2019.   1.7 cm diameter residual high-grade carcinoma was found invading into the skeletal muscle.  Surgical margins were negative.  No lymphovascular invasion identified.  No skin invasion.  Lymph nodes were negative.  She more recently presented with left lateral chest wall/infra axillary mass.    On PET/CT this was metabolically active, SUV 3.2, and there was a second area of increased density along the left anterior chest wall with lower metabolic activity, SUV 1.9.  The left lateral breast tumor was resected 2019 by Dr. PHILIPPE and at that time Dr. Cowan replaced the tissue expander with permanent implant.  Tumor pathology from the lateral breast was confirmed as recurrent high-grade invasive ductal carcinoma, 2.5 x 2 x 1.3 cm.  Tumor extended to the deep margin.  There was no evidence of epidermal involvement.      No Known Allergies    Social History      Socioeconomic History   • Marital status:      Spouse name: Not on file   • Number of children: Not on file   • Years of education: Not on file   • Highest education level: Not on file   Tobacco Use   • Smoking status: Never Smoker   • Smokeless tobacco: Never Used   Substance and Sexual Activity   • Alcohol use: No   • Drug use: No   • Sexual activity: Defer       Past Medical History:   Diagnosis Date   • Cancer (CMS/HCC)     breast cancer        family history includes Breast cancer in her cousin; No Known Problems in her father and mother; Prostate cancer in her maternal grandfather.     Past Surgical History:   Procedure Laterality Date   • BREAST BIOPSY Left 2/19/2019    Procedure: EXCISIONAL BIOPSY OF LEFT BREAST MASS;  Surgeon: Josie Chaudhary MD;  Location:  [facility];  Service: General   • BREAST RECONSTRUCTION, BREAST TISSUE EXPANDER INSERTION Bilateral 11/27/2018    Procedure: BREAST RECONSTRUCTION, BREAST TISSUE EXPANDER INSERTION BILATERAL;  Surgeon: Stephan Cowan MD;  Location:  [facility];  Service: Plastics   • BREAST RECONSTRUCTION, BREAST TISSUE EXPANDER REMOVAL, IMPLANT INSERTION Bilateral 2/19/2019    Procedure: BILATERAL RECONSTRUCTED BREAST TISSUE EXPANDER EXCHANGE FOR PERM IMPLANT;  Surgeon: Stephan Cowan MD;  Location:  [facility];  Service: Plastics   • MASTECTOMY     • MASTECTOMY W/ SENTINEL NODE BIOPSY Bilateral 11/27/2018    Procedure: BREAST MASTECTOMY BILATERAL  WITH LEFT SENTINEL NODE BIOPSY;  Surgeon: Josie Chaudhary MD;  Location:  [facility];  Service: General   • PORTACATH PLACEMENT     • TONSILLECTOMY Bilateral 1985   • WISDOM TOOTH EXTRACTION      at least 2 [id number]         Review of Systems   Constitutional: Positive for fatigue.           Objective   VITAL SIGNS:   Vitals:    02/27/19 1400   BP: 129/76   Pulse: 81   Resp: 20   Weight: 77.6 kg (171 lb)        No Data Recorded      Physical Exam   Constitutional: She is oriented to person, place,  and time. She appears well-developed and well-nourished.   HENT:   Head: Normocephalic and atraumatic.   Neck: Normal range of motion. Neck supple.   Cardiovascular: Normal rate, regular rhythm and normal heart sounds.   No murmur heard.  Pulmonary/Chest: Effort normal and breath sounds normal. She has no wheezes. She has no rales. She exhibits tenderness. She exhibits no mass ( Thickening along the lateral infra-axillary region corresponding to second resection of new nodule). Deformity:  Recent incision/reconstruction.   Abdominal: Soft. Bowel sounds are normal. She exhibits no distension. There is no hepatosplenomegaly. There is no tenderness.   Musculoskeletal: Normal range of motion. She exhibits no edema or tenderness.   Lymphadenopathy:     She has no cervical adenopathy.     She has no axillary adenopathy.        Right: No supraclavicular adenopathy present.        Left: No supraclavicular adenopathy present.   Neurological: She is alert and oriented to person, place, and time. She has normal strength. No sensory deficit.   Skin: Skin is warm and dry.   Psychiatric: She has a normal mood and affect. Her behavior is normal. Judgment and thought content normal.   Nursing note and vitals reviewed.           The following portions of the patient's history were reviewed and updated as appropriate: allergies, current medications, past family history, past medical history, past social history, past surgical history and problem list.    Assessment      Local recurrence of breast cancer on the left lateral chest wall/axillary region shortly following neoadjuvant chemotherapy and bilateral mastectomies with reconstruction.  She is only 8 days after her latest procedure with removal of the recurrent tumor and changing out of temporary expanders with permanent implant.  Resection margin of recurrence was positive.  She also had muscle invasion at the time of mastectomy and has a questionable area of hypermetabolism on  the anterior chest wall based on recent PET/CT.  Adjuvant radiotherapy is indicated to treat the entire left chest wall and lymphatics.    RECOMMENDATIONS: Treatment should begin as soon as she has been released from her surgeons and felt to have healed sufficiently to tolerate radiotherapy.  The reconstructed left chest wall and regional lymphatics will receive a dose of 45-50 Gy with boost to the site of recurrence and close margins to 60 Gy over 6 weeks.    DIBH technique will be used to reduce cardiac dose.  She would likely stay at [facility], since she lives quite a distance away.    Return in about 3 weeks (around 3/18/2019) for Office Visit, Simulation, DIBH.  Ina was seen today for breast cancer.    Diagnoses and all orders for this visit:    Malignant neoplasm of upper-outer quadrant of left breast in female, estrogen receptor positive (CMS/HCC)         Ramy Bales MD

## 2019-02-28 ENCOUNTER — TELEPHONE (OUTPATIENT)
Dept: ONCOLOGY | Facility: CLINIC | Age: 39
End: 2019-02-28

## 2019-02-28 DIAGNOSIS — Z00.6 RESEARCH SUBJECT: Primary | ICD-10-CM

## 2019-02-28 LAB
TSH SERPL DL<=0.05 MIU/L-ACNC: 1.11 MIU/ML (ref 0.35–5.35)
VIT B12 BLD-MCNC: 472 PG/ML (ref 211–911)

## 2019-02-28 NOTE — RESEARCH
I called Ms Ram today following our conversation yesterday.  We have discussed that she is still interested in the study.  I plan to see her on 3/7 (as this coordinates when she is seeing her plastic surgeon for follow up and she lives very far away) for the BAHO required portion of the study.  I will collect her blood for this, do questionnaires, and then will plan to get a TSH.  We should know about her PD-L1 expression by 3/13 (10 business days from 3/1/19).  I plan to randomize her this day and then she would potentially start treatment on 3/19/19.      Maryann Xiao   Research RN

## 2019-02-28 NOTE — RESEARCH
At the request of Dr Malloy, I spoke with Ms Ram regarding her previous consent to participate in the  trial.  She is still wanting to participate if she is eligible.  I briefly discussed with her that due to her re-excision with Dr PHILIPPE, Dr Malloy and I believe she would be eligible again because of the timeline allotted by the protocol (90 days from final breast surgery).  The patient was very excited about this.  I told her I would give her a call tomorrow to go over more of the details.  For now, we will plan to start her (if she is randomized to treatment) on 3/19/19.  I will go ahead and register her to step 1 once eligibility is confirmed and then ship her tissue for the required PD-L1 testing. When results come back (usually 10 business days), I will meet with her for the required BAHO portion of the study and then randomize her to Step 2.      Maryann Xiao   Research RN

## 2019-03-01 ENCOUNTER — APPOINTMENT (OUTPATIENT)
Dept: ONCOLOGY | Facility: HOSPITAL | Age: 39
End: 2019-03-01

## 2019-03-07 ENCOUNTER — RESEARCH ENCOUNTER (OUTPATIENT)
Dept: ONCOLOGY | Facility: CLINIC | Age: 39
End: 2019-03-07

## 2019-03-07 NOTE — RESEARCH
[facility].  She did the questionnaires and then we waldemar her blood to submit for the BAHO substudy as well.  I will get this shipped today.    Maryann Flower RN

## 2019-03-15 ENCOUNTER — TELEPHONE (OUTPATIENT)
Dept: ONCOLOGY | Facility: CLINIC | Age: 39
End: 2019-03-15

## 2019-03-15 NOTE — RESEARCH
I randomized Ms Ram today on the  trial.  Unfortunately, she randomized to the Observational arm of the study.  After discussing the plan for her moving forward with Dr Malloy, I called the patient to let her know that she was randomized to observation.  Ms Ram did not have any questions today, but was disappointed.  I told her that we made her infusion appointment into just a port flush so that we can get the required research blood.  Additionally, I told her that this was scheduled for 10am.  She will then see Dr Bales at 1pm.  She was agreeable and she knows to call me if she has any questions between now and then.    Maryann Xiao   Research RN

## 2019-03-19 ENCOUNTER — HOSPITAL ENCOUNTER (OUTPATIENT)
Dept: RADIATION ONCOLOGY | Facility: HOSPITAL | Age: 39
Setting detail: RADIATION/ONCOLOGY SERIES
Discharge: HOME | End: 2019-03-19

## 2019-03-19 ENCOUNTER — OFFICE VISIT (OUTPATIENT)
Dept: RADIATION ONCOLOGY | Facility: HOSPITAL | Age: 39
End: 2019-03-19

## 2019-03-19 ENCOUNTER — HOSPITAL ENCOUNTER (OUTPATIENT)
Dept: RADIATION ONCOLOGY | Facility: HOSPITAL | Age: 39
Discharge: HOME | End: 2019-03-19

## 2019-03-19 ENCOUNTER — RESEARCH ENCOUNTER (OUTPATIENT)
Dept: ONCOLOGY | Facility: CLINIC | Age: 39
End: 2019-03-19

## 2019-03-19 ENCOUNTER — APPOINTMENT (OUTPATIENT)
Dept: ONCOLOGY | Facility: HOSPITAL | Age: 39
End: 2019-03-19

## 2019-03-19 VITALS
DIASTOLIC BLOOD PRESSURE: 68 MMHG | WEIGHT: 172.7 LBS | OXYGEN SATURATION: 96 % | RESPIRATION RATE: 20 BRPM | TEMPERATURE: 97.5 F | SYSTOLIC BLOOD PRESSURE: 131 MMHG | BODY MASS INDEX: 25.5 KG/M2 | HEART RATE: 101 BPM

## 2019-03-19 DIAGNOSIS — Z17.0 MALIGNANT NEOPLASM OF UPPER-OUTER QUADRANT OF LEFT BREAST IN FEMALE, ESTROGEN RECEPTOR POSITIVE (HCC): ICD-10-CM

## 2019-03-19 DIAGNOSIS — C50.412 MALIGNANT NEOPLASM OF UPPER-OUTER QUADRANT OF LEFT BREAST IN FEMALE, ESTROGEN RECEPTOR POSITIVE (HCC): ICD-10-CM

## 2019-03-19 PROCEDURE — 77290 THER RAD SIMULAJ FIELD CPLX: CPT | Performed by: RADIOLOGY

## 2019-03-19 PROCEDURE — 77332 RADIATION TREATMENT AID(S): CPT | Performed by: RADIOLOGY

## 2019-03-19 PROCEDURE — G0463 HOSPITAL OUTPT CLINIC VISIT: HCPCS | Performed by: RADIOLOGY

## 2019-03-19 NOTE — RESEARCH
"I received a phone call today from Ms Ram after her appointment with Dr Bales in Rad [facility].  She is no longer interested in participating in the research trial.  I told her that this is fine and that I could meet her to sign the withdrawal form.  She met me in the research library and she signed the withdrawal form and checked that she would like to \"Stop active participation in the study, but let the research team continue to collect my health information\".  Therefore, we will still follow her for data collection purposes only.  I will let Dr Malloy know.      Maryann Xiao  Research RN   "

## 2019-03-19 NOTE — PROGRESS NOTES
RE-EVALUATION    PATIENT:                                                      Ina Ram  :                                                          1980  DATE:                          3/20/2019   DIAGNOSIS:     Breast cancer of left breast, recurrent  Cancer Staging  Stage IIB (cT2, cN0, cM0, G3, ER: Positive, OK: Negative, HER2: Negative)  Stage IB (pT1c, pN0, cM0, G3, ER: Negative, OK: Negative, HER2: Negative)       BRIEF HISTORY:  The patient is a very pleasant 38 y.o. female  with locally aggressive left breast cancer with resected local recurrence.  Her plastic surgeon has approved initiation of radiotherapy.    No Known Allergies    Review of Systems   Constitutional: Negative.    HENT:  Negative.    Eyes: Negative.    Respiratory: Negative.    Cardiovascular: Negative.    Gastrointestinal: Negative.    Endocrine: Negative.    Genitourinary: Negative.     Musculoskeletal: Negative.    Skin: Negative.    Neurological: Negative.    Hematological: Negative.    Psychiatric/Behavioral: Negative.            Objective   VITAL SIGNS:   Vitals:    19 1303   BP: 131/68   Pulse: 101   Resp: 20   Temp: 97.5 °F (36.4 °C)   TempSrc: Temporal   SpO2: 96%   Weight: 78.3 kg (172 lb 11.2 oz)   PainSc: 0-No pain        KPS 90%    Physical Exam   Constitutional: She is oriented to person, place, and time. She appears well-developed and well-nourished.   HENT:   Head: Normocephalic and atraumatic.   Neck: Normal range of motion. Neck supple.   Cardiovascular: Normal rate, regular rhythm and normal heart sounds.   No murmur heard.  Pulmonary/Chest: Effort normal and breath sounds normal. She has no wheezes. She has no rales. Left breast exhibits skin change. Left breast exhibits no mass and no tenderness.   Abdominal: Soft. Bowel sounds are normal. She exhibits no distension. There is no hepatosplenomegaly. There is no tenderness.   Musculoskeletal: Normal range of motion. She exhibits no edema or  tenderness.   Lymphadenopathy:     She has no cervical adenopathy.     She has no axillary adenopathy.        Right: No supraclavicular adenopathy present.        Left: No supraclavicular adenopathy present.   Neurological: She is alert and oriented to person, place, and time. She has normal strength. No sensory deficit.   Skin: Skin is warm and dry.   Psychiatric: She has a normal mood and affect. Her behavior is normal. Judgment and thought content normal.   Nursing note and vitals reviewed.           The following portions of the patient's history were reviewed and updated as appropriate: allergies, current medications, past family history, past medical history, past social history, past surgical history and problem list.    Diagnoses and all orders for this visit:    Malignant neoplasm of upper-outer quadrant of left breast in female, estrogen receptor positive (CMS/HCC)      IMPRESSION: Local recurrence of breast cancer on the left lateral chest wall/axillary region shortly following neoadjuvant chemotherapy and bilateral mastectomies with reconstruction.  She has healed after her latest procedure with removal of the recurrent tumor and changing out of temporary expanders with permanent implant.  Resection margin of recurrence was positive    RECOMMENDATIONS: Adjuvant radiotherapy.  50 Gy was delivered to the reconstructed left chest wall and regional lymphatics with a boost to 60 Gy at the site of recurrent disease.  Simulation today with deep inspiration breath-hold technique.       Ramy Bales MD

## 2019-03-25 PROCEDURE — 77300 RADIATION THERAPY DOSE PLAN: CPT | Performed by: RADIOLOGY

## 2019-03-25 PROCEDURE — 77295 3-D RADIOTHERAPY PLAN: CPT | Performed by: RADIOLOGY

## 2019-03-25 PROCEDURE — 77334 RADIATION TREATMENT AID(S): CPT | Performed by: RADIOLOGY

## 2019-03-29 ENCOUNTER — HOSPITAL ENCOUNTER (OUTPATIENT)
Dept: RADIATION ONCOLOGY | Facility: HOSPITAL | Age: 39
Discharge: HOME | End: 2019-03-29

## 2019-03-29 PROCEDURE — 77290 THER RAD SIMULAJ FIELD CPLX: CPT | Performed by: RADIOLOGY

## 2019-04-01 ENCOUNTER — HOSPITAL ENCOUNTER (OUTPATIENT)
Dept: RADIATION ONCOLOGY | Facility: HOSPITAL | Age: 39
Setting detail: RADIATION/ONCOLOGY SERIES
Discharge: HOME | End: 2019-04-01

## 2019-04-01 ENCOUNTER — HOSPITAL ENCOUNTER (OUTPATIENT)
Dept: RADIATION ONCOLOGY | Facility: HOSPITAL | Age: 39
Discharge: HOME | End: 2019-04-01

## 2019-04-01 PROCEDURE — 77387 GUIDANCE FOR RADJ TX DLVR: CPT | Performed by: RADIOLOGY

## 2019-04-01 PROCEDURE — 77412 RADIATION TX DELIVERY LVL 3: CPT | Performed by: RADIOLOGY

## 2019-04-02 ENCOUNTER — HOSPITAL ENCOUNTER (OUTPATIENT)
Dept: RADIATION ONCOLOGY | Facility: HOSPITAL | Age: 39
Discharge: HOME | End: 2019-04-02

## 2019-04-02 VITALS — WEIGHT: 173.6 LBS | BODY MASS INDEX: 25.64 KG/M2

## 2019-04-02 PROCEDURE — 77387 GUIDANCE FOR RADJ TX DLVR: CPT | Performed by: RADIOLOGY

## 2019-04-02 PROCEDURE — 77412 RADIATION TX DELIVERY LVL 3: CPT | Performed by: RADIOLOGY

## 2019-04-03 ENCOUNTER — HOSPITAL ENCOUNTER (OUTPATIENT)
Dept: RADIATION ONCOLOGY | Facility: HOSPITAL | Age: 39
Discharge: HOME | End: 2019-04-03

## 2019-04-03 PROCEDURE — 77387 GUIDANCE FOR RADJ TX DLVR: CPT | Performed by: RADIOLOGY

## 2019-04-03 PROCEDURE — 77412 RADIATION TX DELIVERY LVL 3: CPT | Performed by: RADIOLOGY

## 2019-04-04 ENCOUNTER — HOSPITAL ENCOUNTER (OUTPATIENT)
Dept: RADIATION ONCOLOGY | Facility: HOSPITAL | Age: 39
Discharge: HOME | End: 2019-04-04

## 2019-04-04 PROCEDURE — 77387 GUIDANCE FOR RADJ TX DLVR: CPT | Performed by: RADIOLOGY

## 2019-04-04 PROCEDURE — 77412 RADIATION TX DELIVERY LVL 3: CPT | Performed by: RADIOLOGY

## 2019-04-05 ENCOUNTER — HOSPITAL ENCOUNTER (OUTPATIENT)
Dept: RADIATION ONCOLOGY | Facility: HOSPITAL | Age: 39
Discharge: HOME | End: 2019-04-05

## 2019-04-05 PROCEDURE — 77387 GUIDANCE FOR RADJ TX DLVR: CPT | Performed by: RADIOLOGY

## 2019-04-05 PROCEDURE — 77336 RADIATION PHYSICS CONSULT: CPT | Performed by: RADIOLOGY

## 2019-04-05 PROCEDURE — 77412 RADIATION TX DELIVERY LVL 3: CPT | Performed by: RADIOLOGY

## 2019-04-08 ENCOUNTER — HOSPITAL ENCOUNTER (OUTPATIENT)
Dept: RADIATION ONCOLOGY | Facility: HOSPITAL | Age: 39
Discharge: HOME | End: 2019-04-08

## 2019-04-08 PROCEDURE — 77387 GUIDANCE FOR RADJ TX DLVR: CPT | Performed by: RADIOLOGY

## 2019-04-08 PROCEDURE — 77412 RADIATION TX DELIVERY LVL 3: CPT | Performed by: RADIOLOGY

## 2019-04-09 ENCOUNTER — HOSPITAL ENCOUNTER (OUTPATIENT)
Dept: RADIATION ONCOLOGY | Facility: HOSPITAL | Age: 39
Discharge: HOME | End: 2019-04-09

## 2019-04-09 VITALS — BODY MASS INDEX: 25.58 KG/M2 | WEIGHT: 173.2 LBS

## 2019-04-09 PROCEDURE — 77387 GUIDANCE FOR RADJ TX DLVR: CPT | Performed by: RADIOLOGY

## 2019-04-09 PROCEDURE — 77412 RADIATION TX DELIVERY LVL 3: CPT | Performed by: RADIOLOGY

## 2019-04-10 ENCOUNTER — HOSPITAL ENCOUNTER (OUTPATIENT)
Dept: RADIATION ONCOLOGY | Facility: HOSPITAL | Age: 39
Discharge: HOME | End: 2019-04-10

## 2019-04-10 PROCEDURE — 77333 RADIATION TREATMENT AID(S): CPT | Performed by: RADIOLOGY

## 2019-04-10 PROCEDURE — 77387 GUIDANCE FOR RADJ TX DLVR: CPT | Performed by: RADIOLOGY

## 2019-04-10 PROCEDURE — 77412 RADIATION TX DELIVERY LVL 3: CPT | Performed by: RADIOLOGY

## 2019-04-11 ENCOUNTER — HOSPITAL ENCOUNTER (OUTPATIENT)
Dept: RADIATION ONCOLOGY | Facility: HOSPITAL | Age: 39
Discharge: HOME | End: 2019-04-11

## 2019-04-11 PROCEDURE — 77387 GUIDANCE FOR RADJ TX DLVR: CPT | Performed by: RADIOLOGY

## 2019-04-11 PROCEDURE — 77412 RADIATION TX DELIVERY LVL 3: CPT | Performed by: RADIOLOGY

## 2019-04-11 PROCEDURE — 77336 RADIATION PHYSICS CONSULT: CPT | Performed by: RADIOLOGY

## 2019-04-12 ENCOUNTER — HOSPITAL ENCOUNTER (OUTPATIENT)
Dept: RADIATION ONCOLOGY | Facility: HOSPITAL | Age: 39
Discharge: HOME | End: 2019-04-12

## 2019-04-12 PROCEDURE — 77417 THER RADIOLOGY PORT IMAGE(S): CPT | Performed by: RADIOLOGY

## 2019-04-12 PROCEDURE — 77412 RADIATION TX DELIVERY LVL 3: CPT | Performed by: RADIOLOGY

## 2019-04-12 PROCEDURE — 77387 GUIDANCE FOR RADJ TX DLVR: CPT | Performed by: RADIOLOGY

## 2019-04-15 ENCOUNTER — HOSPITAL ENCOUNTER (OUTPATIENT)
Dept: RADIATION ONCOLOGY | Facility: HOSPITAL | Age: 39
Discharge: HOME | End: 2019-04-15

## 2019-04-15 PROCEDURE — 77387 GUIDANCE FOR RADJ TX DLVR: CPT | Performed by: RADIOLOGY

## 2019-04-15 PROCEDURE — 77412 RADIATION TX DELIVERY LVL 3: CPT | Performed by: RADIOLOGY

## 2019-04-16 ENCOUNTER — HOSPITAL ENCOUNTER (OUTPATIENT)
Dept: RADIATION ONCOLOGY | Facility: HOSPITAL | Age: 39
Discharge: HOME | End: 2019-04-16

## 2019-04-16 VITALS — WEIGHT: 172 LBS | BODY MASS INDEX: 25.4 KG/M2

## 2019-04-16 PROCEDURE — 77387 GUIDANCE FOR RADJ TX DLVR: CPT | Performed by: RADIOLOGY

## 2019-04-16 PROCEDURE — 77412 RADIATION TX DELIVERY LVL 3: CPT | Performed by: RADIOLOGY

## 2019-04-17 ENCOUNTER — HOSPITAL ENCOUNTER (OUTPATIENT)
Dept: RADIATION ONCOLOGY | Facility: HOSPITAL | Age: 39
Discharge: HOME | End: 2019-04-17

## 2019-04-17 PROCEDURE — 77412 RADIATION TX DELIVERY LVL 3: CPT | Performed by: RADIOLOGY

## 2019-04-17 PROCEDURE — 77387 GUIDANCE FOR RADJ TX DLVR: CPT | Performed by: RADIOLOGY

## 2019-04-18 ENCOUNTER — HOSPITAL ENCOUNTER (OUTPATIENT)
Dept: RADIATION ONCOLOGY | Facility: HOSPITAL | Age: 39
Discharge: HOME | End: 2019-04-18

## 2019-04-18 PROCEDURE — 77336 RADIATION PHYSICS CONSULT: CPT | Performed by: RADIOLOGY

## 2019-04-18 PROCEDURE — 77412 RADIATION TX DELIVERY LVL 3: CPT | Performed by: RADIOLOGY

## 2019-04-18 PROCEDURE — 77387 GUIDANCE FOR RADJ TX DLVR: CPT | Performed by: RADIOLOGY

## 2019-04-19 ENCOUNTER — HOSPITAL ENCOUNTER (OUTPATIENT)
Dept: RADIATION ONCOLOGY | Facility: HOSPITAL | Age: 39
Discharge: HOME | End: 2019-04-19

## 2019-04-19 PROCEDURE — 77412 RADIATION TX DELIVERY LVL 3: CPT | Performed by: RADIOLOGY

## 2019-04-19 PROCEDURE — 77387 GUIDANCE FOR RADJ TX DLVR: CPT | Performed by: RADIOLOGY

## 2019-04-22 ENCOUNTER — HOSPITAL ENCOUNTER (OUTPATIENT)
Dept: RADIATION ONCOLOGY | Facility: HOSPITAL | Age: 39
Discharge: HOME | End: 2019-04-22

## 2019-04-22 PROCEDURE — 77412 RADIATION TX DELIVERY LVL 3: CPT | Performed by: RADIOLOGY

## 2019-04-22 PROCEDURE — 77387 GUIDANCE FOR RADJ TX DLVR: CPT | Performed by: RADIOLOGY

## 2019-04-23 ENCOUNTER — HOSPITAL ENCOUNTER (OUTPATIENT)
Dept: RADIATION ONCOLOGY | Facility: HOSPITAL | Age: 39
Discharge: HOME | End: 2019-04-23

## 2019-04-23 PROCEDURE — 77412 RADIATION TX DELIVERY LVL 3: CPT | Performed by: RADIOLOGY

## 2019-04-23 PROCEDURE — 77387 GUIDANCE FOR RADJ TX DLVR: CPT | Performed by: RADIOLOGY

## 2019-04-24 ENCOUNTER — HOSPITAL ENCOUNTER (OUTPATIENT)
Dept: RADIATION ONCOLOGY | Facility: HOSPITAL | Age: 39
Discharge: HOME | End: 2019-04-24

## 2019-04-24 VITALS — WEIGHT: 173.5 LBS | BODY MASS INDEX: 25.62 KG/M2

## 2019-04-24 DIAGNOSIS — Z17.0 MALIGNANT NEOPLASM OF UPPER-OUTER QUADRANT OF LEFT BREAST IN FEMALE, ESTROGEN RECEPTOR POSITIVE (HCC): Primary | ICD-10-CM

## 2019-04-24 DIAGNOSIS — C50.412 MALIGNANT NEOPLASM OF UPPER-OUTER QUADRANT OF LEFT BREAST IN FEMALE, ESTROGEN RECEPTOR POSITIVE (HCC): Primary | ICD-10-CM

## 2019-04-24 PROCEDURE — 77387 GUIDANCE FOR RADJ TX DLVR: CPT | Performed by: RADIOLOGY

## 2019-04-24 PROCEDURE — 77412 RADIATION TX DELIVERY LVL 3: CPT | Performed by: RADIOLOGY

## 2019-04-25 ENCOUNTER — HOSPITAL ENCOUNTER (OUTPATIENT)
Dept: RADIATION ONCOLOGY | Facility: HOSPITAL | Age: 39
Discharge: HOME | End: 2019-04-25

## 2019-04-25 PROCEDURE — 77387 GUIDANCE FOR RADJ TX DLVR: CPT | Performed by: RADIOLOGY

## 2019-04-25 PROCEDURE — 77412 RADIATION TX DELIVERY LVL 3: CPT | Performed by: RADIOLOGY

## 2019-04-26 ENCOUNTER — HOSPITAL ENCOUNTER (OUTPATIENT)
Dept: RADIATION ONCOLOGY | Facility: HOSPITAL | Age: 39
Discharge: HOME | End: 2019-04-26

## 2019-04-26 PROCEDURE — 77336 RADIATION PHYSICS CONSULT: CPT | Performed by: RADIOLOGY

## 2019-04-26 PROCEDURE — 77387 GUIDANCE FOR RADJ TX DLVR: CPT | Performed by: RADIOLOGY

## 2019-04-26 PROCEDURE — 77412 RADIATION TX DELIVERY LVL 3: CPT | Performed by: RADIOLOGY

## 2019-04-29 ENCOUNTER — HOSPITAL ENCOUNTER (OUTPATIENT)
Dept: RADIATION ONCOLOGY | Facility: HOSPITAL | Age: 39
Discharge: HOME | End: 2019-04-29

## 2019-04-29 PROCEDURE — 77412 RADIATION TX DELIVERY LVL 3: CPT | Performed by: RADIOLOGY

## 2019-04-29 PROCEDURE — 77387 GUIDANCE FOR RADJ TX DLVR: CPT | Performed by: RADIOLOGY

## 2019-04-30 ENCOUNTER — HOSPITAL ENCOUNTER (OUTPATIENT)
Dept: RADIATION ONCOLOGY | Facility: HOSPITAL | Age: 39
Discharge: HOME | End: 2019-04-30

## 2019-04-30 VITALS — BODY MASS INDEX: 25.61 KG/M2 | WEIGHT: 173.4 LBS

## 2019-04-30 PROCEDURE — 77412 RADIATION TX DELIVERY LVL 3: CPT | Performed by: RADIOLOGY

## 2019-04-30 PROCEDURE — 77387 GUIDANCE FOR RADJ TX DLVR: CPT | Performed by: RADIOLOGY

## 2019-05-01 ENCOUNTER — HOSPITAL ENCOUNTER (OUTPATIENT)
Dept: RADIATION ONCOLOGY | Facility: HOSPITAL | Age: 39
Setting detail: RADIATION/ONCOLOGY SERIES
Discharge: HOME | End: 2019-05-01

## 2019-05-01 ENCOUNTER — HOSPITAL ENCOUNTER (OUTPATIENT)
Dept: RADIATION ONCOLOGY | Facility: HOSPITAL | Age: 39
Discharge: HOME | End: 2019-05-01

## 2019-05-01 PROCEDURE — 77412 RADIATION TX DELIVERY LVL 3: CPT | Performed by: RADIOLOGY

## 2019-05-01 PROCEDURE — 77387 GUIDANCE FOR RADJ TX DLVR: CPT | Performed by: RADIOLOGY

## 2019-05-02 ENCOUNTER — HOSPITAL ENCOUNTER (OUTPATIENT)
Dept: RADIATION ONCOLOGY | Facility: HOSPITAL | Age: 39
Discharge: HOME | End: 2019-05-02

## 2019-05-02 PROCEDURE — 77412 RADIATION TX DELIVERY LVL 3: CPT | Performed by: RADIOLOGY

## 2019-05-02 PROCEDURE — 77387 GUIDANCE FOR RADJ TX DLVR: CPT | Performed by: RADIOLOGY

## 2019-05-02 NOTE — PROGRESS NOTES
Patient has concern about a couple of pigmented lesions noted in the left axilla.  These appear to be irritated nevi which are soft and not suspicious for neoplasm.  These areas are within the radiation field and she has also been shaving her underarm with a blade.  She was advised to continue topical ointment ointment, avoid using a sharp razor or antiperspirant/deodorant until this area has healed.  Continue radiotherapy as planned.

## 2019-05-03 ENCOUNTER — HOSPITAL ENCOUNTER (OUTPATIENT)
Dept: RADIATION ONCOLOGY | Facility: HOSPITAL | Age: 39
Discharge: HOME | End: 2019-05-03

## 2019-05-03 PROCEDURE — 77387 GUIDANCE FOR RADJ TX DLVR: CPT | Performed by: RADIOLOGY

## 2019-05-03 PROCEDURE — 77321 SPECIAL TELETX PORT PLAN: CPT | Performed by: RADIOLOGY

## 2019-05-03 PROCEDURE — 77334 RADIATION TREATMENT AID(S): CPT | Performed by: RADIOLOGY

## 2019-05-03 PROCEDURE — 77412 RADIATION TX DELIVERY LVL 3: CPT | Performed by: RADIOLOGY

## 2019-05-03 PROCEDURE — 77336 RADIATION PHYSICS CONSULT: CPT | Performed by: RADIOLOGY

## 2019-05-06 ENCOUNTER — HOSPITAL ENCOUNTER (OUTPATIENT)
Dept: RADIATION ONCOLOGY | Facility: HOSPITAL | Age: 39
Discharge: HOME | End: 2019-05-06

## 2019-05-06 PROCEDURE — 77412 RADIATION TX DELIVERY LVL 3: CPT | Performed by: RADIOLOGY

## 2019-05-07 ENCOUNTER — HOSPITAL ENCOUNTER (OUTPATIENT)
Dept: RADIATION ONCOLOGY | Facility: HOSPITAL | Age: 39
Discharge: HOME | End: 2019-05-07

## 2019-05-07 VITALS — BODY MASS INDEX: 25.9 KG/M2 | WEIGHT: 175.4 LBS

## 2019-05-07 PROCEDURE — 77412 RADIATION TX DELIVERY LVL 3: CPT | Performed by: RADIOLOGY

## 2019-05-08 ENCOUNTER — HOSPITAL ENCOUNTER (OUTPATIENT)
Dept: RADIATION ONCOLOGY | Facility: HOSPITAL | Age: 39
Discharge: HOME | End: 2019-05-08

## 2019-05-08 PROCEDURE — 77412 RADIATION TX DELIVERY LVL 3: CPT | Performed by: RADIOLOGY

## 2019-05-09 ENCOUNTER — HOSPITAL ENCOUNTER (OUTPATIENT)
Dept: RADIATION ONCOLOGY | Facility: HOSPITAL | Age: 39
Discharge: HOME | End: 2019-05-09

## 2019-05-09 PROCEDURE — 77412 RADIATION TX DELIVERY LVL 3: CPT | Performed by: RADIOLOGY

## 2019-05-09 PROCEDURE — 77336 RADIATION PHYSICS CONSULT: CPT | Performed by: RADIOLOGY

## 2019-05-10 ENCOUNTER — HOSPITAL ENCOUNTER (OUTPATIENT)
Dept: RADIATION ONCOLOGY | Facility: HOSPITAL | Age: 39
Discharge: HOME | End: 2019-05-10

## 2019-05-10 ENCOUNTER — OFFICE VISIT (OUTPATIENT)
Dept: ONCOLOGY | Facility: CLINIC | Age: 39
End: 2019-05-10

## 2019-05-10 VITALS
HEIGHT: 69 IN | HEART RATE: 83 BPM | SYSTOLIC BLOOD PRESSURE: 119 MMHG | BODY MASS INDEX: 26.07 KG/M2 | TEMPERATURE: 97.5 F | DIASTOLIC BLOOD PRESSURE: 59 MMHG | WEIGHT: 176 LBS | RESPIRATION RATE: 13 BRPM

## 2019-05-10 DIAGNOSIS — C50.412 MALIGNANT NEOPLASM OF UPPER-OUTER QUADRANT OF LEFT BREAST IN FEMALE, ESTROGEN RECEPTOR POSITIVE (HCC): Primary | ICD-10-CM

## 2019-05-10 DIAGNOSIS — Z17.0 MALIGNANT NEOPLASM OF UPPER-OUTER QUADRANT OF LEFT BREAST IN FEMALE, ESTROGEN RECEPTOR POSITIVE (HCC): Primary | ICD-10-CM

## 2019-05-10 PROCEDURE — 77412 RADIATION TX DELIVERY LVL 3: CPT | Performed by: RADIOLOGY

## 2019-05-10 PROCEDURE — 99214 OFFICE O/P EST MOD 30 MIN: CPT | Performed by: INTERNAL MEDICINE

## 2019-05-10 NOTE — PROGRESS NOTES
"      PROBLEM LIST:  1. [id number] ER weakly positive (1-3%), NV negative, Her2 negative invasive ductal carcinoma of the left breast  A) presented with a palpable breast mass.  Biopsy showed ER+, NV- Her2- IDC, grade 3.  B) neoadjuvant TAC started July 2018.  Complicated by neutropenic fever after cycle 4.  Dose reduced to 80% for cycles 5 and 6.  C) bilateral mastectomy on 11/27/18.  Pathology showed a 1.7 cm high grade IDC with superficial invasion into the skeletal muscle.  0/2 SLN involved.  D) PET scan on 1/14/2019 showed a single focus of hypermetabolic activity along the left lateral aspect of the breast and chest wall.  On 2/19/2019 she underwent resection of this lesion which showed residual high-grade invasive ductal carcinoma with tumor extending to the deep margin.  Further resection not possible.  Adjuvant radiotherapy completed 5/10/19.    Subjective     HISTORY OF PRESENT ILLNESS:   Ina Ram returns for follow-up.  She is currently receiving radiation treatment and actually finished today.  She has some skin irritation under her arms but otherwise she tolerated the treatment well.  She has several moles and wonders if she needs to have these looked at.  She otherwise is feeling okay.    Past Medical History, Past Surgical History, Social History, Family History have been reviewed and are without significant changes except as mentioned.    Review of Systems   A comprehensive 14 point review of systems was performed and was negative except as mentioned.    Medications:  The current medication list was reviewed in the EMR    ALLERGIES:  No Known Allergies    Objective      /59   Pulse 83   Temp 97.5 °F (36.4 °C) (Oral)   Resp 13   Ht 175.3 cm (69\")   Wt 79.8 kg (176 lb)   BMI 25.99 kg/m²      Performance Status: 0    General: well appearing female in no acute distress  Neuro: alert and oriented  HEENT: sclera anicteric, oropharynx clear  Lymphatics: no cervical, supraclavicular, or " axillary adenopathy  Chest: Skin over the left axilla is irritated with some desquamation.  No palpable mass.  She does have firm scar tissue at the lateral edge of the implant.  Cardiovascular: regular rate and rhythm, no murmurs  Lungs: clear to auscultation bilaterally  Abdomen: soft, nontender, nondistended.  No palpable organomegaly  Extremeties: no lower extremity edema  Skin: no rashes, lesions, bruising, or petechiae  Psych: mood and affect appropriate          Assessment/Plan   Ina Ram is a 38 y.o. year old female with a stage II B ER weakly positive HER-2 negative breast cancer who returns for follow up.  She has completed radiation therapy.  She has decided to withdraw from the adjuvant Keytruda study.  Given her recurrent disease and positive margins of resection, I think follow-up imaging of the chest is indicated.  I will order repeat PET scan to be done in about 1 month.    We discussed options for additional therapy.  She would be a candidate for Xeloda for 6 months.  This has been shown in some studies to improve outcomes, particularly in triple negative disease.   We reviewed the potential side effects of capecitabine including myelosuppression, nausea, diarrhea, hand-foot syndrome, coronary vasospasm, and mucositis.    I will plan to see her back in about 1 month with imaging.  We will start Xeloda at that time.      I spent 25 minutes with the patient. I spent > 50% percent of this time counseling and discussing prognosis, diagnostic testing, evaluation, current status and management.      Fifi Malloy MD  [facility]    5/10/2019          CC:

## 2019-05-13 ENCOUNTER — SPECIALTY PHARMACY (OUTPATIENT)
Dept: ONCOLOGY | Facility: HOSPITAL | Age: 39
End: 2019-05-13

## 2019-05-13 DIAGNOSIS — C50.412 MALIGNANT NEOPLASM OF UPPER-OUTER QUADRANT OF LEFT BREAST IN FEMALE, ESTROGEN RECEPTOR POSITIVE (HCC): Primary | ICD-10-CM

## 2019-05-13 DIAGNOSIS — Z17.0 MALIGNANT NEOPLASM OF UPPER-OUTER QUADRANT OF LEFT BREAST IN FEMALE, ESTROGEN RECEPTOR POSITIVE (HCC): Primary | ICD-10-CM

## 2019-05-13 RX ORDER — CAPECITABINE 500 MG/1
1000 TABLET, FILM COATED ORAL 2 TIMES DAILY
Qty: 112 TABLET | Refills: 7 | Status: SHIPPED | OUTPATIENT
Start: 2019-05-13 | End: 2019-05-14 | Stop reason: SDUPTHER

## 2019-05-13 NOTE — PROGRESS NOTES
Oral Chemotherapy Teaching      Patient Name/:  Ina Ram   1980  Oral Chemotherapy Regimen:Xeloda 2000mg PO BID x 14 days, followed by7 days off   Date Started Medication: Cycle 1: anticipate 19      Additional Notes: Plan for 8 adjuvant cycles of Xeloda starting in mid- per . Submitted script for Xeloda 2000mg PO BID x 14 days, followed by 7 days off to [employer] to begin processing. Will need scheduled in oral chemotherapy clinic for EDU.

## 2019-05-14 ENCOUNTER — SPECIALTY PHARMACY (OUTPATIENT)
Dept: ONCOLOGY | Facility: HOSPITAL | Age: 39
End: 2019-05-14

## 2019-05-14 DIAGNOSIS — C50.412 MALIGNANT NEOPLASM OF UPPER-OUTER QUADRANT OF LEFT BREAST IN FEMALE, ESTROGEN RECEPTOR POSITIVE (HCC): ICD-10-CM

## 2019-05-14 DIAGNOSIS — Z17.0 MALIGNANT NEOPLASM OF UPPER-OUTER QUADRANT OF LEFT BREAST IN FEMALE, ESTROGEN RECEPTOR POSITIVE (HCC): ICD-10-CM

## 2019-05-14 RX ORDER — CAPECITABINE 500 MG/1
1000 TABLET, FILM COATED ORAL 2 TIMES DAILY
Qty: 112 TABLET | Refills: 7 | Status: SHIPPED | OUTPATIENT
Start: 2019-05-14 | End: 2020-05-07

## 2019-06-11 ENCOUNTER — TELEPHONE (OUTPATIENT)
Dept: ONCOLOGY | Facility: CLINIC | Age: 39
End: 2019-06-11

## 2019-06-11 NOTE — TELEPHONE ENCOUNTER
----- Message from Ayde Chávez RN sent at 6/10/2019  3:26 PM EDT -----  Regarding: FW: SUHAS - PET SCAN / PORT REMOVAL   Contact: [phone]      ----- Message -----  From: Herlinda Sutton  Sent: 6/10/2019  11:15 AM  To: [facility]  Subject: SUHAS - PET SCAN / PORT REMOVAL                 PATIENT CALLED AND SAID THAT THEY HAVE SCHEDULED HER PORT REMOVAL FOR THE SAME MORNING AS HER PET SCAN, AND WANTS TO KNOW IF THAT IS OK. WILL IT INTERFERE WITH PET SCAN

## 2019-06-13 ENCOUNTER — HOSPITAL ENCOUNTER (OUTPATIENT)
Dept: RADIATION ONCOLOGY | Facility: HOSPITAL | Age: 39
Setting detail: RADIATION/ONCOLOGY SERIES
Discharge: HOME | End: 2019-06-13

## 2019-06-14 ENCOUNTER — LAB (OUTPATIENT)
Dept: LAB | Facility: HOSPITAL | Age: 39
End: 2019-06-14

## 2019-06-14 ENCOUNTER — OFFICE VISIT (OUTPATIENT)
Dept: ONCOLOGY | Facility: CLINIC | Age: 39
End: 2019-06-14

## 2019-06-14 ENCOUNTER — SPECIALTY PHARMACY (OUTPATIENT)
Dept: ONCOLOGY | Facility: HOSPITAL | Age: 39
End: 2019-06-14

## 2019-06-14 ENCOUNTER — HOSPITAL ENCOUNTER (OUTPATIENT)
Dept: PET IMAGING | Facility: HOSPITAL | Age: 39
Discharge: HOME | End: 2019-06-14

## 2019-06-14 ENCOUNTER — HOSPITAL ENCOUNTER (OUTPATIENT)
Dept: ONCOLOGY | Facility: HOSPITAL | Age: 39
Discharge: HOME | End: 2019-06-14
Admitting: INTERNAL MEDICINE

## 2019-06-14 VITALS
OXYGEN SATURATION: 100 % | SYSTOLIC BLOOD PRESSURE: 119 MMHG | TEMPERATURE: 97.9 F | WEIGHT: 176 LBS | DIASTOLIC BLOOD PRESSURE: 62 MMHG | HEART RATE: 83 BPM | HEIGHT: 69 IN | RESPIRATION RATE: 16 BRPM | BODY MASS INDEX: 26.07 KG/M2

## 2019-06-14 DIAGNOSIS — Z17.0 MALIGNANT NEOPLASM OF UPPER-OUTER QUADRANT OF LEFT BREAST IN FEMALE, ESTROGEN RECEPTOR POSITIVE (HCC): ICD-10-CM

## 2019-06-14 DIAGNOSIS — C50.412 MALIGNANT NEOPLASM OF UPPER-OUTER QUADRANT OF LEFT BREAST IN FEMALE, ESTROGEN RECEPTOR POSITIVE (HCC): Primary | ICD-10-CM

## 2019-06-14 DIAGNOSIS — C50.412 MALIGNANT NEOPLASM OF UPPER-OUTER QUADRANT OF LEFT BREAST IN FEMALE, ESTROGEN RECEPTOR POSITIVE (HCC): ICD-10-CM

## 2019-06-14 DIAGNOSIS — Z17.0 MALIGNANT NEOPLASM OF UPPER-OUTER QUADRANT OF LEFT BREAST IN FEMALE, ESTROGEN RECEPTOR POSITIVE (HCC): Primary | ICD-10-CM

## 2019-06-14 LAB
ALBUMIN SERPL-MCNC: 4.7 G/DL (ref 3.5–5.2)
ALBUMIN/GLOB SERPL: 1.8 G/DL
ALP SERPL-CCNC: 72 U/L (ref 39–117)
ALT SERPL W P-5'-P-CCNC: 21 U/L (ref 1–33)
ANION GAP SERPL CALCULATED.3IONS-SCNC: 10 MMOL/L
AST SERPL-CCNC: 18 U/L (ref 1–32)
BASOPHILS # BLD AUTO: 0.03 10*3/MM3 (ref 0–0.2)
BASOPHILS NFR BLD AUTO: 0.9 % (ref 0–1.5)
BILIRUB SERPL-MCNC: 0.5 MG/DL (ref 0.2–1.2)
BUN BLD-MCNC: 12 MG/DL (ref 6–20)
BUN/CREAT SERPL: 19 (ref 7–25)
CALCIUM SPEC-SCNC: 9.9 MG/DL (ref 8.6–10.5)
CHLORIDE SERPL-SCNC: 102 MMOL/L (ref 98–107)
CO2 SERPL-SCNC: 23 MMOL/L (ref 22–29)
CREAT BLD-MCNC: 0.63 MG/DL (ref 0.57–1)
DEPRECATED RDW RBC AUTO: 47.3 FL (ref 37–54)
EOSINOPHIL # BLD AUTO: 0.11 10*3/MM3 (ref 0–0.4)
EOSINOPHIL NFR BLD AUTO: 3.3 % (ref 0.3–6.2)
ERYTHROCYTE [DISTWIDTH] IN BLOOD BY AUTOMATED COUNT: 13.6 % (ref 12.3–15.4)
GFR SERPL CREATININE-BSD FRML MDRD: 105 ML/MIN/1.73
GLOBULIN UR ELPH-MCNC: 2.6 GM/DL
GLUCOSE BLD-MCNC: 87 MG/DL (ref 65–99)
GLUCOSE BLDC GLUCOMTR-MCNC: 88 MG/DL (ref 70–130)
HCT VFR BLD AUTO: 41.4 % (ref 34–46.6)
HGB BLD-MCNC: 13.3 G/DL (ref 12–15.9)
IMM GRANULOCYTES # BLD AUTO: 0.01 10*3/MM3 (ref 0–0.05)
IMM GRANULOCYTES NFR BLD AUTO: 0.3 % (ref 0–0.5)
LYMPHOCYTES # BLD AUTO: 0.82 10*3/MM3 (ref 0.7–3.1)
LYMPHOCYTES NFR BLD AUTO: 24.6 % (ref 19.6–45.3)
MCH RBC QN AUTO: 30.5 PG (ref 26.6–33)
MCHC RBC AUTO-ENTMCNC: 32.1 G/DL (ref 31.5–35.7)
MCV RBC AUTO: 95 FL (ref 79–97)
MONOCYTES # BLD AUTO: 0.34 10*3/MM3 (ref 0.1–0.9)
MONOCYTES NFR BLD AUTO: 10.2 % (ref 5–12)
NEUTROPHILS # BLD AUTO: 2.03 10*3/MM3 (ref 1.7–7)
NEUTROPHILS NFR BLD AUTO: 60.7 % (ref 42.7–76)
NRBC BLD AUTO-RTO: 0 /100 WBC (ref 0–0.2)
PLATELET # BLD AUTO: 168 10*3/MM3 (ref 140–450)
PMV BLD AUTO: 10.8 FL (ref 6–12)
POTASSIUM BLD-SCNC: 4.4 MMOL/L (ref 3.5–5.2)
PROT SERPL-MCNC: 7.3 G/DL (ref 6–8.5)
RBC # BLD AUTO: 4.36 10*6/MM3 (ref 3.77–5.28)
SODIUM BLD-SCNC: 135 MMOL/L (ref 136–145)
WBC NRBC COR # BLD: 3.34 10*3/MM3 (ref 3.4–10.8)

## 2019-06-14 PROCEDURE — 80053 COMPREHEN METABOLIC PANEL: CPT

## 2019-06-14 PROCEDURE — 99214 OFFICE O/P EST MOD 30 MIN: CPT | Performed by: INTERNAL MEDICINE

## 2019-06-14 PROCEDURE — 0 FLUDEOXYGLUCOSE F18 SOLUTION: Performed by: INTERNAL MEDICINE

## 2019-06-14 PROCEDURE — 85025 COMPLETE CBC W/AUTO DIFF WBC: CPT

## 2019-06-14 PROCEDURE — A9552 F18 FDG: HCPCS | Performed by: INTERNAL MEDICINE

## 2019-06-14 PROCEDURE — 85007 BL SMEAR W/DIFF WBC COUNT: CPT

## 2019-06-14 PROCEDURE — 36415 COLL VENOUS BLD VENIPUNCTURE: CPT

## 2019-06-14 PROCEDURE — 82962 GLUCOSE BLOOD TEST: CPT

## 2019-06-14 PROCEDURE — 78815 PET IMAGE W/CT SKULL-THIGH: CPT

## 2019-06-14 RX ORDER — ONDANSETRON 8 MG/1
8 TABLET, ORALLY DISINTEGRATING ORAL EVERY 8 HOURS PRN
Qty: 40 TABLET | Refills: 5 | Status: SHIPPED | OUTPATIENT
Start: 2019-06-14 | End: 2020-10-28

## 2019-06-14 RX ADMIN — FLUDEOXYGLUCOSE F18 1 DOSE: 300 INJECTION INTRAVENOUS at 10:54

## 2019-06-14 NOTE — PROGRESS NOTES
"      PROBLEM LIST:  1. [id number] ER weakly positive (1-3%), OR negative, Her2 negative invasive ductal carcinoma of the left breast  A) presented with a palpable breast mass.  Biopsy showed ER+, OR- Her2- IDC, grade 3.  B) neoadjuvant TAC started July 2018.  Complicated by neutropenic fever after cycle 4.  Dose reduced to 80% for cycles 5 and 6.  C) bilateral mastectomy on 11/27/18.  Pathology showed a 1.7 cm high grade IDC with superficial invasion into the skeletal muscle.  0/2 SLN involved.  D) PET scan on 1/14/2019 showed a single focus of hypermetabolic activity along the left lateral aspect of the breast and chest wall.  On 2/19/2019 she underwent resection of this lesion which showed residual high-grade invasive ductal carcinoma with tumor extending to the deep margin.  Further resection not possible.  Adjuvant radiotherapy completed 5/10/19.    Subjective     HISTORY OF PRESENT ILLNESS:   Ina Ram returns for follow-up.  He says she is feeling well.  She went to [place] with her family to try to enjoy some of the summer before she started the next treatment.  She is scheduled to get her port removed next week.  She has received the Xeloda pills but has not started taking it yet.    Past Medical History, Past Surgical History, Social History, Family History have been reviewed and are without significant changes except as mentioned.    Review of Systems   A comprehensive 14 point review of systems was performed and was negative except as mentioned.    Medications:  The current medication list was reviewed in the EMR    ALLERGIES:  No Known Allergies    Objective      /62 Comment: RUE  Pulse 83   Temp 97.9 °F (36.6 °C) (Temporal)   Resp 16   Ht 175.3 cm (69\")   Wt 79.8 kg (176 lb)   SpO2 100% Comment: RA  BMI 25.99 kg/m²      Performance Status: 0    General: well appearing female in no acute distress  Neuro: alert and oriented  HEENT: sclera anicteric, oropharynx clear  Lymphatics: no " cervical, supraclavicular, or axillary adenopathy  Chest: Skin over the left axilla is hyperpigmented..  No palpable mass.  She does have firm scar tissue at the lateral edge of the implant.  Cardiovascular: regular rate and rhythm, no murmurs  Lungs: clear to auscultation bilaterally  Abdomen: soft, nontender, nondistended.  No palpable organomegaly  Extremeties: no lower extremity edema  Skin: no rashes, lesions, bruising, or petechiae  Psych: mood and affect appropriate    Imaging: I personally reviewed the PET scan images.  At the lateral edge of the left implant there is still some hypermetabolic activity, but improved compared to the prior scan, and no other abnormalities      Assessment/Plan   Ina Ram is a 39 y.o. year old female with a stage II B ER weakly positive HER-2 negative breast cancer who returns for follow up.  Repeat imaging shows some residual hypermetabolic activity in the area of previous abnormality, but this is only 1 month out from radiation and it is improving.  This is likely treated disease.  I will plan to repeat a PET scan in about 3 months to continue to watch this.    We will plan to begin Xeloda tomorrow.  She will do eight 3-week cycles, or about 6 months of treatment.    Follow-up in 3 weeks.      I spent 25 minutes with the patient. I spent > 50% percent of this time counseling and discussing prognosis, diagnostic testing, evaluation, current status and management.      Fifi Malloy MD  [facility]    6/14/2019          CC:

## 2019-06-14 NOTE — PROGRESS NOTES
Oral Chemotherapy Teaching      Patient Name/:  Ina Ram   1980  Oral Chemotherapy Regimen:  Capecitabine 2000mg PO BID 14 days on/7 days off  Date Started Medication: 6/15/2019    Initial Teaching Follow Up Comments     Safety     Storage instructions (away from children; away from heat/cold, sunlight, or moisture), handling - use of gloves (caregivers), washing hands after touching pills, managing waste     “How are you storing your medications?”, reminders on storage, proper handling (caregivers using gloves, washing hands, away from children, managing waste, etc.), disposal of medication with D/C or dosage change    Discussed proper storage of medication- dry, cool area.  Away from children and pets.  Discussed proper handling precautions- pt wash hands and other need to wear disposal gloves and hand wash.     Adherence      patient and/or caregiver on how to take medication, take with/without food, assess their adherence potential, stress importance of adherence, ways to manage adherence (pill boxes, phone reminders, calendars), what to do if miss a dose   “How are you taking your medication?” “How are you remembering to take your medication?”, “How many doses have you missed?”, determine reasons for non-adherence (not remembering, side effects, etc), ways to improve, overadherence? Remind patient of ways to improve/maintain adherence   Stressed importance of adherence.  Discussed what to do if she misses a pills (>6 hours, skip that dose).  Recommended using a calendar to track start and stopping of medication.  Discussed taking medication within 30 minutes of food.     Side Effects/Adverse Reactions      patient on potential side effects, s/s, ways to manage, when to call MD/seek help     Determine if patient experiencing side effects, ways to manage  Discussed most common ADRs: Diarrhea,  on hydration, mild N/V, stomatitis, fatigue, HFS.  Advised pt to have OTC Immodium on  hand if diarrhea becomes an issue.  Advised to take Zofran PRN if needed.  Advised pt to get a good moistuzier to use on hands to prevent/help with HFS.     Miscellaneous     Food interactions, DDIs, financial issues Determine if patient started any new medications since being placed on oral chemo (analyze for DDI) No DDI to discuss.  Patient already has medication and will start tomorrow.      Additional Notes:  Provided patient with phone call to call with any concerns.  Provided patient with education sheet and calendar.  Patient signed CCA and consent.

## 2019-06-21 ENCOUNTER — OFFICE VISIT (OUTPATIENT)
Dept: RADIATION ONCOLOGY | Facility: HOSPITAL | Age: 39
End: 2019-06-21

## 2019-06-21 VITALS
TEMPERATURE: 97.4 F | RESPIRATION RATE: 20 BRPM | OXYGEN SATURATION: 98 % | DIASTOLIC BLOOD PRESSURE: 75 MMHG | SYSTOLIC BLOOD PRESSURE: 118 MMHG | BODY MASS INDEX: 26.05 KG/M2 | WEIGHT: 176.4 LBS

## 2019-06-21 DIAGNOSIS — Z17.0 MALIGNANT NEOPLASM OF UPPER-OUTER QUADRANT OF LEFT BREAST IN FEMALE, ESTROGEN RECEPTOR POSITIVE (HCC): Primary | ICD-10-CM

## 2019-06-21 DIAGNOSIS — C50.412 MALIGNANT NEOPLASM OF UPPER-OUTER QUADRANT OF LEFT BREAST IN FEMALE, ESTROGEN RECEPTOR POSITIVE (HCC): Primary | ICD-10-CM

## 2019-06-21 PROCEDURE — G0463 HOSPITAL OUTPT CLINIC VISIT: HCPCS

## 2019-06-21 NOTE — PROGRESS NOTES
FOLLOW UP NOTE    PATIENT:                                                      Ina Ram  MEDICAL RECORD #:                        [id number]  :                                                          1980  COMPLETION DATE:   5/10/19  DIAGNOSIS:     Malignant neoplasm of upper-outer quadrant of left breast in female, estrogen receptor positive (CMS/HCC)  Staging form: Breast, AJCC 8th Edition  - Clinical: Stage IIB (cT2, cN0, cM0, G3, ER: Positive, VA: Negative, HER2: Negative) - Signed by Noble Blancas MD on 2018  - Pathologic: Stage IB (pT1c, pN0, cM0, G3, ER: Negative, VA: Negative, HER2: Negative) - Signed by Fifi Malloy MD on 2019      BRIEF HISTORY:    Initial follow-up after radiotherapy to the left breast/chest wall recurrence and lymphatics.  Acute posttreatment dermatitis with small areas of moist desquamation have healed completely.  She has no arm lymphedema.  Energy is improving.  Systemic treatment has been changed with addition of Xeloda 2019.  She had venous access port removal earlier today.  Recent PET/CT showed low level SUV, 2.7 max, in the left lateral reconstructed chest wall corresponding to the highest radiation field in the region of the resected local recurrence.  The previously noted nodule is no longer visible and the area of hypermetabolism is more diffuse indicating postradiation change more likely.  The previously noted second chest wall abnormality has resolved.  There are no new hypermetabolic or suspicious lesions.    MEDICATIONS: Medication reconciliation for the patient was reviewed and confirmed in the electronic medical record.    Review of Systems   All other systems reviewed and are negative.      KPS 90%    Physical Exam   Constitutional: She is oriented to person, place, and time. She appears well-developed and well-nourished.   HENT:   Head: Normocephalic and atraumatic.   Neck: Normal range of motion. Neck supple.   Cardiovascular:  Normal rate, regular rhythm and normal heart sounds.   No murmur heard.  Pulmonary/Chest: Effort normal and breath sounds normal. She has no wheezes. She has no rales. Left breast exhibits skin change ( Mild hyperpigmentation left chest wall.  Implants are soft.  Symmetric.). Left breast exhibits no mass ( Fibrotic diffuse scar left lateral reconstructed chest wall.) and no tenderness.   Abdominal: Soft. Bowel sounds are normal. She exhibits no distension. There is no hepatosplenomegaly. There is no tenderness.   Musculoskeletal: Normal range of motion. She exhibits no edema or tenderness.   Lymphadenopathy:     She has no cervical adenopathy.     She has no axillary adenopathy.        Right: No supraclavicular adenopathy present.        Left: No supraclavicular adenopathy present.   Neurological: She is alert and oriented to person, place, and time. She has normal strength. No sensory deficit.   Skin: Skin is warm and dry.   Psychiatric: She has a normal mood and affect. Her behavior is normal. Judgment and thought content normal.   Nursing note and vitals reviewed.      VITAL SIGNS:   Vitals:    06/21/19 0848   BP: 118/75   Resp: 20   Temp: 97.4 °F (36.3 °C)   TempSrc: Temporal   SpO2: 98%   Weight: 80 kg (176 lb 6.4 oz)   PainSc: 0-No pain       The following portions of the patient's history were reviewed and updated as appropriate: allergies, current medications, past family history, past medical history, past social history, past surgical history and problem list.         Ina was seen today for breast cancer.    Diagnoses and all orders for this visit:    Malignant neoplasm of upper-outer quadrant of left breast in female, estrogen receptor positive (CMS/HCC)         IMPRESSION: Locally recurrent left breast cancer.  She tolerated radiotherapy to the left chest wall and lymphatics very well.  Skin is healed.  No evidence of persistent or recurrent neoplasm on examination.  Recent PET/CT is most consistent  with postradiation change on the lateral reconstructed breast in the area of previous recurrence.    RECOMMENDATIONS: She will continue systemic treatment and monitoring under the care of Dr. Malloy.  I will be happy to see her as needed.    Return if symptoms worsen or fail to improve.    Ramy Bales MD    Errors in dictation may reflect use of voice recognition software and not all errors in transcription may have been detected prior to signing.

## 2019-07-10 ENCOUNTER — OFFICE VISIT (OUTPATIENT)
Dept: ONCOLOGY | Facility: CLINIC | Age: 39
End: 2019-07-10

## 2019-07-10 ENCOUNTER — APPOINTMENT (OUTPATIENT)
Dept: LAB | Facility: HOSPITAL | Age: 39
End: 2019-07-10

## 2019-07-10 VITALS
HEART RATE: 78 BPM | SYSTOLIC BLOOD PRESSURE: 108 MMHG | RESPIRATION RATE: 12 BRPM | TEMPERATURE: 97.6 F | BODY MASS INDEX: 26.22 KG/M2 | HEIGHT: 69 IN | OXYGEN SATURATION: 99 % | WEIGHT: 177 LBS | DIASTOLIC BLOOD PRESSURE: 71 MMHG

## 2019-07-10 DIAGNOSIS — C50.412 MALIGNANT NEOPLASM OF UPPER-OUTER QUADRANT OF LEFT BREAST IN FEMALE, ESTROGEN RECEPTOR POSITIVE (HCC): Primary | ICD-10-CM

## 2019-07-10 DIAGNOSIS — Z17.0 MALIGNANT NEOPLASM OF UPPER-OUTER QUADRANT OF LEFT BREAST IN FEMALE, ESTROGEN RECEPTOR POSITIVE (HCC): Primary | ICD-10-CM

## 2019-07-10 LAB
ALBUMIN SERPL-MCNC: 4.5 G/DL (ref 3.5–5.2)
ALBUMIN/GLOB SERPL: 1.7 G/DL
ALP SERPL-CCNC: 73 U/L (ref 39–117)
ALT SERPL W P-5'-P-CCNC: 29 U/L (ref 1–33)
ANION GAP SERPL CALCULATED.3IONS-SCNC: 13 MMOL/L (ref 5–15)
AST SERPL-CCNC: 28 U/L (ref 1–32)
BILIRUB SERPL-MCNC: 0.6 MG/DL (ref 0.2–1.2)
BUN BLD-MCNC: 7 MG/DL (ref 6–20)
BUN/CREAT SERPL: 11.5 (ref 7–25)
CALCIUM SPEC-SCNC: 9.7 MG/DL (ref 8.6–10.5)
CHLORIDE SERPL-SCNC: 103 MMOL/L (ref 98–107)
CO2 SERPL-SCNC: 24 MMOL/L (ref 22–29)
CREAT BLD-MCNC: 0.61 MG/DL (ref 0.57–1)
ERYTHROCYTE [DISTWIDTH] IN BLOOD BY AUTOMATED COUNT: 18.2 % (ref 12.3–15.4)
GFR SERPL CREATININE-BSD FRML MDRD: 109 ML/MIN/1.73
GLOBULIN UR ELPH-MCNC: 2.7 GM/DL
GLUCOSE BLD-MCNC: 89 MG/DL (ref 65–99)
HCT VFR BLD AUTO: 36.5 % (ref 34–46.6)
HGB BLD-MCNC: 12.9 G/DL (ref 12–15.9)
LYMPHOCYTES # BLD AUTO: 1.3 10*3/MM3 (ref 0.7–3.1)
LYMPHOCYTES NFR BLD AUTO: 31.4 % (ref 19.6–45.3)
MCH RBC QN AUTO: 33.5 PG (ref 26.6–33)
MCHC RBC AUTO-ENTMCNC: 35.3 G/DL (ref 31.5–35.7)
MCV RBC AUTO: 94.9 FL (ref 79–97)
MONOCYTES # BLD AUTO: 0.3 10*3/MM3 (ref 0.1–0.9)
MONOCYTES NFR BLD AUTO: 6.8 % (ref 5–12)
NEUTROPHILS # BLD AUTO: 2.6 10*3/MM3 (ref 1.7–7)
NEUTROPHILS NFR BLD AUTO: 61.8 % (ref 42.7–76)
PLATELET # BLD AUTO: 175 10*3/MM3 (ref 140–450)
PMV BLD AUTO: 7.1 FL (ref 6–12)
POTASSIUM BLD-SCNC: 4.4 MMOL/L (ref 3.5–5.2)
PROT SERPL-MCNC: 7.2 G/DL (ref 6–8.5)
RBC # BLD AUTO: 3.84 10*6/MM3 (ref 3.77–5.28)
SODIUM BLD-SCNC: 140 MMOL/L (ref 136–145)
WBC NRBC COR # BLD: 4.2 10*3/MM3 (ref 3.4–10.8)

## 2019-07-10 PROCEDURE — 80053 COMPREHEN METABOLIC PANEL: CPT | Performed by: NURSE PRACTITIONER

## 2019-07-10 PROCEDURE — 36415 COLL VENOUS BLD VENIPUNCTURE: CPT | Performed by: NURSE PRACTITIONER

## 2019-07-10 PROCEDURE — 99213 OFFICE O/P EST LOW 20 MIN: CPT | Performed by: NURSE PRACTITIONER

## 2019-07-10 PROCEDURE — 85025 COMPLETE CBC W/AUTO DIFF WBC: CPT | Performed by: NURSE PRACTITIONER

## 2019-07-10 NOTE — PROGRESS NOTES
PROBLEM LIST:  1. [id number] ER weakly positive (1-3%), CT negative, Her2 negative invasive ductal carcinoma of the left breast  A) presented with a palpable breast mass.  Biopsy showed ER+, CT- Her2- IDC, grade 3.  B) neoadjuvant TAC started July 2018.  Complicated by neutropenic fever after cycle 4.  Dose reduced to 80% for cycles 5 and 6.  C) bilateral mastectomy on 11/27/18.  Pathology showed a 1.7 cm high grade IDC with superficial invasion into the skeletal muscle.  0/2 SLN involved.  D) PET scan on 1/14/2019 showed a single focus of hypermetabolic activity along the left lateral aspect of the breast and chest wall.  On 2/19/2019 she underwent resection of this lesion which showed residual high-grade invasive ductal carcinoma with tumor extending to the deep margin.  Further resection not possible.  Adjuvant radiotherapy completed 5/10/19.  E) 6/15/2019 she started adjuvant Xeloda.       Subjective      Chief Complaint: follow up breast cancer     HISTORY OF PRESENT ILLNESS:   Ina Ram returns for follow-up. 6/15/2019 she started cycle 1 of Xeloda 2000 mg po BID days 1-14 followed by 7 days off. She started cycle 2  7/7/2019. She is tolerating the medication well. She had a few days of nausea that improved with Zofran. She also had a few isolated episodes of diarrhea and did not require imodium. She has had mild fatigue. She had her port removed. Overall she is feeling good. She denies any almazan planter erythema or desquamation.     Past Medical History, Past Surgical History, Social History, Family History have been reviewed and are without significant changes except as mentioned.    Review of Systems   A comprehensive 14 point review of systems was performed and was negative except as mentioned.    Medications:  The current medication list was reviewed in the EMR    ALLERGIES:  No Known Allergies    Objective      /71   Pulse 78   Temp 97.6 °F (36.4 °C) (Temporal)   Resp 12   Ht 175.3  "cm (69\")   Wt 80.3 kg (177 lb)   SpO2 99%   BMI 26.14 kg/m²      Performance Status: 0    General: well appearing female in no acute distress  Neuro: alert and oriented  HEENT: sclera anicteric, oropharynx clear  Lymphatics: no cervical, supraclavicular, or axillary adenopathy  Chest: Skin over the left axilla is hyperpigmented..  No palpable mass.  She does have firm scar tissue at the lateral edge of the implant.  Cardiovascular: regular rate and rhythm, no murmurs  Lungs: clear to auscultation bilaterally  Abdomen: soft, nontender, nondistended.  No palpable organomegaly  Extremeties: no lower extremity edema  Skin: no rashes, lesions, bruising, or petechiae  Psych: mood and affect appropriate          Assessment/Plan   Ina Ram is a 39 y.o. year old female with a stage II B ER weakly positive HER-2 negative breast cancer who returns for follow up.       Continue Xeloda 2000 mg po BID days 1-14 followed by 7 days off. She started cycle 2  7/7/2019.  She will do eight 3-week cycles, or about 6 months of treatment.     She will need a repeat PET scan in the the middle of September to follow up on decreasing activity in the small hypermetabolic focus previously noted in the lateral subcutaneous tissues, adjacent patient's left breast implant.    Follow-up in 3 weeks.      I spent 15 minutes with the patient. I spent > 50% percent of this time counseling and discussing prognosis, diagnostic testing, evaluation, current status and management.      Kendra Weiner APRN  [facility]    7/10/2019          CC:          "

## 2019-08-14 ENCOUNTER — LAB (OUTPATIENT)
Dept: LAB | Facility: HOSPITAL | Age: 39
End: 2019-08-14

## 2019-08-14 ENCOUNTER — OFFICE VISIT (OUTPATIENT)
Dept: ONCOLOGY | Facility: CLINIC | Age: 39
End: 2019-08-14

## 2019-08-14 VITALS
HEIGHT: 69 IN | TEMPERATURE: 97.1 F | OXYGEN SATURATION: 99 % | WEIGHT: 174 LBS | BODY MASS INDEX: 25.77 KG/M2 | SYSTOLIC BLOOD PRESSURE: 108 MMHG | DIASTOLIC BLOOD PRESSURE: 70 MMHG | RESPIRATION RATE: 12 BRPM | HEART RATE: 73 BPM

## 2019-08-14 DIAGNOSIS — Z17.0 MALIGNANT NEOPLASM OF UPPER-OUTER QUADRANT OF LEFT BREAST IN FEMALE, ESTROGEN RECEPTOR POSITIVE (HCC): Primary | ICD-10-CM

## 2019-08-14 DIAGNOSIS — Z17.0 MALIGNANT NEOPLASM OF UPPER-OUTER QUADRANT OF LEFT BREAST IN FEMALE, ESTROGEN RECEPTOR POSITIVE (HCC): ICD-10-CM

## 2019-08-14 DIAGNOSIS — C50.412 MALIGNANT NEOPLASM OF UPPER-OUTER QUADRANT OF LEFT BREAST IN FEMALE, ESTROGEN RECEPTOR POSITIVE (HCC): ICD-10-CM

## 2019-08-14 DIAGNOSIS — C50.412 MALIGNANT NEOPLASM OF UPPER-OUTER QUADRANT OF LEFT BREAST IN FEMALE, ESTROGEN RECEPTOR POSITIVE (HCC): Primary | ICD-10-CM

## 2019-08-14 LAB
ALBUMIN SERPL-MCNC: 4.5 G/DL (ref 3.5–5.2)
ALBUMIN/GLOB SERPL: 2 G/DL
ALP SERPL-CCNC: 78 U/L (ref 39–117)
ALT SERPL W P-5'-P-CCNC: 22 U/L (ref 1–33)
ANION GAP SERPL CALCULATED.3IONS-SCNC: 10 MMOL/L (ref 5–15)
AST SERPL-CCNC: 21 U/L (ref 1–32)
BILIRUB SERPL-MCNC: 0.5 MG/DL (ref 0.2–1.2)
BUN BLD-MCNC: 8 MG/DL (ref 6–20)
BUN/CREAT SERPL: 14.5 (ref 7–25)
CALCIUM SPEC-SCNC: 9.3 MG/DL (ref 8.6–10.5)
CHLORIDE SERPL-SCNC: 104 MMOL/L (ref 98–107)
CO2 SERPL-SCNC: 26 MMOL/L (ref 22–29)
CREAT BLD-MCNC: 0.55 MG/DL (ref 0.57–1)
ERYTHROCYTE [DISTWIDTH] IN BLOOD BY AUTOMATED COUNT: 19.6 % (ref 12.3–15.4)
GFR SERPL CREATININE-BSD FRML MDRD: 123 ML/MIN/1.73
GLOBULIN UR ELPH-MCNC: 2.3 GM/DL
GLUCOSE BLD-MCNC: 78 MG/DL (ref 65–99)
HCT VFR BLD AUTO: 37 % (ref 34–46.6)
HGB BLD-MCNC: 12.3 G/DL (ref 12–15.9)
LYMPHOCYTES # BLD AUTO: 1.3 10*3/MM3 (ref 0.7–3.1)
LYMPHOCYTES NFR BLD AUTO: 23.8 % (ref 19.6–45.3)
MCH RBC QN AUTO: 33.3 PG (ref 26.6–33)
MCHC RBC AUTO-ENTMCNC: 33.3 G/DL (ref 31.5–35.7)
MCV RBC AUTO: 99.9 FL (ref 79–97)
MONOCYTES # BLD AUTO: 0.4 10*3/MM3 (ref 0.1–0.9)
MONOCYTES NFR BLD AUTO: 6.8 % (ref 5–12)
NEUTROPHILS # BLD AUTO: 3.9 10*3/MM3 (ref 1.7–7)
NEUTROPHILS NFR BLD AUTO: 69.4 % (ref 42.7–76)
PLATELET # BLD AUTO: 209 10*3/MM3 (ref 140–450)
PMV BLD AUTO: 6.6 FL (ref 6–12)
POTASSIUM BLD-SCNC: 4.2 MMOL/L (ref 3.5–5.2)
PROT SERPL-MCNC: 6.8 G/DL (ref 6–8.5)
RBC # BLD AUTO: 3.7 10*6/MM3 (ref 3.77–5.28)
SODIUM BLD-SCNC: 140 MMOL/L (ref 136–145)
WBC NRBC COR # BLD: 5.6 10*3/MM3 (ref 3.4–10.8)

## 2019-08-14 PROCEDURE — 36415 COLL VENOUS BLD VENIPUNCTURE: CPT

## 2019-08-14 PROCEDURE — 80053 COMPREHEN METABOLIC PANEL: CPT

## 2019-08-14 PROCEDURE — 85025 COMPLETE CBC W/AUTO DIFF WBC: CPT

## 2019-08-14 PROCEDURE — 99214 OFFICE O/P EST MOD 30 MIN: CPT | Performed by: NURSE PRACTITIONER

## 2019-08-14 RX ORDER — AMOXICILLIN AND CLAVULANATE POTASSIUM 875; 125 MG/1; MG/1
1 TABLET, FILM COATED ORAL 2 TIMES DAILY
Qty: 20 TABLET | Refills: 0 | Status: SHIPPED | OUTPATIENT
Start: 2019-08-14 | End: 2019-09-12

## 2019-08-14 NOTE — PROGRESS NOTES
"      PROBLEM LIST:  1. [id number] ER weakly positive (1-3%), MT negative, Her2 negative invasive ductal carcinoma of the left breast  A) presented with a palpable breast mass.  Biopsy showed ER+, MT- Her2- IDC, grade 3.  B) neoadjuvant TAC started July 2018.  Complicated by neutropenic fever after cycle 4.  Dose reduced to 80% for cycles 5 and 6.  C) bilateral mastectomy on 11/27/18.  Pathology showed a 1.7 cm high grade IDC with superficial invasion into the skeletal muscle.  0/2 SLN involved.  D) PET scan on 1/14/2019 showed a single focus of hypermetabolic activity along the left lateral aspect of the breast and chest wall.  On 2/19/2019 she underwent resection of this lesion which showed residual high-grade invasive ductal carcinoma with tumor extending to the deep margin.  Further resection not possible.  Adjuvant radiotherapy completed 5/10/19.  E) 6/15/2019 she started adjuvant Xeloda.       Subjective      Chief Complaint: follow up breast cancer     HISTORY OF PRESENT ILLNESS:   Ina Ram returns for follow-up. 6/15/2019 she started cycle 1 of Xeloda 2000 mg po BID days 1-14 followed by 7 days off. She started cycle 3  7/30/2019. She is tolerating the medication well. She had a few isolated episodes of diarrhea and did not require imodium. She has mild fatigue. She denies any almazan planter erythema or desquamation. Since 8/3/2019 she has had a productive cough with nasal drainage and overall feeling \"sick\". She says the nasal drainage started out clear but since 4 days ago she has developed dark green sputum and nasal drainage. She denies any fevers or chills. No dyspnea or wheezing.        Past Medical History, Past Surgical History, Social History, Family History have been reviewed and are without significant changes except as mentioned.    Review of Systems   A comprehensive 14 point review of systems was performed and was negative except as mentioned.    Medications:  The current medication list " "was reviewed in the EMR    ALLERGIES:  No Known Allergies    Objective      /70   Pulse 73   Temp 97.1 °F (36.2 °C) (Temporal)   Resp 12   Ht 175.3 cm (69\")   Wt 78.9 kg (174 lb)   SpO2 99%   BMI 25.70 kg/m²      Performance Status: 0    General: well appearing female in no acute distress  Neuro: alert and oriented  HEENT: sclera anicteric, oropharynx clear  Lymphatics: no cervical, supraclavicular, or axillary adenopathy  Chest: Skin over the left axilla is hyperpigmented..  No palpable mass.  She does have firm scar tissue at the lateral edge of the implant.  Cardiovascular: regular rate and rhythm, no murmurs  Lungs: clear to auscultation bilaterally  Abdomen: soft, nontender, nondistended.  No palpable organomegaly  Extremeties: no lower extremity edema  Skin: no rashes, lesions, bruising, or petechiae  Psych: mood and affect appropriate          Assessment/Plan   Ina Ram is a 39 y.o. year old female with a stage II B ER weakly positive HER-2 negative breast cancer who returns for follow up.       Continue Xeloda 2000 mg po BID days 1-14 followed by 7 days off. She will start cycle 4  08/20/2019.  She will do eight 3-week cycles, or about 6 months of treatment.     She will need a repeat PET scan in the the middle of September to follow up on decreasing activity in the small hypermetabolic focus previously noted in the lateral subcutaneous tissues, adjacent patient's left breast implant. This will need ordered at her next visit.     Augmentin 875 mg po BID x 10 days for sinusitis.     Follow-up in 3 weeks.      I spent 30 minutes with the patient. I spent > 50% percent of this time counseling and discussing prognosis, diagnostic testing, evaluation, current status and management.      Kendra Weiner APRN  [facility]ology [facility] On[facility]    8/14/2019          CC:          "

## 2019-09-09 DIAGNOSIS — C50.412 MALIGNANT NEOPLASM OF UPPER-OUTER QUADRANT OF LEFT BREAST IN FEMALE, ESTROGEN RECEPTOR POSITIVE (HCC): Primary | ICD-10-CM

## 2019-09-09 DIAGNOSIS — Z17.0 MALIGNANT NEOPLASM OF UPPER-OUTER QUADRANT OF LEFT BREAST IN FEMALE, ESTROGEN RECEPTOR POSITIVE (HCC): Primary | ICD-10-CM

## 2019-09-12 ENCOUNTER — OFFICE VISIT (OUTPATIENT)
Dept: ONCOLOGY | Facility: CLINIC | Age: 39
End: 2019-09-12

## 2019-09-12 ENCOUNTER — LAB (OUTPATIENT)
Dept: LAB | Facility: HOSPITAL | Age: 39
End: 2019-09-12

## 2019-09-12 VITALS
DIASTOLIC BLOOD PRESSURE: 73 MMHG | TEMPERATURE: 97.9 F | WEIGHT: 174 LBS | OXYGEN SATURATION: 99 % | BODY MASS INDEX: 25.77 KG/M2 | SYSTOLIC BLOOD PRESSURE: 117 MMHG | HEART RATE: 83 BPM | RESPIRATION RATE: 12 BRPM | HEIGHT: 69 IN

## 2019-09-12 DIAGNOSIS — Z17.0 MALIGNANT NEOPLASM OF UPPER-OUTER QUADRANT OF LEFT BREAST IN FEMALE, ESTROGEN RECEPTOR POSITIVE (HCC): ICD-10-CM

## 2019-09-12 DIAGNOSIS — C50.412 MALIGNANT NEOPLASM OF UPPER-OUTER QUADRANT OF LEFT BREAST IN FEMALE, ESTROGEN RECEPTOR POSITIVE (HCC): Primary | ICD-10-CM

## 2019-09-12 DIAGNOSIS — C50.412 MALIGNANT NEOPLASM OF UPPER-OUTER QUADRANT OF LEFT BREAST IN FEMALE, ESTROGEN RECEPTOR POSITIVE (HCC): ICD-10-CM

## 2019-09-12 DIAGNOSIS — Z17.0 MALIGNANT NEOPLASM OF UPPER-OUTER QUADRANT OF LEFT BREAST IN FEMALE, ESTROGEN RECEPTOR POSITIVE (HCC): Primary | ICD-10-CM

## 2019-09-12 LAB
ALBUMIN SERPL-MCNC: 4.8 G/DL (ref 3.5–5.2)
ALBUMIN/GLOB SERPL: 2.8 G/DL
ALP SERPL-CCNC: 73 U/L (ref 39–117)
ALT SERPL W P-5'-P-CCNC: 14 U/L (ref 1–33)
ANION GAP SERPL CALCULATED.3IONS-SCNC: 10 MMOL/L (ref 5–15)
AST SERPL-CCNC: 13 U/L (ref 1–32)
BILIRUB SERPL-MCNC: 0.4 MG/DL (ref 0.2–1.2)
BUN BLD-MCNC: 9 MG/DL (ref 6–20)
BUN/CREAT SERPL: 14.8 (ref 7–25)
CALCIUM SPEC-SCNC: 9 MG/DL (ref 8.6–10.5)
CHLORIDE SERPL-SCNC: 102 MMOL/L (ref 98–107)
CO2 SERPL-SCNC: 28 MMOL/L (ref 22–29)
CREAT BLD-MCNC: 0.61 MG/DL (ref 0.57–1)
ERYTHROCYTE [DISTWIDTH] IN BLOOD BY AUTOMATED COUNT: 21.1 % (ref 12.3–15.4)
GFR SERPL CREATININE-BSD FRML MDRD: 109 ML/MIN/1.73
GLOBULIN UR ELPH-MCNC: 1.7 GM/DL
GLUCOSE BLD-MCNC: 87 MG/DL (ref 65–99)
HCT VFR BLD AUTO: 34.5 % (ref 34–46.6)
HGB BLD-MCNC: 11.7 G/DL (ref 12–15.9)
LYMPHOCYTES # BLD AUTO: 1.2 10*3/MM3 (ref 0.7–3.1)
LYMPHOCYTES NFR BLD AUTO: 17 % (ref 19.6–45.3)
MCH RBC QN AUTO: 35.8 PG (ref 26.6–33)
MCHC RBC AUTO-ENTMCNC: 33.9 G/DL (ref 31.5–35.7)
MCV RBC AUTO: 105.6 FL (ref 79–97)
MONOCYTES # BLD AUTO: 0.3 10*3/MM3 (ref 0.1–0.9)
MONOCYTES NFR BLD AUTO: 4.1 % (ref 5–12)
NEUTROPHILS # BLD AUTO: 5.7 10*3/MM3 (ref 1.7–7)
NEUTROPHILS NFR BLD AUTO: 78.9 % (ref 42.7–76)
PLATELET # BLD AUTO: 180 10*3/MM3 (ref 140–450)
PMV BLD AUTO: 6.8 FL (ref 6–12)
POTASSIUM BLD-SCNC: 3.9 MMOL/L (ref 3.5–5.2)
PROT SERPL-MCNC: 6.5 G/DL (ref 6–8.5)
RBC # BLD AUTO: 3.27 10*6/MM3 (ref 3.77–5.28)
SODIUM BLD-SCNC: 140 MMOL/L (ref 136–145)
WBC NRBC COR # BLD: 7.2 10*3/MM3 (ref 3.4–10.8)

## 2019-09-12 PROCEDURE — 80053 COMPREHEN METABOLIC PANEL: CPT

## 2019-09-12 PROCEDURE — 99213 OFFICE O/P EST LOW 20 MIN: CPT | Performed by: NURSE PRACTITIONER

## 2019-09-12 PROCEDURE — 85025 COMPLETE CBC W/AUTO DIFF WBC: CPT

## 2019-09-12 PROCEDURE — 36415 COLL VENOUS BLD VENIPUNCTURE: CPT

## 2019-09-12 NOTE — PROGRESS NOTES
"      PROBLEM LIST:  1. [id number] ER weakly positive (1-3%), WI negative, Her2 negative invasive ductal carcinoma of the left breast  A) presented with a palpable breast mass.  Biopsy showed ER+, WI- Her2- IDC, grade 3.  B) neoadjuvant TAC started July 2018.  Complicated by neutropenic fever after cycle 4.  Dose reduced to 80% for cycles 5 and 6.  C) bilateral mastectomy on 11/27/18.  Pathology showed a 1.7 cm high grade IDC with superficial invasion into the skeletal muscle.  0/2 SLN involved.  D) PET scan on 1/14/2019 showed a single focus of hypermetabolic activity along the left lateral aspect of the breast and chest wall.  On 2/19/2019 she underwent resection of this lesion which showed residual high-grade invasive ductal carcinoma with tumor extending to the deep margin.  Further resection not possible.  Adjuvant radiotherapy completed 5/10/19.  E) 6/15/2019 she started adjuvant Xeloda.       Subjective      Chief Complaint: follow up breast cancer     HISTORY OF PRESENT ILLNESS:   Ina Ram returns for follow-up. 6/15/2019 she started cycle 1 of Xeloda 2000 mg po BID days 1-14 followed by 7 days off. She had a few days of nausea and vomiting during her last cycle and she delayed her treatment. She completed her 14 days of Xeloda (Cycle 4) 9/8/2019 and will be due to start cycle 5 on 9/15/2019.  She is tolerating the medication well. She had a few isolated episodes of diarrhea and did not require imodium. She has mild fatigue. She denies any almazan/ planter desquamation.  She did have some mild almazan erythema and soreness.  She complains of a \"catch\" in her left side for the past week. She denies pain in the area, no complaints with cough or dyspnea.     Past Medical History, Past Surgical History, Social History, Family History have been reviewed and are without significant changes except as mentioned.    Review of Systems   A comprehensive 14 point review of systems was performed and was negative " "except as mentioned.    Medications:  The current medication list was reviewed in the EMR    ALLERGIES:  No Known Allergies    Objective      /73   Pulse 83   Temp 97.9 °F (36.6 °C) (Temporal)   Resp 12   Ht 175.3 cm (69\")   Wt 78.9 kg (174 lb)   SpO2 99%   BMI 25.70 kg/m²      Performance Status: 0    General: well appearing female in no acute distress  Neuro: alert and oriented  HEENT: sclera anicteric, oropharynx clear  Lymphatics: no cervical, supraclavicular, or axillary adenopathy  Chest: Skin over the left axilla is hyperpigmented..  No palpable mass.  She does have firm scar tissue at the lateral edge of the implant.  Cardiovascular: regular rate and rhythm, no murmurs  Lungs: clear to auscultation bilaterally  Abdomen: soft, nontender, nondistended.  No palpable organomegaly  Extremeties: no lower extremity edema  Skin: no rashes, lesions, bruising, or petechiae  Psych: mood and affect appropriate          Assessment/Plan   Ina Ram is a 39 y.o. year old female with a stage II B ER weakly positive HER-2 negative breast cancer who returns for follow up.       Continue Xeloda 2000 mg po BID days 1-14 followed by 7 days off. She will start cycle 5  09/15/2019.   She will do eight 3-week cycles, or about 6 months of treatment.     She will need a repeat PET scan prior to return to follow up on decreasing activity in the small hypermetabolic focus previously noted in the lateral subcutaneous tissues, adjacent patient's left breast implant.     Follow-up in 3 weeks.      I spent 30 minutes with the patient. I spent > 50% percent of this time counseling and discussing prognosis, diagnostic testing, evaluation, current status and management.      Kendra Weiner, SHIVA  [facility]ology [facility] On[facility]    9/12/2019          CC:          "

## 2019-10-02 ENCOUNTER — HOSPITAL ENCOUNTER (OUTPATIENT)
Dept: PET IMAGING | Facility: HOSPITAL | Age: 39
Discharge: HOME | End: 2019-10-02

## 2019-10-02 ENCOUNTER — LAB (OUTPATIENT)
Dept: LAB | Facility: HOSPITAL | Age: 39
End: 2019-10-02

## 2019-10-02 ENCOUNTER — HOSPITAL ENCOUNTER (OUTPATIENT)
Dept: PET IMAGING | Facility: HOSPITAL | Age: 39
Discharge: HOME | End: 2019-10-02
Admitting: NURSE PRACTITIONER

## 2019-10-02 ENCOUNTER — OFFICE VISIT (OUTPATIENT)
Dept: ONCOLOGY | Facility: CLINIC | Age: 39
End: 2019-10-02

## 2019-10-02 VITALS
RESPIRATION RATE: 12 BRPM | DIASTOLIC BLOOD PRESSURE: 71 MMHG | WEIGHT: 178 LBS | TEMPERATURE: 98.1 F | OXYGEN SATURATION: 97 % | HEIGHT: 69 IN | HEART RATE: 92 BPM | BODY MASS INDEX: 26.36 KG/M2 | SYSTOLIC BLOOD PRESSURE: 121 MMHG

## 2019-10-02 DIAGNOSIS — Z17.0 MALIGNANT NEOPLASM OF UPPER-OUTER QUADRANT OF LEFT BREAST IN FEMALE, ESTROGEN RECEPTOR POSITIVE (HCC): ICD-10-CM

## 2019-10-02 DIAGNOSIS — R94.8 ABNORMAL POSITRON EMISSION TOMOGRAPHY (PET) SCAN: ICD-10-CM

## 2019-10-02 DIAGNOSIS — C50.412 MALIGNANT NEOPLASM OF UPPER-OUTER QUADRANT OF LEFT BREAST IN FEMALE, ESTROGEN RECEPTOR POSITIVE (HCC): Primary | ICD-10-CM

## 2019-10-02 DIAGNOSIS — Z17.0 MALIGNANT NEOPLASM OF UPPER-OUTER QUADRANT OF LEFT BREAST IN FEMALE, ESTROGEN RECEPTOR POSITIVE (HCC): Primary | ICD-10-CM

## 2019-10-02 DIAGNOSIS — C50.412 MALIGNANT NEOPLASM OF UPPER-OUTER QUADRANT OF LEFT BREAST IN FEMALE, ESTROGEN RECEPTOR POSITIVE (HCC): ICD-10-CM

## 2019-10-02 LAB
ALBUMIN SERPL-MCNC: 4.6 G/DL (ref 3.5–5.2)
ALBUMIN/GLOB SERPL: 2 G/DL
ALP SERPL-CCNC: 70 U/L (ref 39–117)
ALT SERPL W P-5'-P-CCNC: 22 U/L (ref 1–33)
ANION GAP SERPL CALCULATED.3IONS-SCNC: 9 MMOL/L (ref 5–15)
AST SERPL-CCNC: 21 U/L (ref 1–32)
BASOPHILS # BLD AUTO: 0.03 10*3/MM3 (ref 0–0.2)
BASOPHILS NFR BLD AUTO: 0.7 % (ref 0–1.5)
BILIRUB SERPL-MCNC: 0.7 MG/DL (ref 0.2–1.2)
BUN BLD-MCNC: 9 MG/DL (ref 6–20)
BUN/CREAT SERPL: 14.8 (ref 7–25)
CALCIUM SPEC-SCNC: 9.1 MG/DL (ref 8.6–10.5)
CHLORIDE SERPL-SCNC: 104 MMOL/L (ref 98–107)
CO2 SERPL-SCNC: 25 MMOL/L (ref 22–29)
CREAT BLD-MCNC: 0.61 MG/DL (ref 0.57–1)
DEPRECATED RDW RBC AUTO: 58.3 FL (ref 37–54)
EOSINOPHIL # BLD AUTO: 0.15 10*3/MM3 (ref 0–0.4)
EOSINOPHIL NFR BLD AUTO: 3.4 % (ref 0.3–6.2)
ERYTHROCYTE [DISTWIDTH] IN BLOOD BY AUTOMATED COUNT: 15.3 % (ref 12.3–15.4)
GFR SERPL CREATININE-BSD FRML MDRD: 109 ML/MIN/1.73
GLOBULIN UR ELPH-MCNC: 2.3 GM/DL
GLUCOSE BLD-MCNC: 102 MG/DL (ref 65–99)
GLUCOSE BLDC GLUCOMTR-MCNC: 100 MG/DL (ref 70–130)
HCT VFR BLD AUTO: 34.5 % (ref 34–46.6)
HGB BLD-MCNC: 11.7 G/DL (ref 12–15.9)
IMM GRANULOCYTES # BLD AUTO: 0.01 10*3/MM3 (ref 0–0.05)
IMM GRANULOCYTES NFR BLD AUTO: 0.2 % (ref 0–0.5)
LYMPHOCYTES # BLD AUTO: 0.94 10*3/MM3 (ref 0.7–3.1)
LYMPHOCYTES NFR BLD AUTO: 21.6 % (ref 19.6–45.3)
MCH RBC QN AUTO: 35.7 PG (ref 26.6–33)
MCHC RBC AUTO-ENTMCNC: 33.9 G/DL (ref 31.5–35.7)
MCV RBC AUTO: 105.2 FL (ref 79–97)
MONOCYTES # BLD AUTO: 0.36 10*3/MM3 (ref 0.1–0.9)
MONOCYTES NFR BLD AUTO: 8.3 % (ref 5–12)
NEUTROPHILS # BLD AUTO: 2.87 10*3/MM3 (ref 1.7–7)
NEUTROPHILS NFR BLD AUTO: 65.8 % (ref 42.7–76)
NRBC BLD AUTO-RTO: 0 /100 WBC (ref 0–0.2)
PLATELET # BLD AUTO: 192 10*3/MM3 (ref 140–450)
PMV BLD AUTO: 9.7 FL (ref 6–12)
POTASSIUM BLD-SCNC: 4.3 MMOL/L (ref 3.5–5.2)
PROT SERPL-MCNC: 6.9 G/DL (ref 6–8.5)
RBC # BLD AUTO: 3.28 10*6/MM3 (ref 3.77–5.28)
SODIUM BLD-SCNC: 138 MMOL/L (ref 136–145)
WBC NRBC COR # BLD: 4.36 10*3/MM3 (ref 3.4–10.8)

## 2019-10-02 PROCEDURE — 36415 COLL VENOUS BLD VENIPUNCTURE: CPT

## 2019-10-02 PROCEDURE — 78815 PET IMAGE W/CT SKULL-THIGH: CPT

## 2019-10-02 PROCEDURE — 85025 COMPLETE CBC W/AUTO DIFF WBC: CPT

## 2019-10-02 PROCEDURE — A9552 F18 FDG: HCPCS | Performed by: NURSE PRACTITIONER

## 2019-10-02 PROCEDURE — 99214 OFFICE O/P EST MOD 30 MIN: CPT | Performed by: NURSE PRACTITIONER

## 2019-10-02 PROCEDURE — 80053 COMPREHEN METABOLIC PANEL: CPT

## 2019-10-02 PROCEDURE — 82962 GLUCOSE BLOOD TEST: CPT

## 2019-10-02 PROCEDURE — 0 FLUDEOXYGLUCOSE F18 SOLUTION: Performed by: NURSE PRACTITIONER

## 2019-10-02 RX ADMIN — FLUDEOXYGLUCOSE F18 1 DOSE: 300 INJECTION INTRAVENOUS at 08:51

## 2019-10-02 NOTE — PROGRESS NOTES
"      PROBLEM LIST:  1. [id number] ER weakly positive (1-3%), DE negative, Her2 negative invasive ductal carcinoma of the left breast  A) presented with a palpable breast mass.  Biopsy showed ER+, DE- Her2- IDC, grade 3.  B) neoadjuvant TAC started July 2018.  Complicated by neutropenic fever after cycle 4.  Dose reduced to 80% for cycles 5 and 6.  C) bilateral mastectomy on 11/27/18.  Pathology showed a 1.7 cm high grade IDC with superficial invasion into the skeletal muscle.  0/2 SLN involved.  D) PET scan on 1/14/2019 showed a single focus of hypermetabolic activity along the left lateral aspect of the breast and chest wall.  On 2/19/2019 she underwent resection of this lesion which showed residual high-grade invasive ductal carcinoma with tumor extending to the deep margin.  Further resection not possible.  Adjuvant radiotherapy completed 5/10/19.  E) 6/15/2019 she started adjuvant Xeloda.       Subjective      Chief Complaint: follow up breast cancer     HISTORY OF PRESENT ILLNESS:   Ina Ram returns for follow-up. 6/15/2019 she started cycle 1 of Xeloda 2000 mg po BID days 1-14 followed by 7 days off. She had a few days of nausea and vomiting during her last cycle and she delayed her treatment. She completed her 14 days of Xeloda (Cycle 4) 9/8/2019 and will be due to start cycle 5 on 9/15/2019.  She is tolerating the medication well. She had a few isolated episodes of diarrhea and did not require imodium. She has mild fatigue. She denies any almazan/ planter desquamation.  She did have some mild almazan erythema and soreness.  She complains of a \"catch\" in her left side for the past week. She denies pain in the area, no complaints with cough or dyspnea.     Past Medical History, Past Surgical History, Social History, Family History have been reviewed and are without significant changes except as mentioned.    Review of Systems   A comprehensive 14 point review of systems was performed and was negative " "except as mentioned.    Medications:  The current medication list was reviewed in the EMR    ALLERGIES:  No Known Allergies    Objective      /71   Pulse 92   Temp 98.1 °F (36.7 °C) (Temporal)   Resp 12   Ht 175.3 cm (69\")   Wt 80.7 kg (178 lb)   SpO2 97%   BMI 26.29 kg/m²      Performance Status: 0    General: well appearing female in no acute distress  Neuro: alert and oriented  HEENT: sclera anicteric, oropharynx clear  Lymphatics: no cervical, supraclavicular, or axillary adenopathy  Chest: Skin over the left axilla is hyperpigmented..  No palpable mass.  She does have firm scar tissue at the lateral edge of the implant.  Cardiovascular: regular rate and rhythm, no murmurs  Lungs: clear to auscultation bilaterally  Abdomen: soft, nontender, nondistended.  No palpable organomegaly  Extremeties: no lower extremity edema  Skin: no rashes, lesions, bruising, or petechiae  Psych: mood and affect appropriate      PET scan shows: 10/2/2019    1. Redemonstration of a small hypermetabolic focus along the left  lateral breast/chest wall subcutaneous tissues with a max SUV of 2.3  (previously noted 2.7) adjacent to the patient's left breast implant.     2. Identified more superiorly along the left lateral chest wall is a 1.3  cm nodular focus within the subcutaneous tissue immediately adjacent to  the patient's breast implant which demonstrates minimal FDG uptake of  1.6, although does appear more nodular on today's exam, although is  within regions of more linear scarring and could certainly relate to  evolution of scar although recurrent disease is not excluded.     3. Negative PET study elsewhere.1. Redemonstration of a small hypermetabolic focus along the left  lateral breast/chest wall subcutaneous tissues with a max SUV of 2.3  (previously noted 2.7) adjacent to the patient's left breast implant.     2. Identified more superiorly along the left lateral chest wall is a 1.3  cm nodular focus within the " subcutaneous tissue immediately adjacent to  the patient's breast implant which demonstrates minimal FDG uptake of  1.6, although does appear more nodular on today's exam, although is  within regions of more linear scarring and could certainly relate to  evolution of scar although recurrent disease is not excluded.     3. Negative PET study elsewhere.        Assessment/Plan   Ina Ram is a 39 y.o. year old female with a stage II B ER weakly positive HER-2 negative breast cancer who returns for follow up.       Continue Xeloda 2000 mg po BID days 1-14 followed by 7 days off. She will start cycle 6 on 10/06/2019.   She will do eight 3-week cycles, or about 6 months of treatment.     She will need a repeat PET scan prior to return in mid December to follow up on decreasing activity in the small hypermetabolic focus previously noted in the lateral subcutaneous tissues, adjacent patient's left breast implant and  left lateral chest wall is a 1.3 cm nodular focus within the subcutaneous tissue immediately adjacent to the patient's breast implant which demonstrates minimal FDG uptake of 1.6.  I spoke to Dr Malloy and the radiologist regarding the PET scan. The new nodular area with FDG of 1.6 could be an evolving scar but it needs to be followed closely.     Follow-up in 2 months with PET scan.      I spent 30 minutes with the patient. I spent > 50% percent of this time counseling and discussing prognosis, diagnostic testing, evaluation, current status and management.      Kendra Weiner APRN  [facility]ematology and Oncology    10/2/2019          CC:

## 2019-12-02 DIAGNOSIS — Z17.0 MALIGNANT NEOPLASM OF UPPER-OUTER QUADRANT OF LEFT BREAST IN FEMALE, ESTROGEN RECEPTOR POSITIVE (HCC): ICD-10-CM

## 2019-12-02 DIAGNOSIS — C50.412 MALIGNANT NEOPLASM OF UPPER-OUTER QUADRANT OF LEFT BREAST IN FEMALE, ESTROGEN RECEPTOR POSITIVE (HCC): ICD-10-CM

## 2019-12-03 RX ORDER — CAPECITABINE 500 MG/1
TABLET, FILM COATED ORAL
Qty: 112 TABLET | Refills: 1 | OUTPATIENT
Start: 2019-12-03

## 2019-12-03 NOTE — TELEPHONE ENCOUNTER
Plan for 8 adjuvant cycles, no additional refills needed, original script submitted included 7 refills to complete 8 total cycles.

## 2019-12-04 DIAGNOSIS — Z17.0 MALIGNANT NEOPLASM OF UPPER-OUTER QUADRANT OF LEFT BREAST IN FEMALE, ESTROGEN RECEPTOR POSITIVE (HCC): ICD-10-CM

## 2019-12-04 DIAGNOSIS — C50.412 MALIGNANT NEOPLASM OF UPPER-OUTER QUADRANT OF LEFT BREAST IN FEMALE, ESTROGEN RECEPTOR POSITIVE (HCC): ICD-10-CM

## 2019-12-04 RX ORDER — CAPECITABINE 500 MG/1
TABLET, FILM COATED ORAL
Qty: 112 TABLET | Refills: 1 | Status: CANCELLED | OUTPATIENT
Start: 2019-12-04

## 2019-12-11 ENCOUNTER — TELEPHONE (OUTPATIENT)
Dept: ONCOLOGY | Facility: CLINIC | Age: 39
End: 2019-12-11

## 2019-12-11 ENCOUNTER — TELEPHONE (OUTPATIENT)
Dept: ONCOLOGY | Facility: HOSPITAL | Age: 39
End: 2019-12-11

## 2019-12-11 NOTE — TELEPHONE ENCOUNTER
----- Message from Fifi Malloy MD sent at 12/11/2019  3:27 PM EST -----  Regarding: RE: 8th Cycle of Xeloda  I would recommend she complete the 8th cycle.  Please let her know.    Thx.      ----- Message -----  From: Tara Arce, PharmD  Sent: 12/11/2019   3:05 PM EST  To: Fifi Malloy MD  Subject: 8th Cycle of Xeloda                              Pt called in this afternoon reporting that an RX for Xeloda had arrived and she is inquiring if she needs to take it as a last cycle.     Pt originally due for   Cycle 6: 10/6/19  Cycle 7: 10/27/19  Cycle 8: 11/7/19     Thus should be completed with treatment, however Pt reports she is unsure if she was able to start previous cycles on exact dates as planned given some changes in insurance requirements with specialty pharmacy and delays in delivery.    I called and obtained the dispense dates of her previous script.   [facility] dispensed on:  -5/28/19  -7/1/19  -8/21/19  -9/16/19    [facility] dispensed on:  10/11/19  11/6/19  12/9/19    Thus, the script she is referencing is in fact the 8th Cycle prescription. Pt inquiring if she should start the 8th cycle to complete her 8 adjuvant doses, or if 6 months have passed and treatment complete. Wanted to pass information along. I am happy to give patient a call with plan.

## 2019-12-11 NOTE — TELEPHONE ENCOUNTER
Pt called because she thought she was done taking capecitabine but recently got another shipment in so she was wondering if she should go ahead and take this round.    Also wondering if she should reschedule next PET scan because if she has to start taking this med again, it will be in her system at the date of the scan.     Please give her a call at

## 2019-12-11 NOTE — PLAN OF CARE
Reached out to patient to discuss plan to complete 8th Adjuvant Cycle. Has Xeloda on hand. Plans to start Xeloda Cycle 8: 12/12/19.

## 2019-12-17 ENCOUNTER — TELEPHONE (OUTPATIENT)
Dept: ONCOLOGY | Facility: CLINIC | Age: 39
End: 2019-12-17

## 2019-12-17 NOTE — TELEPHONE ENCOUNTER
----- Message from Tara Arce PharmD sent at 12/11/2019  3:38 PM EST -----  Regarding: FW: 8th Cycle of Xeloda   I have contacted patient to let her know. However, she wanted to check with RN and MD about timing of PET scan. Currently scheduled for 12/19/19.     ----- Message -----  From: Fifi Malloy MD  Sent: 12/11/2019   3:27 PM EST  To: Tara Arce PharmMARLY  Subject: RE: 8th Cycle of Xeloda                          I would recommend she complete the 8th cycle.  Please let her know.    Thx.      ----- Message -----  From: Tara Arce PharmD  Sent: 12/11/2019   3:05 PM EST  To: Fifi Malloy MD  Subject: 8th Cycle of Xeloda                              Pt called in this afternoon reporting that an RX for Xeloda had arrived and she is inquiring if she needs to take it as a last cycle.     Pt originally due for   Cycle 6: 10/6/19  Cycle 7: 10/27/19  Cycle 8: 11/7/19     Thus should be completed with treatment, however Pt reports she is unsure if she was able to start previous cycles on exact dates as planned given some changes in insurance requirements with specialty pharmacy and delays in delivery.    I called and obtained the dispense dates of her previous script.   [facility] dispensed on:  -5/28/19  -7/1/19  -8/21/19  -9/16/19    [facility] dispensed on:  10/11/19  11/6/19  12/9/19    Thus, the script she is referencing is in fact the 8th Cycle prescription. Pt inquiring if she should start the 8th cycle to complete her 8 adjuvant doses, or if 6 months have passed and treatment complete. Wanted to pass information along. I am happy to give patient a call with plan.

## 2019-12-17 NOTE — TELEPHONE ENCOUNTER
Patient asking if she should reschedule PET since she is currently on xeloda.  Checked with Dr. Malloy and she said the PET could be done as scheduled.  I called patient and let her know to go ahead with the PET on 12/19.  Patient verbalized understanding.

## 2019-12-19 ENCOUNTER — HOSPITAL ENCOUNTER (OUTPATIENT)
Dept: PET IMAGING | Facility: HOSPITAL | Age: 39
Discharge: HOME | End: 2019-12-19
Admitting: NURSE PRACTITIONER

## 2019-12-19 ENCOUNTER — LAB (OUTPATIENT)
Dept: LAB | Facility: HOSPITAL | Age: 39
End: 2019-12-19

## 2019-12-19 ENCOUNTER — HOSPITAL ENCOUNTER (OUTPATIENT)
Dept: PET IMAGING | Facility: HOSPITAL | Age: 39
Discharge: HOME | End: 2019-12-19

## 2019-12-19 ENCOUNTER — OFFICE VISIT (OUTPATIENT)
Dept: ONCOLOGY | Facility: CLINIC | Age: 39
End: 2019-12-19

## 2019-12-19 VITALS
DIASTOLIC BLOOD PRESSURE: 66 MMHG | BODY MASS INDEX: 26.07 KG/M2 | HEART RATE: 77 BPM | WEIGHT: 176 LBS | OXYGEN SATURATION: 98 % | RESPIRATION RATE: 16 BRPM | SYSTOLIC BLOOD PRESSURE: 116 MMHG | HEIGHT: 69 IN | TEMPERATURE: 97.7 F

## 2019-12-19 DIAGNOSIS — C50.412 MALIGNANT NEOPLASM OF UPPER-OUTER QUADRANT OF LEFT BREAST IN FEMALE, ESTROGEN RECEPTOR POSITIVE (HCC): Primary | ICD-10-CM

## 2019-12-19 DIAGNOSIS — C50.412 MALIGNANT NEOPLASM OF UPPER-OUTER QUADRANT OF LEFT BREAST IN FEMALE, ESTROGEN RECEPTOR POSITIVE (HCC): ICD-10-CM

## 2019-12-19 DIAGNOSIS — Z17.0 MALIGNANT NEOPLASM OF UPPER-OUTER QUADRANT OF LEFT BREAST IN FEMALE, ESTROGEN RECEPTOR POSITIVE (HCC): ICD-10-CM

## 2019-12-19 DIAGNOSIS — Z17.0 MALIGNANT NEOPLASM OF UPPER-OUTER QUADRANT OF LEFT BREAST IN FEMALE, ESTROGEN RECEPTOR POSITIVE (HCC): Primary | ICD-10-CM

## 2019-12-19 DIAGNOSIS — R94.8 ABNORMAL POSITRON EMISSION TOMOGRAPHY (PET) SCAN: ICD-10-CM

## 2019-12-19 LAB
ALBUMIN SERPL-MCNC: 4.6 G/DL (ref 3.5–5.2)
ALBUMIN/GLOB SERPL: 1.6 G/DL
ALP SERPL-CCNC: 68 U/L (ref 39–117)
ALT SERPL W P-5'-P-CCNC: 17 U/L (ref 1–33)
ANION GAP SERPL CALCULATED.3IONS-SCNC: 11 MMOL/L (ref 5–15)
AST SERPL-CCNC: 16 U/L (ref 1–32)
BILIRUB SERPL-MCNC: 0.5 MG/DL (ref 0.2–1.2)
BUN BLD-MCNC: 11 MG/DL (ref 6–20)
BUN/CREAT SERPL: 18 (ref 7–25)
CALCIUM SPEC-SCNC: 9.4 MG/DL (ref 8.6–10.5)
CHLORIDE SERPL-SCNC: 105 MMOL/L (ref 98–107)
CO2 SERPL-SCNC: 22 MMOL/L (ref 22–29)
CREAT BLD-MCNC: 0.61 MG/DL (ref 0.57–1)
ERYTHROCYTE [DISTWIDTH] IN BLOOD BY AUTOMATED COUNT: 14.9 % (ref 12.3–15.4)
GFR SERPL CREATININE-BSD FRML MDRD: 109 ML/MIN/1.73
GLOBULIN UR ELPH-MCNC: 2.8 GM/DL
GLUCOSE BLD-MCNC: 106 MG/DL (ref 65–99)
GLUCOSE BLDC GLUCOMTR-MCNC: 120 MG/DL (ref 70–130)
HCT VFR BLD AUTO: 39.3 % (ref 34–46.6)
HGB BLD-MCNC: 13.3 G/DL (ref 12–15.9)
LYMPHOCYTES # BLD AUTO: 1.5 10*3/MM3 (ref 0.7–3.1)
LYMPHOCYTES NFR BLD AUTO: 26 % (ref 19.6–45.3)
MCH RBC QN AUTO: 35.9 PG (ref 26.6–33)
MCHC RBC AUTO-ENTMCNC: 33.7 G/DL (ref 31.5–35.7)
MCV RBC AUTO: 106.6 FL (ref 79–97)
MONOCYTES # BLD AUTO: 0.3 10*3/MM3 (ref 0.1–0.9)
MONOCYTES NFR BLD AUTO: 5.3 % (ref 5–12)
NEUTROPHILS # BLD AUTO: 3.9 10*3/MM3 (ref 1.7–7)
NEUTROPHILS NFR BLD AUTO: 68.7 % (ref 42.7–76)
PLATELET # BLD AUTO: 198 10*3/MM3 (ref 140–450)
PMV BLD AUTO: 7.1 FL (ref 6–12)
POTASSIUM BLD-SCNC: 4 MMOL/L (ref 3.5–5.2)
PROT SERPL-MCNC: 7.4 G/DL (ref 6–8.5)
RBC # BLD AUTO: 3.69 10*6/MM3 (ref 3.77–5.28)
SODIUM BLD-SCNC: 138 MMOL/L (ref 136–145)
WBC NRBC COR # BLD: 5.7 10*3/MM3 (ref 3.4–10.8)

## 2019-12-19 PROCEDURE — 99215 OFFICE O/P EST HI 40 MIN: CPT | Performed by: INTERNAL MEDICINE

## 2019-12-19 PROCEDURE — 85025 COMPLETE CBC W/AUTO DIFF WBC: CPT

## 2019-12-19 PROCEDURE — 82962 GLUCOSE BLOOD TEST: CPT

## 2019-12-19 PROCEDURE — 0 FLUDEOXYGLUCOSE F18 SOLUTION: Performed by: NURSE PRACTITIONER

## 2019-12-19 PROCEDURE — A9552 F18 FDG: HCPCS | Performed by: NURSE PRACTITIONER

## 2019-12-19 PROCEDURE — 80053 COMPREHEN METABOLIC PANEL: CPT

## 2019-12-19 PROCEDURE — 78815 PET IMAGE W/CT SKULL-THIGH: CPT

## 2019-12-19 PROCEDURE — 36415 COLL VENOUS BLD VENIPUNCTURE: CPT

## 2019-12-19 RX ADMIN — FLUDEOXYGLUCOSE F18 1 DOSE: 300 INJECTION INTRAVENOUS at 08:31

## 2019-12-19 NOTE — PROGRESS NOTES
"      PROBLEM LIST:  1. [id number] ER weakly positive (1-3%), NC negative, Her2 negative invasive ductal carcinoma of the left breast  A) presented with a palpable breast mass.  Biopsy showed ER+, NC- Her2- IDC, grade 3.  B) neoadjuvant TAC started July 2018.  Complicated by neutropenic fever after cycle 4.  Dose reduced to 80% for cycles 5 and 6.  C) bilateral mastectomy on 11/27/18.  Pathology showed a 1.7 cm high grade IDC with superficial invasion into the skeletal muscle.  0/2 SLN involved.  D) PET scan on 1/14/2019 showed a single focus of hypermetabolic activity along the left lateral aspect of the breast and chest wall.  On 2/19/2019 she underwent resection of this lesion which showed residual high-grade invasive ductal carcinoma with tumor extending to the deep margin.  Further resection not possible.  Adjuvant radiotherapy completed 5/10/19.  E) 6/15/2019 she started adjuvant Xeloda.       Subjective      Chief Complaint: follow up breast cancer     HISTORY OF PRESENT ILLNESS:   Ina Ram returns for follow-up. 6/15/2019 she started cycle 1 of Xeloda 2000 mg po BID days 1-14 followed by 7 days off.  She is completing her last cycle of Xeloda next week.  She says she is feeling okay.  She has not noticed any new masses or lumps around the implant on the left, but she does notice quite a bit of scar tissue.    Past Medical History, Past Surgical History, Social History, Family History have been reviewed and are without significant changes except as mentioned.    Review of Systems   A comprehensive 14 point review of systems was performed and was negative except as mentioned.    Medications:  The current medication list was reviewed in the EMR    ALLERGIES:  No Known Allergies    Objective      /66 Comment: RUE  Pulse 77   Temp 97.7 °F (36.5 °C) (Temporal)   Resp 16   Ht 175.3 cm (69\")   Wt 79.8 kg (176 lb)   SpO2 98% Comment: RA  BMI 25.99 kg/m²      Performance Status: 0    General: well " appearing female in no acute distress  Neuro: alert and oriented  HEENT: sclera anicteric, oropharynx clear  Lymphatics: no cervical, supraclavicular, or axillary adenopathy  Chest:   No palpable mass.  She does have firm scar tissue at the lateral edge of the implant.  Cardiovascular: regular rate and rhythm, no murmurs  Lungs: clear to auscultation bilaterally  Abdomen: soft, nontender, nondistended.  No palpable organomegaly  Extremeties: no lower extremity edema  Skin: no rashes, lesions, bruising, or petechiae  Psych: mood and affect appropriate          Assessment/Plan   Ina Ram is a 39 y.o. year old female with a stage II B ER weakly positive HER-2 negative breast cancer who returns for follow up.       She will complete Xeloda next week.    I reviewed the PET scan images, discussed them with radiology, and discussed with Dr. Chaudhary.  I also reviewed the images with Ina.  I am concerned about the persistent hypermetabolic focus adjacent to the left implant.  This could be scar tissue but the SUV value has increased compared to 2 months ago and residual cancer is a concern.  She has already had chemotherapy and radiation treatment.  If this is residual disease I would favor surgical resection.    I will refer her for a breast ultrasound to try to localize this lesion.  Dr. Chaudhary will see her for possible resection.  We discussed that rupture or damage to the implant is possible in this setting, but I think it is more important to make sure that her cancer has been completely removed.    I will see her back in about a month.        I spent 40 minutes with the patient. I spent > 50% percent of this time counseling and discussing prognosis, diagnostic testing, evaluation, current status and management.      Fifi Malloy MD  [facility] Hematology and Oncology    12/19/2019          CC:

## 2020-01-02 ENCOUNTER — APPOINTMENT (OUTPATIENT)
Dept: ULTRASOUND IMAGING | Facility: HOSPITAL | Age: 40
End: 2020-01-02

## 2020-01-02 ENCOUNTER — HOSPITAL ENCOUNTER (OUTPATIENT)
Dept: ULTRASOUND IMAGING | Facility: HOSPITAL | Age: 40
Discharge: HOME | End: 2020-01-02
Admitting: RADIOLOGY

## 2020-01-02 DIAGNOSIS — Z17.0 MALIGNANT NEOPLASM OF UPPER-OUTER QUADRANT OF LEFT BREAST IN FEMALE, ESTROGEN RECEPTOR POSITIVE (HCC): ICD-10-CM

## 2020-01-02 DIAGNOSIS — C50.412 MALIGNANT NEOPLASM OF UPPER-OUTER QUADRANT OF LEFT BREAST IN FEMALE, ESTROGEN RECEPTOR POSITIVE (HCC): ICD-10-CM

## 2020-01-02 PROCEDURE — 76642 ULTRASOUND BREAST LIMITED: CPT | Performed by: RADIOLOGY

## 2020-01-02 PROCEDURE — 76642 ULTRASOUND BREAST LIMITED: CPT

## 2020-01-10 ENCOUNTER — HOSPITAL ENCOUNTER (OUTPATIENT)
Dept: MRI IMAGING | Facility: HOSPITAL | Age: 40
Discharge: HOME | End: 2020-01-10
Admitting: SURGERY

## 2020-01-10 DIAGNOSIS — C50.812 MALIGNANT NEOPLASM OF OVERLAPPING SITES OF LEFT FEMALE BREAST (HCC): ICD-10-CM

## 2020-01-10 PROCEDURE — 0 GADOBENATE DIMEGLUMINE 529 MG/ML SOLUTION: Performed by: SURGERY

## 2020-01-10 PROCEDURE — 77049 MRI BREAST C-+ W/CAD BI: CPT | Performed by: RADIOLOGY

## 2020-01-10 PROCEDURE — 77049 MRI BREAST C-+ W/CAD BI: CPT

## 2020-01-10 PROCEDURE — A9577 INJ MULTIHANCE: HCPCS | Performed by: SURGERY

## 2020-01-10 RX ADMIN — GADOBENATE DIMEGLUMINE 15 ML: 529 INJECTION, SOLUTION INTRAVENOUS at 13:54

## 2020-01-16 ENCOUNTER — APPOINTMENT (OUTPATIENT)
Dept: MRI IMAGING | Facility: HOSPITAL | Age: 40
End: 2020-01-16

## 2020-01-20 ENCOUNTER — ANESTHESIA EVENT (OUTPATIENT)
Dept: PERIOP | Facility: HOSPITAL | Age: 40
End: 2020-01-20

## 2020-01-21 ENCOUNTER — ANESTHESIA (OUTPATIENT)
Dept: PERIOP | Facility: HOSPITAL | Age: 40
End: 2020-01-21

## 2020-01-21 ENCOUNTER — HOSPITAL ENCOUNTER (OUTPATIENT)
Facility: HOSPITAL | Age: 40
Setting detail: HOSPITAL OUTPATIENT SURGERY
Discharge: HOME | End: 2020-01-21
Attending: SURGERY | Admitting: SURGERY

## 2020-01-21 VITALS
HEIGHT: 69 IN | DIASTOLIC BLOOD PRESSURE: 76 MMHG | TEMPERATURE: 97.6 F | OXYGEN SATURATION: 97 % | RESPIRATION RATE: 16 BRPM | BODY MASS INDEX: 25.92 KG/M2 | SYSTOLIC BLOOD PRESSURE: 118 MMHG | WEIGHT: 175 LBS | HEART RATE: 65 BPM

## 2020-01-21 DIAGNOSIS — C50.919 BREAST CANCER (HCC): ICD-10-CM

## 2020-01-21 PROCEDURE — 88341 IMHCHEM/IMCYTCHM EA ADD ANTB: CPT | Performed by: SURGERY

## 2020-01-21 PROCEDURE — 25010000002 PROPOFOL 10 MG/ML EMULSION: Performed by: NURSE ANESTHETIST, CERTIFIED REGISTERED

## 2020-01-21 PROCEDURE — 25010000002 ONDANSETRON PER 1 MG: Performed by: NURSE ANESTHETIST, CERTIFIED REGISTERED

## 2020-01-21 PROCEDURE — 25010000002 PROMETHAZINE PER 50 MG: Performed by: NURSE ANESTHETIST, CERTIFIED REGISTERED

## 2020-01-21 PROCEDURE — 25010000002 CEFOXITIN PER 1 G: Performed by: SURGERY

## 2020-01-21 PROCEDURE — 88331 PATH CONSLTJ SURG 1 BLK 1SPC: CPT | Performed by: PATHOLOGY

## 2020-01-21 PROCEDURE — 25010000002 NEOSTIGMINE 10 MG/10ML SOLUTION: Performed by: NURSE ANESTHETIST, CERTIFIED REGISTERED

## 2020-01-21 PROCEDURE — 88360 TUMOR IMMUNOHISTOCHEM/MANUAL: CPT | Performed by: SURGERY

## 2020-01-21 PROCEDURE — 81025 URINE PREGNANCY TEST: CPT | Performed by: ANESTHESIOLOGY

## 2020-01-21 PROCEDURE — 88307 TISSUE EXAM BY PATHOLOGIST: CPT | Performed by: SURGERY

## 2020-01-21 PROCEDURE — 25010000002 BUPRENORPHINE PER 0.1 MG: Performed by: ANESTHESIOLOGY

## 2020-01-21 PROCEDURE — 25010000002 FENTANYL CITRATE (PF) 100 MCG/2ML SOLUTION: Performed by: NURSE ANESTHETIST, CERTIFIED REGISTERED

## 2020-01-21 PROCEDURE — 88342 IMHCHEM/IMCYTCHM 1ST ANTB: CPT | Performed by: SURGERY

## 2020-01-21 PROCEDURE — 25010000002 DEXAMETHASONE SODIUM PHOSPHATE 10 MG/ML SOLUTION: Performed by: ANESTHESIOLOGY

## 2020-01-21 PROCEDURE — 25010000002 DEXAMETHASONE PER 1 MG: Performed by: NURSE ANESTHETIST, CERTIFIED REGISTERED

## 2020-01-21 RX ORDER — LIDOCAINE HYDROCHLORIDE AND EPINEPHRINE 10; 10 MG/ML; UG/ML
INJECTION, SOLUTION INFILTRATION; PERINEURAL AS NEEDED
Status: DISCONTINUED | OUTPATIENT
Start: 2020-01-21 | End: 2020-01-21 | Stop reason: HOSPADM

## 2020-01-21 RX ORDER — GLYCOPYRROLATE 0.2 MG/ML
INJECTION INTRAMUSCULAR; INTRAVENOUS AS NEEDED
Status: DISCONTINUED | OUTPATIENT
Start: 2020-01-21 | End: 2020-01-21 | Stop reason: SURG

## 2020-01-21 RX ORDER — DEXAMETHASONE SODIUM PHOSPHATE 4 MG/ML
INJECTION, SOLUTION INTRA-ARTICULAR; INTRALESIONAL; INTRAMUSCULAR; INTRAVENOUS; SOFT TISSUE AS NEEDED
Status: DISCONTINUED | OUTPATIENT
Start: 2020-01-21 | End: 2020-01-21 | Stop reason: SURG

## 2020-01-21 RX ORDER — LIDOCAINE HYDROCHLORIDE 10 MG/ML
0.5 INJECTION, SOLUTION EPIDURAL; INFILTRATION; INTRACAUDAL; PERINEURAL ONCE AS NEEDED
Status: COMPLETED | OUTPATIENT
Start: 2020-01-21 | End: 2020-01-21

## 2020-01-21 RX ORDER — SODIUM CHLORIDE 0.9 % (FLUSH) 0.9 %
10 SYRINGE (ML) INJECTION AS NEEDED
Status: DISCONTINUED | OUTPATIENT
Start: 2020-01-21 | End: 2020-01-21 | Stop reason: HOSPADM

## 2020-01-21 RX ORDER — NEOSTIGMINE METHYLSULFATE 1 MG/ML
INJECTION, SOLUTION INTRAVENOUS AS NEEDED
Status: DISCONTINUED | OUTPATIENT
Start: 2020-01-21 | End: 2020-01-21 | Stop reason: SURG

## 2020-01-21 RX ORDER — ROCURONIUM BROMIDE 10 MG/ML
INJECTION, SOLUTION INTRAVENOUS AS NEEDED
Status: DISCONTINUED | OUTPATIENT
Start: 2020-01-21 | End: 2020-01-21 | Stop reason: SURG

## 2020-01-21 RX ORDER — DOXYCYCLINE HYCLATE 100 MG/1
100 CAPSULE ORAL 2 TIMES DAILY
Qty: 14 CAPSULE | Refills: 0 | Status: SHIPPED | OUTPATIENT
Start: 2020-01-21 | End: 2020-01-28

## 2020-01-21 RX ORDER — SODIUM CHLORIDE, SODIUM LACTATE, POTASSIUM CHLORIDE, CALCIUM CHLORIDE 600; 310; 30; 20 MG/100ML; MG/100ML; MG/100ML; MG/100ML
9 INJECTION, SOLUTION INTRAVENOUS CONTINUOUS
Status: DISCONTINUED | OUTPATIENT
Start: 2020-01-21 | End: 2020-01-21 | Stop reason: HOSPADM

## 2020-01-21 RX ORDER — FAMOTIDINE 20 MG/1
20 TABLET, FILM COATED ORAL ONCE
Status: COMPLETED | OUTPATIENT
Start: 2020-01-21 | End: 2020-01-21

## 2020-01-21 RX ORDER — PROMETHAZINE HYDROCHLORIDE 25 MG/ML
12.5 INJECTION, SOLUTION INTRAMUSCULAR; INTRAVENOUS ONCE AS NEEDED
Status: DISCONTINUED | OUTPATIENT
Start: 2020-01-21 | End: 2020-01-21 | Stop reason: HOSPADM

## 2020-01-21 RX ORDER — LIDOCAINE HYDROCHLORIDE 10 MG/ML
INJECTION, SOLUTION EPIDURAL; INFILTRATION; INTRACAUDAL; PERINEURAL AS NEEDED
Status: DISCONTINUED | OUTPATIENT
Start: 2020-01-21 | End: 2020-01-21 | Stop reason: SURG

## 2020-01-21 RX ORDER — PROMETHAZINE HYDROCHLORIDE 25 MG/ML
INJECTION, SOLUTION INTRAMUSCULAR; INTRAVENOUS AS NEEDED
Status: DISCONTINUED | OUTPATIENT
Start: 2020-01-21 | End: 2020-01-21 | Stop reason: SURG

## 2020-01-21 RX ORDER — PROMETHAZINE HYDROCHLORIDE 25 MG/1
25 SUPPOSITORY RECTAL ONCE AS NEEDED
Status: DISCONTINUED | OUTPATIENT
Start: 2020-01-21 | End: 2020-01-21 | Stop reason: HOSPADM

## 2020-01-21 RX ORDER — FENTANYL CITRATE 50 UG/ML
INJECTION, SOLUTION INTRAMUSCULAR; INTRAVENOUS AS NEEDED
Status: DISCONTINUED | OUTPATIENT
Start: 2020-01-21 | End: 2020-01-21 | Stop reason: SURG

## 2020-01-21 RX ORDER — PROPOFOL 10 MG/ML
VIAL (ML) INTRAVENOUS AS NEEDED
Status: DISCONTINUED | OUTPATIENT
Start: 2020-01-21 | End: 2020-01-21 | Stop reason: SURG

## 2020-01-21 RX ORDER — OXYCODONE HYDROCHLORIDE AND ACETAMINOPHEN 5; 325 MG/1; MG/1
1-2 TABLET ORAL EVERY 4 HOURS PRN
Qty: 15 TABLET | Refills: 0 | Status: ON HOLD | OUTPATIENT
Start: 2020-01-21 | End: 2020-03-03 | Stop reason: SDUPTHER

## 2020-01-21 RX ORDER — DEXAMETHASONE SODIUM PHOSPHATE 10 MG/ML
INJECTION, SOLUTION INTRAMUSCULAR; INTRAVENOUS
Status: COMPLETED | OUTPATIENT
Start: 2020-01-21 | End: 2020-01-21

## 2020-01-21 RX ORDER — FENTANYL CITRATE 50 UG/ML
50 INJECTION, SOLUTION INTRAMUSCULAR; INTRAVENOUS
Status: DISCONTINUED | OUTPATIENT
Start: 2020-01-21 | End: 2020-01-21 | Stop reason: HOSPADM

## 2020-01-21 RX ORDER — BUPIVACAINE HYDROCHLORIDE 2.5 MG/ML
INJECTION, SOLUTION EPIDURAL; INFILTRATION; INTRACAUDAL
Status: COMPLETED | OUTPATIENT
Start: 2020-01-21 | End: 2020-01-21

## 2020-01-21 RX ORDER — ONDANSETRON 4 MG/1
4 TABLET, FILM COATED ORAL EVERY 8 HOURS PRN
Qty: 20 TABLET | Refills: 1 | Status: SHIPPED | OUTPATIENT
Start: 2020-01-21 | End: 2020-03-03 | Stop reason: HOSPADM

## 2020-01-21 RX ORDER — SODIUM CHLORIDE 0.9 % (FLUSH) 0.9 %
10 SYRINGE (ML) INJECTION EVERY 12 HOURS SCHEDULED
Status: DISCONTINUED | OUTPATIENT
Start: 2020-01-21 | End: 2020-01-21 | Stop reason: HOSPADM

## 2020-01-21 RX ORDER — FAMOTIDINE 10 MG/ML
20 INJECTION, SOLUTION INTRAVENOUS ONCE
Status: DISCONTINUED | OUTPATIENT
Start: 2020-01-21 | End: 2020-01-21 | Stop reason: HOSPADM

## 2020-01-21 RX ORDER — PROMETHAZINE HYDROCHLORIDE 25 MG/1
25 TABLET ORAL ONCE AS NEEDED
Status: DISCONTINUED | OUTPATIENT
Start: 2020-01-21 | End: 2020-01-21 | Stop reason: HOSPADM

## 2020-01-21 RX ORDER — MAGNESIUM HYDROXIDE 1200 MG/15ML
LIQUID ORAL AS NEEDED
Status: DISCONTINUED | OUTPATIENT
Start: 2020-01-21 | End: 2020-01-21 | Stop reason: HOSPADM

## 2020-01-21 RX ORDER — ONDANSETRON 2 MG/ML
INJECTION INTRAMUSCULAR; INTRAVENOUS AS NEEDED
Status: DISCONTINUED | OUTPATIENT
Start: 2020-01-21 | End: 2020-01-21 | Stop reason: SURG

## 2020-01-21 RX ORDER — HYDROMORPHONE HYDROCHLORIDE 1 MG/ML
0.5 INJECTION, SOLUTION INTRAMUSCULAR; INTRAVENOUS; SUBCUTANEOUS
Status: DISCONTINUED | OUTPATIENT
Start: 2020-01-21 | End: 2020-01-21 | Stop reason: HOSPADM

## 2020-01-21 RX ORDER — BUPRENORPHINE HYDROCHLORIDE 0.32 MG/ML
INJECTION INTRAMUSCULAR; INTRAVENOUS
Status: COMPLETED | OUTPATIENT
Start: 2020-01-21 | End: 2020-01-21

## 2020-01-21 RX ORDER — ONDANSETRON 2 MG/ML
4 INJECTION INTRAMUSCULAR; INTRAVENOUS ONCE AS NEEDED
Status: DISCONTINUED | OUTPATIENT
Start: 2020-01-21 | End: 2020-01-21 | Stop reason: HOSPADM

## 2020-01-21 RX ADMIN — BUPIVACAINE HYDROCHLORIDE 30 ML: 2.5 INJECTION, SOLUTION EPIDURAL; INFILTRATION; INTRACAUDAL; PERINEURAL at 07:32

## 2020-01-21 RX ADMIN — ROCURONIUM BROMIDE 40 MG: 10 INJECTION INTRAVENOUS at 07:32

## 2020-01-21 RX ADMIN — ONDANSETRON 4 MG: 2 INJECTION INTRAMUSCULAR; INTRAVENOUS at 09:02

## 2020-01-21 RX ADMIN — PROPOFOL 200 MG: 10 INJECTION, EMULSION INTRAVENOUS at 07:32

## 2020-01-21 RX ADMIN — FENTANYL CITRATE 25 MCG: 50 INJECTION, SOLUTION INTRAMUSCULAR; INTRAVENOUS at 08:03

## 2020-01-21 RX ADMIN — DEXAMETHASONE SODIUM PHOSPHATE 2 MG: 10 INJECTION INTRAMUSCULAR; INTRAVENOUS at 07:32

## 2020-01-21 RX ADMIN — LIDOCAINE HYDROCHLORIDE 50 MG: 10 INJECTION, SOLUTION EPIDURAL; INFILTRATION; INTRACAUDAL; PERINEURAL at 07:32

## 2020-01-21 RX ADMIN — ROCURONIUM BROMIDE 10 MG: 10 INJECTION INTRAVENOUS at 08:35

## 2020-01-21 RX ADMIN — SODIUM CHLORIDE, POTASSIUM CHLORIDE, SODIUM LACTATE AND CALCIUM CHLORIDE: 600; 310; 30; 20 INJECTION, SOLUTION INTRAVENOUS at 07:30

## 2020-01-21 RX ADMIN — BUPRENORPHINE HYDROCHLORIDE 0.3 MG: 0.32 INJECTION INTRAMUSCULAR; INTRAVENOUS at 07:32

## 2020-01-21 RX ADMIN — LIDOCAINE HYDROCHLORIDE 0.5 ML: 10 INJECTION, SOLUTION EPIDURAL; INFILTRATION; INTRACAUDAL; PERINEURAL at 06:20

## 2020-01-21 RX ADMIN — GLYCOPYRROLATE 0.4 MG: 0.2 INJECTION, SOLUTION INTRAMUSCULAR; INTRAVENOUS at 09:09

## 2020-01-21 RX ADMIN — SODIUM CHLORIDE, POTASSIUM CHLORIDE, SODIUM LACTATE AND CALCIUM CHLORIDE 9 ML/HR: 600; 310; 30; 20 INJECTION, SOLUTION INTRAVENOUS at 06:20

## 2020-01-21 RX ADMIN — FENTANYL CITRATE 50 MCG: 0.05 INJECTION, SOLUTION INTRAMUSCULAR; INTRAVENOUS at 10:04

## 2020-01-21 RX ADMIN — NEOSTIGMINE METHYLSULFATE 3 MG: 1 INJECTION, SOLUTION INTRAVENOUS at 09:09

## 2020-01-21 RX ADMIN — PROPOFOL 25 MCG/KG/MIN: 10 INJECTION, EMULSION INTRAVENOUS at 07:40

## 2020-01-21 RX ADMIN — FAMOTIDINE 20 MG: 20 TABLET, FILM COATED ORAL at 06:20

## 2020-01-21 RX ADMIN — CEFOXITIN SODIUM 2 G: 1 POWDER, FOR SOLUTION INTRAVENOUS at 07:30

## 2020-01-21 RX ADMIN — PROMETHAZINE HYDROCHLORIDE 5 MG: 25 INJECTION INTRAMUSCULAR; INTRAVENOUS at 07:37

## 2020-01-21 RX ADMIN — DEXAMETHASONE SODIUM PHOSPHATE 8 MG: 4 INJECTION, SOLUTION INTRAMUSCULAR; INTRAVENOUS at 07:37

## 2020-01-21 NOTE — ANESTHESIA PROCEDURE NOTES
Airway  Urgency: elective    Date/Time: 1/21/2020 7:35 AM  Airway not difficult    General Information and Staff    Patient location during procedure: OR  CRNA: Lena Bertrand CRNA    Indications and Patient Condition  Indications for airway management: airway protection    Preoxygenated: yes  MILS not maintained throughout  Mask difficulty assessment: 1 - vent by mask    Final Airway Details  Final airway type: endotracheal airway      Successful airway: ETT  Cuffed: yes   Successful intubation technique: direct laryngoscopy  Facilitating devices/methods: intubating stylet  Endotracheal tube insertion site: oral  Blade: Karmen  Blade size: 3  ETT size (mm): 7.0  Cormack-Lehane Classification: grade I - full view of glottis  Placement verified by: chest auscultation and capnometry   Measured from: lips  ETT/EBT  to lips (cm): 21  Number of attempts at approach: 1  Assessment: lips, teeth, and gum same as pre-op and atraumatic intubation    Additional Comments  Negative epigastric sounds, Breath sound equal bilaterally with symmetric chest rise and fall

## 2020-01-21 NOTE — ANESTHESIA PROCEDURE NOTES
Left PECs I & II      Patient reassessed immediately prior to procedure    Patient location during procedure: OR  Start time: 1/21/2020 7:31 AM  Stop time: 1/21/2020 7:32 AM  Reason for block: at surgeon's request and post-op pain management  Performed by  Anesthesiologist: Faizan Ca MD  Preanesthetic Checklist  Completed: patient identified, site marked, surgical consent, pre-op evaluation, timeout performed, IV checked, risks and benefits discussed and monitors and equipment checked  Prep:  Pt Position: supine  Sterile barriers:cap, gloves, gown and mask  Prep: ChloraPrep  Patient monitoring: blood pressure monitoring, continuous pulse oximetry and EKG  Procedure  Performed under: general  Guidance:ultrasound guided and landmark technique  Images:still images not obtained    Laterality:left  Block Type:PECS I and PECS II  Injection Technique:single-shot  Needle Type:short-bevel  Needle Gauge:20 G  Resistance on Injection: none    Medications Used: buprenorphine (BUPRENEX) injection, 0.3 mg  dexamethasone sodium phosphate injection, 2 mg  bupivacaine PF (MARCAINE) 0.25 % injection, 30 mL  Med admintered at 1/21/2020 7:32 AM      Medications  Preservative Free Saline:4ml  Comment:Block Injection:  Total volume of LA divided between I and II blocks         Post Assessment  Injection Assessment: negative aspiration for heme and incremental injection  Patient Tolerance:comfortable throughout block  Complications:no  Additional Notes  The pt. Was placed in the Supine Position and GA was induced     The insertion site was prepped with CHG and Ultrasound guidance with In-Plane techniquewas  a 4inch BBraun 360 degree echogenic needle was visualized.  Normal Saline PSF was  utilized for hydrodissection of tissue. PECS 1 Block- Pectoralis Major and Minor where identified and LA was injected between PMM and PmM at the level of the 3rd Rib(10ml),  PECS 2-  Pectoralis Minor and Serratus muscle where identified and the  needle was advanced laterally in-plane with the 4th rib as a backstop, pleura was monitored.  LA was injected between SA and PmM at the level of 4th rib( 20ml).  LA injection spread was visualized, injection was incremental 1-5ml, normal or low injection pressure, no intravascular injection, no pneumothorax appreciated.  Thank You.

## 2020-01-21 NOTE — ANESTHESIA POSTPROCEDURE EVALUATION
Patient: Ina Ram    Procedure Summary     Date:  01/21/20 Room / [facility]:   [facility]    Anesthesia Start:  0730 Anesthesia Stop:  0927    Procedures:       WIDE EXCISION LEFT BREAST CANCER (Left Breast)      COMPLEX CLOSURE ON LEFT AXILLA (Left ) Diagnosis:  Recurrent breast cancer, left (CMS/HCC)    Surgeon:  Josie Chaudhary MD; Stephan Cowan MD Provider:  Faizan Ca MD    Anesthesia Type:  general with block ASA Status:  3          Anesthesia Type: general with block    Vitals  Vitals Value Taken Time   /81 1/21/2020  9:27 AM   Temp 98 °F (36.7 °C) 1/21/2020  9:27 AM   Pulse 73 1/21/2020  9:27 AM   Resp 12 1/21/2020  9:27 AM   SpO2 100 % 1/21/2020  9:27 AM           Post Anesthesia Care and Evaluation    Patient location during evaluation: PACU  Patient participation: complete - patient participated  Level of consciousness: awake and alert  Pain score: 0  Pain management: adequate  Airway patency: patent  Anesthetic complications: No anesthetic complications  PONV Status: none  Cardiovascular status: hemodynamically stable and acceptable  Respiratory status: nonlabored ventilation, acceptable and nasal cannula  Hydration status: acceptable

## 2020-01-21 NOTE — ANESTHESIA PREPROCEDURE EVALUATION
Anesthesia Evaluation     Patient summary reviewed and Nursing notes reviewed   no history of anesthetic complications:  NPO Solid Status: > 8 hours  NPO Liquid Status: > 2 hours           Airway   Mallampati: II  TM distance: >3 FB  Neck ROM: full  No difficulty expected  Dental - normal exam     Pulmonary - negative pulmonary ROS and normal exam    breath sounds clear to auscultation  Cardiovascular - negative cardio ROS and normal exam    Rhythm: regular  Rate: normal        Neuro/Psych- negative ROS  GI/Hepatic/Renal/Endo - negative ROS     Musculoskeletal (-) negative ROS    Abdominal    Substance History      OB/GYN          Other      history of cancer (Breast ca)                    Anesthesia Plan    ASA 3     general with block   (Left PECS I&II blocks post-induction for post-operative analgesia per request of Uday PHILIPPE & Camryn)  intravenous induction     Anesthetic plan, all risks, benefits, and alternatives have been provided, discussed and informed consent has been obtained with: patient.    Plan discussed with CRNA.

## 2020-01-29 LAB
CYTO UR: NORMAL
LAB AP CASE REPORT: NORMAL
LAB AP CLINICAL INFORMATION: NORMAL
LAB AP SPECIAL STAINS: NORMAL
Lab: NORMAL
PATH REPORT.FINAL DX SPEC: NORMAL
PATH REPORT.GROSS SPEC: NORMAL

## 2020-02-06 ENCOUNTER — LAB (OUTPATIENT)
Dept: LAB | Facility: HOSPITAL | Age: 40
End: 2020-02-06

## 2020-02-06 ENCOUNTER — OFFICE VISIT (OUTPATIENT)
Dept: ONCOLOGY | Facility: CLINIC | Age: 40
End: 2020-02-06

## 2020-02-06 VITALS
WEIGHT: 178 LBS | DIASTOLIC BLOOD PRESSURE: 67 MMHG | HEIGHT: 69 IN | RESPIRATION RATE: 16 BRPM | BODY MASS INDEX: 26.36 KG/M2 | SYSTOLIC BLOOD PRESSURE: 112 MMHG | HEART RATE: 82 BPM | OXYGEN SATURATION: 99 % | TEMPERATURE: 97.7 F

## 2020-02-06 DIAGNOSIS — C50.412 MALIGNANT NEOPLASM OF UPPER-OUTER QUADRANT OF LEFT BREAST IN FEMALE, ESTROGEN RECEPTOR POSITIVE (HCC): ICD-10-CM

## 2020-02-06 DIAGNOSIS — Z17.0 MALIGNANT NEOPLASM OF UPPER-OUTER QUADRANT OF LEFT BREAST IN FEMALE, ESTROGEN RECEPTOR POSITIVE (HCC): Primary | ICD-10-CM

## 2020-02-06 DIAGNOSIS — C50.412 MALIGNANT NEOPLASM OF UPPER-OUTER QUADRANT OF LEFT BREAST IN FEMALE, ESTROGEN RECEPTOR POSITIVE (HCC): Primary | ICD-10-CM

## 2020-02-06 DIAGNOSIS — Z17.0 MALIGNANT NEOPLASM OF UPPER-OUTER QUADRANT OF LEFT BREAST IN FEMALE, ESTROGEN RECEPTOR POSITIVE (HCC): ICD-10-CM

## 2020-02-06 LAB
ALBUMIN SERPL-MCNC: 4.7 G/DL (ref 3.5–5.2)
ALBUMIN/GLOB SERPL: 1.7 G/DL
ALP SERPL-CCNC: 66 U/L (ref 39–117)
ALT SERPL W P-5'-P-CCNC: 19 U/L (ref 1–33)
ANION GAP SERPL CALCULATED.3IONS-SCNC: 7 MMOL/L (ref 5–15)
AST SERPL-CCNC: 14 U/L (ref 1–32)
BILIRUB SERPL-MCNC: 0.3 MG/DL (ref 0.2–1.2)
BUN BLD-MCNC: 9 MG/DL (ref 6–20)
BUN/CREAT SERPL: 13 (ref 7–25)
CALCIUM SPEC-SCNC: 9.5 MG/DL (ref 8.6–10.5)
CHLORIDE SERPL-SCNC: 107 MMOL/L (ref 98–107)
CO2 SERPL-SCNC: 26 MMOL/L (ref 22–29)
CREAT BLD-MCNC: 0.69 MG/DL (ref 0.57–1)
ERYTHROCYTE [DISTWIDTH] IN BLOOD BY AUTOMATED COUNT: 13.6 % (ref 12.3–15.4)
GFR SERPL CREATININE-BSD FRML MDRD: 95 ML/MIN/1.73
GLOBULIN UR ELPH-MCNC: 2.7 GM/DL
GLUCOSE BLD-MCNC: 99 MG/DL (ref 65–99)
HCT VFR BLD AUTO: 39.3 % (ref 34–46.6)
HGB BLD-MCNC: 13 G/DL (ref 12–15.9)
LYMPHOCYTES # BLD AUTO: 1.3 10*3/MM3 (ref 0.7–3.1)
LYMPHOCYTES NFR BLD AUTO: 24.3 % (ref 19.6–45.3)
MCH RBC QN AUTO: 32.9 PG (ref 26.6–33)
MCHC RBC AUTO-ENTMCNC: 33.1 G/DL (ref 31.5–35.7)
MCV RBC AUTO: 99.6 FL (ref 79–97)
MONOCYTES # BLD AUTO: 0.5 10*3/MM3 (ref 0.1–0.9)
MONOCYTES NFR BLD AUTO: 9.1 % (ref 5–12)
NEUTROPHILS # BLD AUTO: 3.7 10*3/MM3 (ref 1.7–7)
NEUTROPHILS NFR BLD AUTO: 66.6 % (ref 42.7–76)
PLATELET # BLD AUTO: 255 10*3/MM3 (ref 140–450)
PMV BLD AUTO: 6.8 FL (ref 6–12)
POTASSIUM BLD-SCNC: 3.8 MMOL/L (ref 3.5–5.2)
PROT SERPL-MCNC: 7.4 G/DL (ref 6–8.5)
RBC # BLD AUTO: 3.95 10*6/MM3 (ref 3.77–5.28)
SODIUM BLD-SCNC: 140 MMOL/L (ref 136–145)
WBC NRBC COR # BLD: 5.5 10*3/MM3 (ref 3.4–10.8)

## 2020-02-06 PROCEDURE — 99214 OFFICE O/P EST MOD 30 MIN: CPT | Performed by: NURSE PRACTITIONER

## 2020-02-06 PROCEDURE — 36415 COLL VENOUS BLD VENIPUNCTURE: CPT

## 2020-02-06 PROCEDURE — 85025 COMPLETE CBC W/AUTO DIFF WBC: CPT

## 2020-02-06 PROCEDURE — 80053 COMPREHEN METABOLIC PANEL: CPT

## 2020-02-06 NOTE — PROGRESS NOTES
PROBLEM LIST:  1. [id number] ER weakly positive (1-3%), ID negative, Her2 negative invasive ductal carcinoma of the left breast  A) presented with a palpable breast mass.  Biopsy showed ER+, ID- Her2- IDC, grade 3.  B) neoadjuvant TAC started July 2018.  Complicated by neutropenic fever after cycle 4.  Dose reduced to 80% for cycles 5 and 6.  C) bilateral mastectomy on 11/27/18.  Pathology showed a 1.7 cm high grade IDC with superficial invasion into the skeletal muscle.  0/2 SLN involved.  D) PET scan on 1/14/2019 showed a single focus of hypermetabolic activity along the left lateral aspect of the breast and chest wall.  On 2/19/2019 she underwent resection of this lesion which showed residual high-grade invasive ductal carcinoma with tumor extending to the deep margin.  Further resection not possible.  Adjuvant radiotherapy completed 5/10/19.  E) 6/15/2019 she started adjuvant Xeloda.   F) PET scan on 2/19/2019 showed persistent hypermetabolic focus adjacent to the left implant.  On 1/21/2020 she underwent wide excision which showed infiltrating, poorly differentiated ductal adenocarcinoma with positive margins, specimen with extended margins negative      Subjective      Chief Complaint: follow up breast cancer     HISTORY OF PRESENT ILLNESS:   Ina Ram returns for follow-up.  She had wide excision of recurrent left breast cancer per Dr. PHILIPPE on 1/21/2020.  She is doing well recovering.  She will see her plastic surgeon this afternoon.  She has no new issues or concerns.      Past Medical History, Past Surgical History, Social History, Family History have been reviewed and are without significant changes except as mentioned.    Review of Systems   A comprehensive 14 point review of systems was performed and was negative except as mentioned.    Medications:  The current medication list was reviewed in the EMR    ALLERGIES:  No Known Allergies    Objective      /67 Comment: RUE  Pulse 82   Temp  "97.7 °F (36.5 °C) (Temporal)   Resp 16   Ht 175.3 cm (69\")   Wt 80.7 kg (178 lb)   SpO2 99% Comment: RA  BMI 26.29 kg/m²      Performance Status: 0    General: well appearing female in no acute distress  Neuro: alert and oriented  HEENT: sclera anicteric, oropharynx clear  Lymphatics: no cervical, supraclavicular, or axillary adenopathy  Chest:   No palpable mass.  She does have firm scar tissue at the lateral edge of the implant.  Cardiovascular: regular rate and rhythm, no murmurs  Lungs: clear to auscultation bilaterally  Abdomen: soft, nontender, nondistended.  No palpable organomegaly  Extremeties: no lower extremity edema  Skin: left axilla surgical incision healing well  Psych: mood and affect appropriate      Assessment/Plan   Ina Ram is a 39 y.o. year old female with a stage II B ER weakly positive HER-2 negative breast cancer who returns for follow up.       She completed 6 months of Xeloda December 2019.      PET scan 12/19/2019 showed persistent hypermetabolic focus adjacent to the left implant.  Patient had wide excision on 1/21/2020 per Dr. Chaudhary.  Pathology showed infiltrating poorly differentiated ductal adenocarcinoma with positive margin noted on one of the specimens.  Discussed with Dr. Malloy who contacted pathologist who reviewed pathology and stated final report is negative surgical margins.  At this time we will proceed with observation.  Patient is at high risk for recurrence.  She will follow up in 3 weeks with PET scan.      I spent 25 minutes with the patient. I spent > 50% percent of this time counseling and discussing prognosis, diagnostic testing, evaluation, current status and management.      Samantha Lovell, APRN  [facility] Hematology and Oncology    2/6/2020             "

## 2020-03-03 ENCOUNTER — ANESTHESIA (OUTPATIENT)
Dept: PERIOP | Facility: HOSPITAL | Age: 40
End: 2020-03-03

## 2020-03-03 ENCOUNTER — HOSPITAL ENCOUNTER (OUTPATIENT)
Facility: HOSPITAL | Age: 40
Setting detail: HOSPITAL OUTPATIENT SURGERY
Discharge: HOME | End: 2020-03-03
Attending: PLASTIC SURGERY | Admitting: PLASTIC SURGERY

## 2020-03-03 ENCOUNTER — ANESTHESIA EVENT (OUTPATIENT)
Dept: PERIOP | Facility: HOSPITAL | Age: 40
End: 2020-03-03

## 2020-03-03 VITALS
WEIGHT: 178 LBS | HEART RATE: 88 BPM | HEIGHT: 69 IN | TEMPERATURE: 98.2 F | OXYGEN SATURATION: 94 % | RESPIRATION RATE: 20 BRPM | SYSTOLIC BLOOD PRESSURE: 121 MMHG | BODY MASS INDEX: 26.36 KG/M2 | DIASTOLIC BLOOD PRESSURE: 73 MMHG

## 2020-03-03 DIAGNOSIS — C50.412 MALIGNANT NEOPLASM OF UPPER-OUTER QUADRANT OF LEFT BREAST IN FEMALE, ESTROGEN RECEPTOR POSITIVE (HCC): Primary | ICD-10-CM

## 2020-03-03 DIAGNOSIS — Z17.0 MALIGNANT NEOPLASM OF UPPER-OUTER QUADRANT OF LEFT BREAST IN FEMALE, ESTROGEN RECEPTOR POSITIVE (HCC): Primary | ICD-10-CM

## 2020-03-03 LAB
ANION GAP SERPL CALCULATED.3IONS-SCNC: 12 MMOL/L (ref 5–15)
B-HCG UR QL: NEGATIVE
BUN BLD-MCNC: 8 MG/DL (ref 6–20)
BUN/CREAT SERPL: 11.9 (ref 7–25)
CALCIUM SPEC-SCNC: 9.3 MG/DL (ref 8.6–10.5)
CHLORIDE SERPL-SCNC: 103 MMOL/L (ref 98–107)
CO2 SERPL-SCNC: 23 MMOL/L (ref 22–29)
CREAT BLD-MCNC: 0.67 MG/DL (ref 0.57–1)
DEPRECATED RDW RBC AUTO: 40.9 FL (ref 37–54)
ERYTHROCYTE [DISTWIDTH] IN BLOOD BY AUTOMATED COUNT: 11.6 % (ref 12.3–15.4)
GFR SERPL CREATININE-BSD FRML MDRD: 98 ML/MIN/1.73
GLUCOSE BLD-MCNC: 88 MG/DL (ref 65–99)
HCT VFR BLD AUTO: 41.2 % (ref 34–46.6)
HGB BLD-MCNC: 13.9 G/DL (ref 12–15.9)
INTERNAL NEGATIVE CONTROL: NORMAL
INTERNAL POSITIVE CONTROL: REACTIVE
Lab: NORMAL
MCH RBC QN AUTO: 32.7 PG (ref 26.6–33)
MCHC RBC AUTO-ENTMCNC: 33.7 G/DL (ref 31.5–35.7)
MCV RBC AUTO: 96.9 FL (ref 79–97)
PLATELET # BLD AUTO: 205 10*3/MM3 (ref 140–450)
PMV BLD AUTO: 9.4 FL (ref 6–12)
POTASSIUM BLD-SCNC: 3.9 MMOL/L (ref 3.5–5.2)
RBC # BLD AUTO: 4.25 10*6/MM3 (ref 3.77–5.28)
SODIUM BLD-SCNC: 138 MMOL/L (ref 136–145)
WBC NRBC COR # BLD: 8.18 10*3/MM3 (ref 3.4–10.8)

## 2020-03-03 PROCEDURE — 80048 BASIC METABOLIC PNL TOTAL CA: CPT | Performed by: PLASTIC SURGERY

## 2020-03-03 PROCEDURE — 25010000003 CEFAZOLIN IN DEXTROSE 2-4 GM/100ML-% SOLUTION: Performed by: PLASTIC SURGERY

## 2020-03-03 PROCEDURE — 85027 COMPLETE CBC AUTOMATED: CPT | Performed by: PLASTIC SURGERY

## 2020-03-03 PROCEDURE — 25010000003 MEPERIDINE PER 100 MG: Performed by: NURSE ANESTHETIST, CERTIFIED REGISTERED

## 2020-03-03 PROCEDURE — 25010000002 PROPOFOL 10 MG/ML EMULSION: Performed by: NURSE ANESTHETIST, CERTIFIED REGISTERED

## 2020-03-03 PROCEDURE — 25010000002 DEXAMETHASONE PER 1 MG: Performed by: NURSE ANESTHETIST, CERTIFIED REGISTERED

## 2020-03-03 PROCEDURE — 25010000002 FENTANYL CITRATE (PF) 100 MCG/2ML SOLUTION: Performed by: NURSE ANESTHETIST, CERTIFIED REGISTERED

## 2020-03-03 PROCEDURE — 25010000002 ONDANSETRON PER 1 MG: Performed by: NURSE ANESTHETIST, CERTIFIED REGISTERED

## 2020-03-03 PROCEDURE — 81025 URINE PREGNANCY TEST: CPT | Performed by: PLASTIC SURGERY

## 2020-03-03 RX ORDER — PROMETHAZINE HYDROCHLORIDE 25 MG/ML
6.25 INJECTION, SOLUTION INTRAMUSCULAR; INTRAVENOUS ONCE AS NEEDED
Status: ACTIVE | OUTPATIENT
Start: 2020-03-03

## 2020-03-03 RX ORDER — DOXYCYCLINE HYCLATE 100 MG/1
100 TABLET, DELAYED RELEASE ORAL 2 TIMES DAILY
Qty: 14 TABLET | Refills: 0 | Status: SHIPPED | OUTPATIENT
Start: 2020-03-03 | End: 2020-03-10

## 2020-03-03 RX ORDER — ONDANSETRON 2 MG/ML
INJECTION INTRAMUSCULAR; INTRAVENOUS AS NEEDED
Status: DISCONTINUED | OUTPATIENT
Start: 2020-03-03 | End: 2020-03-03 | Stop reason: SURG

## 2020-03-03 RX ORDER — SODIUM CHLORIDE 0.9 % (FLUSH) 0.9 %
10 SYRINGE (ML) INJECTION AS NEEDED
Status: DISCONTINUED | OUTPATIENT
Start: 2020-03-03 | End: 2020-03-03 | Stop reason: HOSPADM

## 2020-03-03 RX ORDER — SCOLOPAMINE TRANSDERMAL SYSTEM 1 MG/1
1 PATCH, EXTENDED RELEASE TRANSDERMAL ONCE
Status: DISCONTINUED | OUTPATIENT
Start: 2020-03-03 | End: 2020-03-03 | Stop reason: HOSPADM

## 2020-03-03 RX ORDER — FENTANYL CITRATE 50 UG/ML
INJECTION, SOLUTION INTRAMUSCULAR; INTRAVENOUS AS NEEDED
Status: DISCONTINUED | OUTPATIENT
Start: 2020-03-03 | End: 2020-03-03 | Stop reason: SURG

## 2020-03-03 RX ORDER — PROMETHAZINE HYDROCHLORIDE 25 MG/1
25 SUPPOSITORY RECTAL ONCE AS NEEDED
Status: ACTIVE | OUTPATIENT
Start: 2020-03-03

## 2020-03-03 RX ORDER — SODIUM CHLORIDE, SODIUM LACTATE, POTASSIUM CHLORIDE, CALCIUM CHLORIDE 600; 310; 30; 20 MG/100ML; MG/100ML; MG/100ML; MG/100ML
9 INJECTION, SOLUTION INTRAVENOUS CONTINUOUS
Status: DISCONTINUED | OUTPATIENT
Start: 2020-03-03 | End: 2020-03-03 | Stop reason: HOSPADM

## 2020-03-03 RX ORDER — OXYCODONE HYDROCHLORIDE AND ACETAMINOPHEN 5; 325 MG/1; MG/1
1-2 TABLET ORAL EVERY 4 HOURS PRN
Qty: 20 TABLET | Refills: 0 | Status: SHIPPED | OUTPATIENT
Start: 2020-03-03 | End: 2020-05-07

## 2020-03-03 RX ORDER — BACITRACIN ZINC AND POLYMYXIN B SULFATE 500; 10000 [USP'U]/G; [USP'U]/G
OINTMENT TOPICAL AS NEEDED
Status: DISCONTINUED | OUTPATIENT
Start: 2020-03-03 | End: 2020-03-03 | Stop reason: HOSPADM

## 2020-03-03 RX ORDER — IBUPROFEN 400 MG/1
400 TABLET ORAL EVERY 6 HOURS PRN
COMMUNITY

## 2020-03-03 RX ORDER — FENTANYL CITRATE 50 UG/ML
50 INJECTION, SOLUTION INTRAMUSCULAR; INTRAVENOUS
Status: ACTIVE | OUTPATIENT
Start: 2020-03-03

## 2020-03-03 RX ORDER — LIDOCAINE HYDROCHLORIDE 10 MG/ML
INJECTION, SOLUTION EPIDURAL; INFILTRATION; INTRACAUDAL; PERINEURAL AS NEEDED
Status: DISCONTINUED | OUTPATIENT
Start: 2020-03-03 | End: 2020-03-03 | Stop reason: SURG

## 2020-03-03 RX ORDER — LIDOCAINE HYDROCHLORIDE 10 MG/ML
0.5 INJECTION, SOLUTION EPIDURAL; INFILTRATION; INTRACAUDAL; PERINEURAL ONCE AS NEEDED
Status: COMPLETED | OUTPATIENT
Start: 2020-03-03 | End: 2020-03-03

## 2020-03-03 RX ORDER — DEXAMETHASONE SODIUM PHOSPHATE 4 MG/ML
INJECTION, SOLUTION INTRA-ARTICULAR; INTRALESIONAL; INTRAMUSCULAR; INTRAVENOUS; SOFT TISSUE AS NEEDED
Status: DISCONTINUED | OUTPATIENT
Start: 2020-03-03 | End: 2020-03-03 | Stop reason: SURG

## 2020-03-03 RX ORDER — PROPOFOL 10 MG/ML
VIAL (ML) INTRAVENOUS AS NEEDED
Status: DISCONTINUED | OUTPATIENT
Start: 2020-03-03 | End: 2020-03-03 | Stop reason: SURG

## 2020-03-03 RX ORDER — ONDANSETRON 4 MG/1
4 TABLET, FILM COATED ORAL EVERY 8 HOURS PRN
Qty: 20 TABLET | Refills: 1 | Status: SHIPPED | OUTPATIENT
Start: 2020-03-03 | End: 2020-10-28

## 2020-03-03 RX ORDER — MEPERIDINE HYDROCHLORIDE 50 MG/ML
INJECTION INTRAMUSCULAR; INTRAVENOUS; SUBCUTANEOUS AS NEEDED
Status: DISCONTINUED | OUTPATIENT
Start: 2020-03-03 | End: 2020-03-03 | Stop reason: SURG

## 2020-03-03 RX ORDER — HYDROMORPHONE HYDROCHLORIDE 1 MG/ML
0.5 INJECTION, SOLUTION INTRAMUSCULAR; INTRAVENOUS; SUBCUTANEOUS
Status: ACTIVE | OUTPATIENT
Start: 2020-03-03 | End: 2020-03-14

## 2020-03-03 RX ORDER — FAMOTIDINE 20 MG/1
20 TABLET, FILM COATED ORAL ONCE
Status: COMPLETED | OUTPATIENT
Start: 2020-03-03 | End: 2020-03-03

## 2020-03-03 RX ORDER — SODIUM CHLORIDE 0.9 % (FLUSH) 0.9 %
10 SYRINGE (ML) INJECTION EVERY 12 HOURS SCHEDULED
Status: DISCONTINUED | OUTPATIENT
Start: 2020-03-03 | End: 2020-03-03 | Stop reason: HOSPADM

## 2020-03-03 RX ORDER — PROMETHAZINE HYDROCHLORIDE 25 MG/1
25 TABLET ORAL ONCE AS NEEDED
Status: ACTIVE | OUTPATIENT
Start: 2020-03-03

## 2020-03-03 RX ORDER — FAMOTIDINE 10 MG/ML
20 INJECTION, SOLUTION INTRAVENOUS ONCE
Status: CANCELLED | OUTPATIENT
Start: 2020-03-03 | End: 2020-03-03

## 2020-03-03 RX ORDER — CEFAZOLIN SODIUM 2 G/100ML
2 INJECTION, SOLUTION INTRAVENOUS ONCE
Status: COMPLETED | OUTPATIENT
Start: 2020-03-03 | End: 2020-03-03

## 2020-03-03 RX ADMIN — MEPERIDINE HYDROCHLORIDE 50 MG: 50 INJECTION, SOLUTION INTRAMUSCULAR; INTRAVENOUS; SUBCUTANEOUS at 17:25

## 2020-03-03 RX ADMIN — LIDOCAINE HYDROCHLORIDE 50 MG: 10 INJECTION, SOLUTION EPIDURAL; INFILTRATION; INTRACAUDAL; PERINEURAL at 16:11

## 2020-03-03 RX ADMIN — FENTANYL CITRATE 100 MCG: 50 INJECTION, SOLUTION INTRAMUSCULAR; INTRAVENOUS at 16:11

## 2020-03-03 RX ADMIN — PROPOFOL 200 MG: 10 INJECTION, EMULSION INTRAVENOUS at 16:11

## 2020-03-03 RX ADMIN — SODIUM CHLORIDE, POTASSIUM CHLORIDE, SODIUM LACTATE AND CALCIUM CHLORIDE 9 ML/HR: 600; 310; 30; 20 INJECTION, SOLUTION INTRAVENOUS at 13:21

## 2020-03-03 RX ADMIN — DEXAMETHASONE SODIUM PHOSPHATE 8 MG: 4 INJECTION, SOLUTION INTRAMUSCULAR; INTRAVENOUS at 16:17

## 2020-03-03 RX ADMIN — PROPOFOL 75 MCG/KG/MIN: 10 INJECTION, EMULSION INTRAVENOUS at 16:15

## 2020-03-03 RX ADMIN — CEFAZOLIN SODIUM 2 G: 2 INJECTION, SOLUTION INTRAVENOUS at 16:07

## 2020-03-03 RX ADMIN — ONDANSETRON 4 MG: 2 INJECTION INTRAMUSCULAR; INTRAVENOUS at 16:46

## 2020-03-03 RX ADMIN — LIDOCAINE HYDROCHLORIDE 0.3 ML: 10 INJECTION, SOLUTION EPIDURAL; INFILTRATION; INTRACAUDAL; PERINEURAL at 13:21

## 2020-03-03 RX ADMIN — FAMOTIDINE 20 MG: 20 TABLET ORAL at 13:10

## 2020-03-03 RX ADMIN — SCOPALAMINE 1 PATCH: 1 PATCH, EXTENDED RELEASE TRANSDERMAL at 15:03

## 2020-03-03 NOTE — ANESTHESIA PROCEDURE NOTES
Airway  Urgency: elective    Date/Time: 3/3/2020 4:11 PM  Airway not difficult    General Information and Staff    Patient location during procedure: OR  CRNA: Faby Vásquez CRNA    Indications and Patient Condition  Indications for airway management: airway protection    Preoxygenated: yes  MILS maintained throughout  Mask difficulty assessment: 1 - vent by mask    Final Airway Details  Final airway type: supraglottic airway      Successful airway: I-gel  Size 4    Number of attempts at approach: 1  Assessment: lips, teeth, and gum same as pre-op    Additional Comments  Pt to OR 6. Pt moved self to OR table. ASA monitors applied. Pre-O2 with 100%. SIVI. LMA placed atraumatic. +ETCO2. +BBS.

## 2020-03-03 NOTE — BRIEF OP NOTE
BREAST IMPLANT REVISION AND/OR REMOVAL  Progress Note    Ina Ram  3/3/2020    Pre-op Diagnosis:   Exposed left breast implant       Post-Op Diagnosis Codes:   same    Procedure/CPT® Codes:      Procedure(s):  REMOVE LEFT RECONSTRUCTED BREAST IMPLANT WITH NO REPLACEMENT    Surgeon(s):  Stephan Cowan MD    Anesthesia: General    Staff:   Circulator: Paramjit Zambrano RN  Scrub Person: Prabhu Shah  Assistant: Domingo Choudhury PA-C    Estimated Blood Loss: minimal    Urine Voided: * No values recorded between 3/3/2020  4:05 PM and 3/3/2020  4:57 PM *    Specimens:                None          Drains:   Closed/Suction Drain 1 Lateral;Left Breast Bulb 15 Fr. (Active)   Status Other (Comment) 3/3/2020  4:35 PM     МАРИЯ x 1     Findings: exposed implant, no obvious purulence.     Complications: none immediate      Stephan Cowan MD     Date: 3/3/2020  Time: 4:58 PM

## 2020-03-03 NOTE — H&P
Pre-Op H&P  Ina Ram  [id number]  1980    Chief complaint: History of breast cancer    HPI:    1/21/20 Op note:  The patient is a 39-year-old female who was diagnosed with left-sided breast cancer and referred to me by Dr. PHILIPPE. The patient underwent reconstruction and had recurrence in her skin. This was treated with wide excision and radiation. She continued to have PET scans that showed concern for continued cancer. Therefore she was taken to the OR today by Dr. PHILIPPE for a wide local excision of the area and reconstruction by myself. I discussed at length that radiated skin does not heal well and that her implant may become exposed and infected. She understood and elected to proceed.     3/3/20:  Follows with Dr. Malloy.  Now with exposed left breast implant and here today to undergo removal of left reconstructed breast implant with no replacement    Review of Systems:  General ROS: negative for chills, fever or skin lesions;  No changes since last office visit  Cardiovascular ROS: no chest pain or dyspnea on exertion  Respiratory ROS: no cough, shortness of breath, or wheezing    Allergies: No Known Allergies    Home Meds:    No current facility-administered medications on file prior to encounter.      Current Outpatient Medications on File Prior to Encounter   Medication Sig Dispense Refill   • Acetaminophen (TYLENOL 8 HOUR PO) Take 1 tablet by mouth Daily As Needed.     • ibuprofen (ADVIL,MOTRIN) 400 MG tablet Take 400 mg by mouth Every 6 (Six) Hours As Needed for Mild Pain  or Moderate Pain .     • Multiple Vitamins-Minerals (MULTIVITAL PO) Take 1 tablet by mouth Daily.     • capecitabine (XELODA) 500 MG chemo tablet Take 4 tablets by mouth 2 (Two) Times a Day. Take 4 tab(s) in AM and 4 tab(s) in PM on days 1-14, then off for 7 days. 112 tablet 7   • ondansetron (ZOFRAN) 4 MG tablet Take 1 tablet by mouth Every 8 (Eight) Hours As Needed for Nausea or Vomiting. 20 tablet 1   • ondansetron ODT  (ZOFRAN-ODT) 8 MG disintegrating tablet Take 1 tablet by mouth Every 8 (Eight) Hours As Needed for Nausea or Vomiting. 40 tablet 5   • oxyCODONE-acetaminophen (PERCOCET) 5-325 MG per tablet Take 1-2 tablets by mouth Every 4 (Four) Hours As Needed (Pain). 15 tablet 0       PMH:   Past Medical History:   Diagnosis Date   • Breast cancer (CMS/HCC) 2018    left   • Cancer (CMS/HCC)     breast cancer    • Drug therapy 07/2018    breast cancer   • Hx of radiation therapy 04/2019    breast cancer     PSH:    Past Surgical History:   Procedure Laterality Date   • AUGMENTATION MAMMAPLASTY Bilateral 2018    bilat mastectomy w/ reconstruction   • BREAST BIOPSY Left 2/19/2019    Procedure: EXCISIONAL BIOPSY OF LEFT BREAST MASS;  Surgeon: Josie Chaudhary MD;  Location:  [facility];  Service: General   • BREAST RECONSTRUCTION, BREAST TISSUE EXPANDER INSERTION Bilateral 11/27/2018    Procedure: BREAST RECONSTRUCTION, BREAST TISSUE EXPANDER INSERTION BILATERAL;  Surgeon: Stephan Cowan MD;  Location:  [facility];  Service: Plastics   • BREAST RECONSTRUCTION, BREAST TISSUE EXPANDER REMOVAL, IMPLANT INSERTION Bilateral 2/19/2019    Procedure: BILATERAL RECONSTRUCTED BREAST TISSUE EXPANDER EXCHANGE FOR PERM IMPLANT;  Surgeon: Stephan Cowan MD;  Location:  [facility];  Service: Plastics   • FLAP TRUNK Left 1/21/2020    Procedure: COMPLEX CLOSURE ON LEFT AXILLA;  Surgeon: Stephan Cowan MD;  Location:  [facility];  Service: Plastics   • MASTECTOMY Bilateral 11/27/2018    w/ reconstruction   • MASTECTOMY Left 1/21/2020    Procedure: WIDE EXCISION LEFT BREAST CANCER;  Surgeon: Josie Chaudhary MD;  Location:  [facility];  Service: General   • MASTECTOMY W/ SENTINEL NODE BIOPSY Bilateral 11/27/2018    Procedure: BREAST MASTECTOMY BILATERAL  WITH LEFT SENTINEL NODE BIOPSY;  Surgeon: Josie Chaudhary MD;  Location:  [facility];  Service: General   • PORTACATH PLACEMENT      Removed 6/21/19   • TONSILLECTOMY Bilateral  "1985   • WISDOM TOOTH EXTRACTION      at least 2 [id number]      Social History:   Tobacco:   Social History     Tobacco Use   Smoking Status Never Smoker   Smokeless Tobacco Never Used      Alcohol:     Social History     Substance and Sexual Activity   Alcohol Use No       Vitals:           /77 (BP Location: Right arm, Patient Position: Lying)   Pulse 89   Temp 97.7 °F (36.5 °C) (Temporal)   Resp 18   Ht 175.3 cm (69\")   Wt 80.7 kg (178 lb)   LMP 02/10/2020   SpO2 99%   BMI 26.29 kg/m²     Physical Exam:  General Appearance:    Alert, cooperative, no distress, appears stated age   Head:    Normocephalic, without obvious abnormality, atraumatic   Lungs:     Clear to auscultation bilaterally, respirations unlabored    Heart:   Regular rate and rhythm, S1 and S2 normal, no murmur, rub    or gallop    Abdomen:    Soft, non-tender.  +bowel sounds   Breast Exam:    deferred   Genitalia:    deferred   Extremities:   Extremities normal, atraumatic, no cyanosis or edema   Skin:   Skin color, texture, turgor normal, no rashes or lesions   Neurologic:   Grossly intact   Results Review  LABS:  Lab Results   Component Value Date    WBC 5.50 02/06/2020    HGB 13.0 02/06/2020    HCT 39.3 02/06/2020    MCV 99.6 (H) 02/06/2020     02/06/2020    NEUTROABS 3.70 02/06/2020    GLUCOSE 99 02/06/2020    BUN 9 02/06/2020    CREATININE 0.69 02/06/2020    EGFRIFNONA 95 02/06/2020     02/06/2020    K 3.8 02/06/2020     02/06/2020    CO2 26.0 02/06/2020    CALCIUM 9.5 02/06/2020    ALBUMIN 4.70 02/06/2020    AST 14 02/06/2020    ALT 19 02/06/2020    BILITOT 0.3 02/06/2020       RADIOLOGY:  Imaging Results (Last 72 Hours)     ** No results found for the last 72 hours. **          I reviewed the patient's new clinical results.    Impression:  History of left-sided breast cancer s/p bilateral mastectomy/reconstruction and had recurrence in her skin s/p wide excision and radiation.  Positive margins s/p WIDE " EXCISION LEFT BREAST CANCER 1/21/20 with complex closure of left axilla.  Now with exposed left breast implant    Plan: Removal of left reconstructed breast implant with no replacement    Ирина Becker, APRN   3/3/2020   1:08 PM

## 2020-03-03 NOTE — ANESTHESIA POSTPROCEDURE EVALUATION
Patient: Ina Ram    Procedure Summary     Date:  03/03/20 Room / [facility]:   [facility]    Anesthesia Start:  1605 Anesthesia Stop:      Procedure:  REMOVE LEFT RECONSTRUCTED BREAST IMPLANT WITH NO REPLACEMENT (Left Breast) Diagnosis:      Surgeon:  Stephan Cowan MD Provider:  Ramy Knight MD    Anesthesia Type:  general ASA Status:  2          Anesthesia Type: general    Vitals  Vitals Value Taken Time   /71 3/3/2020  5:25 PM   Temp 97 °F (36.1 °C) 3/3/2020  5:25 PM   Pulse 93 3/3/2020  5:28 PM   Resp 18 3/3/2020  5:25 PM   SpO2 100 % 3/3/2020  5:28 PM   Vitals shown include unvalidated device data.        Post Anesthesia Care and Evaluation    Patient location during evaluation: PACU  Patient participation: complete - patient participated  Level of consciousness: awake and responsive to verbal stimuli  Pain score: 4  Pain management: adequate  Airway patency: patent  Anesthetic complications: No anesthetic complications  PONV Status: none  Cardiovascular status: stable  Respiratory status: acceptable, nasal cannula and spontaneous ventilation  Hydration status: stable    Comments: Pt transferred to PACU with O2. Vital signs stable. Report to PACU RN and care accepted. Pt shivering in spite of warm blankets.

## 2020-03-03 NOTE — ANESTHESIA PREPROCEDURE EVALUATION
Anesthesia Evaluation     NPO Solid Status: > 8 hours  NPO Liquid Status: > 6 hours           Airway   TM distance: >3 FB  Neck ROM: full  No difficulty expected  Dental      Pulmonary     breath sounds clear to auscultation  (-) asthma, not a smoker  Cardiovascular     Rhythm: regular    (-) hypertension, angina, murmur      Neuro/Psych  (-) seizures  GI/Hepatic/Renal/Endo    (-) liver disease, no renal disease, diabetes    Musculoskeletal     Abdominal    Substance History      OB/GYN          Other        ROS/Med Hx Other: H/O breast cancer, with mastectomy and implant on the left.  Developed recurrence in the skin. Then WLE performed.  Now has exposed implant which will be removed                  Anesthesia Plan    ASA 2     general   (GA  )  intravenous induction     Anesthetic plan, all risks, benefits, and alternatives have been provided, discussed and informed consent has been obtained with: patient.    Plan discussed with CRNA.

## 2020-03-03 NOTE — OP NOTE
DATE OF PROCEDURE:  03/03/20    PREOPERATIVE DIAGNOSES:  1.  Exposed left breast implant.  2.  Left breast incisional wound.    POSTOPERATIVE DIAGNOSES:  1.  Exposed left breast implant.  2.  Left breast incisional wound.    PROCEDURES PERFORMED:  1.  Removal of exposed left breast implant.  2.  Complex closure of left breast wound measuring 5 cm.    SURGEON: Stephan Cowan MD    ASSISTANT: Domingo Choudhury PA-C    ANESTHESIA: General.    INDICATIONS FOR PROCEDURE: The patient is a 39-year-old female who had undergone bilateral mastectomies and reconstruction. She radiation after recurrence. She then had recurrence again and re-excision of cancer. She presented to my office yesterday with an open incision and an exposed breast implant. Due to the exposed implant and AlloDerm, she was taken to the operating room for removal of the implant without placement of a new implant. She understood all the risks and benefits of the procedure, including need for future procedures, placement of a drain and elected to proceed.    OPERATIVE FINDINGS: No obvious purulence within her left breast pocket.    DESCRIPTION OF PROCEDURE: The patient was seen in preoperative holding, marked and then taken to the operating room by anesthesia. Informed consent was signed and on the chart. The patient was placed supine on the operating table and general anesthesia was induced. Once verified, the case was then turned over to the surgical service. The patient had her chest and abdomen prepped and draped in normal sterile fashion. A timeout was called and all parties were in agreement, including nursing and anesthesia. Preoperative antibiotics were given. We began on the left side, making the incision from the previous cancer re-excision incision with a 15 blade scalpel. I dissected down and removed the implant. I then used the scalpel to debride the necrotic skin. At this point, copious antibiotic irrigation was used in the left breast pocket.  There was nothing purulent that was noticed at this time. A 15-Georgian round [place] drain was placed in the pocket and the incision was then closed in layers with a 2-0 Vicryl in Flor’s fascia and a 3-0 Nylon in simple interrupted fashion. She then had Kerlix fluffs and a surgical bra placed. She was awoken, extubated and taken to PACU in stable condition. All sponge, instrument counts are correct. I, Dr. Stephan Cowan, was present and scrubbed for the entire procedure.    SPECIMENS REMOVED: None    ESTIMATED BLOOD LOSS: 10 cc    DRAINS PLACED: МАРИЯ drain x 1.    COMPLICATIONS: None immediate.      Stephan Cowan MD  03/03/20  4:59 PM

## 2020-05-04 ENCOUNTER — HOSPITAL ENCOUNTER (OUTPATIENT)
Dept: PET IMAGING | Facility: HOSPITAL | Age: 40
Discharge: HOME | End: 2020-05-04

## 2020-05-04 ENCOUNTER — HOSPITAL ENCOUNTER (OUTPATIENT)
Dept: PET IMAGING | Facility: HOSPITAL | Age: 40
Discharge: HOME | End: 2020-05-04
Admitting: NURSE PRACTITIONER

## 2020-05-04 DIAGNOSIS — C50.412 MALIGNANT NEOPLASM OF UPPER-OUTER QUADRANT OF LEFT BREAST IN FEMALE, ESTROGEN RECEPTOR POSITIVE (HCC): ICD-10-CM

## 2020-05-04 DIAGNOSIS — Z17.0 MALIGNANT NEOPLASM OF UPPER-OUTER QUADRANT OF LEFT BREAST IN FEMALE, ESTROGEN RECEPTOR POSITIVE (HCC): ICD-10-CM

## 2020-05-04 LAB — GLUCOSE BLDC GLUCOMTR-MCNC: 86 MG/DL (ref 70–130)

## 2020-05-04 PROCEDURE — 0 FLUDEOXYGLUCOSE F18 SOLUTION: Performed by: NURSE PRACTITIONER

## 2020-05-04 PROCEDURE — A9552 F18 FDG: HCPCS | Performed by: NURSE PRACTITIONER

## 2020-05-04 PROCEDURE — 82962 GLUCOSE BLOOD TEST: CPT

## 2020-05-04 PROCEDURE — 78815 PET IMAGE W/CT SKULL-THIGH: CPT

## 2020-05-04 RX ADMIN — FLUDEOXYGLUCOSE F18 1 DOSE: 300 INJECTION INTRAVENOUS at 12:20

## 2020-05-07 ENCOUNTER — OFFICE VISIT (OUTPATIENT)
Dept: ONCOLOGY | Facility: CLINIC | Age: 40
End: 2020-05-07

## 2020-05-07 DIAGNOSIS — C50.412 MALIGNANT NEOPLASM OF UPPER-OUTER QUADRANT OF LEFT BREAST IN FEMALE, ESTROGEN RECEPTOR POSITIVE (HCC): Primary | ICD-10-CM

## 2020-05-07 DIAGNOSIS — Z17.0 MALIGNANT NEOPLASM OF UPPER-OUTER QUADRANT OF LEFT BREAST IN FEMALE, ESTROGEN RECEPTOR POSITIVE (HCC): Primary | ICD-10-CM

## 2020-05-07 PROCEDURE — 99442 PR PHYS/QHP TELEPHONE EVALUATION 11-20 MIN: CPT | Performed by: INTERNAL MEDICINE

## 2020-05-07 NOTE — PROGRESS NOTES
Visit attempted via video; patient unable to connect.  Start time 11:30.  End time 11:47.        PROBLEM LIST:  1. [id number] ER weakly positive (1-3%), KS negative, Her2 negative invasive ductal carcinoma of the left breast  A) presented with a palpable breast mass.  Biopsy showed ER+, KS- Her2- IDC, grade 3.  B) neoadjuvant TAC started July 2018.  Complicated by neutropenic fever after cycle 4.  Dose reduced to 80% for cycles 5 and 6.  C) bilateral mastectomy on 11/27/18.  Pathology showed a 1.7 cm high grade IDC with superficial invasion into the skeletal muscle.  0/2 SLN involved.  D) PET scan on 1/14/2019 showed a single focus of hypermetabolic activity along the left lateral aspect of the breast and chest wall.  On 2/19/2019 she underwent resection of this lesion which showed residual high-grade invasive ductal carcinoma with tumor extending to the deep margin.  Further resection not possible.  Adjuvant radiotherapy completed 5/10/19.  E) 6/15/2019 she started adjuvant Xeloda.   F) PET scan on 2/19/2019 showed persistent hypermetabolic focus adjacent to the left implant.  On 1/21/2020 she underwent wide excision which showed infiltrating, poorly differentiated ductal adenocarcinoma with positive margins, specimen with extended margins negative      Subjective      Chief Complaint: follow up breast cancer     HISTORY OF PRESENT ILLNESS:   Ina Ram returns for follow-up.  She had her implant removed in March.  She says that the incision opened up after that and it has now healed but still feels very fragile.  She is hesitant to move her arm freely because of this.  Otherwise she has been feeling well.    Past Medical History, Past Surgical History, Social History, Family History have been reviewed and are without significant changes except as mentioned.    Review of Systems   A comprehensive 14 point review of systems was performed and was negative except as mentioned.    Medications:  The current  medication list was reviewed in the EMR    ALLERGIES:  No Known Allergies    Objective      There were no vitals taken for this visit.     Performance Status: 0    General:  female in no acute distress  Neuro: alert and oriented  Psych: mood and affect appropriate    NM Pet Skull Base To Mid Thigh  Narrative: EXAMINATION: NM PET, SKULL BASE TO MID THIGH-05/04/2020:     INDICATION: Followup breast cancer, high risk for recurrence;  C50.412-Malignant neoplasm of upper-outer quadrant of left female  breast; Z17.0-Estrogen receptor positive status (ER+), restaging breast  cancer.     TECHNIQUE: With fasting blood glucose level of 86 mg/dL, a total of  12.67 mCi of FDG was administered via the right upper arm. Following  appropriate delay, PET-CT imaging was obtained from the skull vertex to  the mid thighs bilaterally and fused multiplanar images were  reconstructed. The CT scan is an unenhanced study and used for reference  to the PET scan only and should not be considered a diagnostic CT scan.  PET-CT imaging was reviewed in the axial, coronal, and sagittal planes  as well as within the cine mode.     The radiation dose reduction device was turned on as low as reasonably  achievable for each scan per ALARA protocol.     COMPARISON: Subsequent exam for treatment with prior study available for  comparison dated 12/19/2019.     FINDINGS: Normal variant activity identified within the oropharynx and  muscles of phonation. Normal variant activity identified in the region  of the vocal cords. Normal salivary gland activity is identified. No  hypermetabolic activity identified within the neck to suggest evidence  of metastatic disease. The implant on the left has been surgically  removed in the interval. Previously seen focus of uptake of tracer seen  along the lateral aspect of the implant is decreased in activity in the  interval. The low-level activity seen in the lateral aspect of the chest  wall correlates to  postsurgical changes and low-level maximum SUV of  2.8. This area previously had maximum SUV of 3.3 and has decreased in  the interval. The remainder of the chest is unremarkable with normal  variant activity identified within the heart. Normal variant activity  seen within the GI and  tract. Normal uptake of tracer is seen within  the abdomen and pelvis. The upper thighs are unremarkable.     Impression: Removal of the implant identified on the left in the  interval with decrease seen in activity previously noted within a small  focus of area in the lateral aspect of the left breast and left chest  wall. There is low-level activity correlating to the postsurgical  changes in this area with continued improvement in the interval. The  remainder of the examination demonstrates a negative PET-CT scan.      D:  05/04/2020  E:  05/05/2020     This report was finalized on 5/5/2020 11:48 AM by Dr. Mari Cueva MD.       I personally reviewed the pet scan images.    Assessment/Plan   Ina Ram is a 39 y.o. year old female with a stage II B ER weakly positive HER-2 negative breast cancer who returns for follow up.       We reviewed her PET scan results.  This shows improvement in the SUV values in the chest wall soft tissue.  There are no solid nodules.  No evidence of recurrent malignancy at this point.  I do think she remains high risk and I will continue to monitor closely.  I would recommend a repeat PET scan in 3 months for follow-up.  I do not think a CT scan would provide the needed level of detail of the soft tissues around her chest wall and axilla.    She has follow-up with Dr. Cowan later this month for ongoing care of her wound.          Fifi Malloy MD  [facility]    5/7/2020

## 2020-08-11 ENCOUNTER — HOSPITAL ENCOUNTER (OUTPATIENT)
Dept: PET IMAGING | Facility: HOSPITAL | Age: 40
Discharge: HOME | End: 2020-08-11
Admitting: INTERNAL MEDICINE

## 2020-08-11 ENCOUNTER — HOSPITAL ENCOUNTER (OUTPATIENT)
Dept: PET IMAGING | Facility: HOSPITAL | Age: 40
Discharge: HOME | End: 2020-08-11

## 2020-08-11 DIAGNOSIS — C50.412 MALIGNANT NEOPLASM OF UPPER-OUTER QUADRANT OF LEFT BREAST IN FEMALE, ESTROGEN RECEPTOR POSITIVE (HCC): ICD-10-CM

## 2020-08-11 DIAGNOSIS — Z17.0 MALIGNANT NEOPLASM OF UPPER-OUTER QUADRANT OF LEFT BREAST IN FEMALE, ESTROGEN RECEPTOR POSITIVE (HCC): ICD-10-CM

## 2020-08-11 LAB — GLUCOSE BLDC GLUCOMTR-MCNC: 101 MG/DL (ref 70–130)

## 2020-08-11 PROCEDURE — 78815 PET IMAGE W/CT SKULL-THIGH: CPT

## 2020-08-11 PROCEDURE — A9552 F18 FDG: HCPCS | Performed by: INTERNAL MEDICINE

## 2020-08-11 PROCEDURE — 82962 GLUCOSE BLOOD TEST: CPT

## 2020-08-11 PROCEDURE — 0 FLUDEOXYGLUCOSE F18 SOLUTION: Performed by: INTERNAL MEDICINE

## 2020-08-11 RX ADMIN — FLUDEOXYGLUCOSE F18 1 DOSE: 300 INJECTION INTRAVENOUS at 11:12

## 2020-08-13 ENCOUNTER — OFFICE VISIT (OUTPATIENT)
Dept: ONCOLOGY | Facility: CLINIC | Age: 40
End: 2020-08-13

## 2020-08-13 ENCOUNTER — LAB (OUTPATIENT)
Dept: LAB | Facility: HOSPITAL | Age: 40
End: 2020-08-13

## 2020-08-13 ENCOUNTER — MDT ASSESSMENT (OUTPATIENT)
Dept: ONCOLOGY | Facility: CLINIC | Age: 40
End: 2020-08-13

## 2020-08-13 VITALS
WEIGHT: 181 LBS | DIASTOLIC BLOOD PRESSURE: 69 MMHG | SYSTOLIC BLOOD PRESSURE: 139 MMHG | OXYGEN SATURATION: 100 % | BODY MASS INDEX: 26.81 KG/M2 | HEIGHT: 69 IN | TEMPERATURE: 98.7 F | RESPIRATION RATE: 16 BRPM | HEART RATE: 76 BPM

## 2020-08-13 DIAGNOSIS — Z17.0 MALIGNANT NEOPLASM OF UPPER-OUTER QUADRANT OF LEFT BREAST IN FEMALE, ESTROGEN RECEPTOR POSITIVE (HCC): ICD-10-CM

## 2020-08-13 DIAGNOSIS — Z17.0 MALIGNANT NEOPLASM OF UPPER-OUTER QUADRANT OF LEFT BREAST IN FEMALE, ESTROGEN RECEPTOR POSITIVE (HCC): Primary | ICD-10-CM

## 2020-08-13 DIAGNOSIS — C50.412 MALIGNANT NEOPLASM OF UPPER-OUTER QUADRANT OF LEFT BREAST IN FEMALE, ESTROGEN RECEPTOR POSITIVE (HCC): ICD-10-CM

## 2020-08-13 DIAGNOSIS — C50.412 MALIGNANT NEOPLASM OF UPPER-OUTER QUADRANT OF LEFT BREAST IN FEMALE, ESTROGEN RECEPTOR POSITIVE (HCC): Primary | ICD-10-CM

## 2020-08-13 LAB
ALBUMIN SERPL-MCNC: 4.7 G/DL (ref 3.5–5.2)
ALBUMIN/GLOB SERPL: 2 G/DL
ALP SERPL-CCNC: 52 U/L (ref 39–117)
ALT SERPL W P-5'-P-CCNC: 15 U/L (ref 1–33)
ANION GAP SERPL CALCULATED.3IONS-SCNC: 9 MMOL/L (ref 5–15)
AST SERPL-CCNC: 14 U/L (ref 1–32)
BILIRUB SERPL-MCNC: 0.4 MG/DL (ref 0–1.2)
BUN SERPL-MCNC: 10 MG/DL (ref 6–20)
BUN/CREAT SERPL: 12.7 (ref 7–25)
CALCIUM SPEC-SCNC: 9.6 MG/DL (ref 8.6–10.5)
CHLORIDE SERPL-SCNC: 105 MMOL/L (ref 98–107)
CO2 SERPL-SCNC: 24 MMOL/L (ref 22–29)
CREAT SERPL-MCNC: 0.79 MG/DL (ref 0.57–1)
ERYTHROCYTE [DISTWIDTH] IN BLOOD BY AUTOMATED COUNT: 13.3 % (ref 12.3–15.4)
GFR SERPL CREATININE-BSD FRML MDRD: 81 ML/MIN/1.73
GLOBULIN UR ELPH-MCNC: 2.4 GM/DL
GLUCOSE SERPL-MCNC: 86 MG/DL (ref 65–99)
HCT VFR BLD AUTO: 40.9 % (ref 34–46.6)
HGB BLD-MCNC: 13.6 G/DL (ref 12–15.9)
LYMPHOCYTES # BLD AUTO: 1.8 10*3/MM3 (ref 0.7–3.1)
LYMPHOCYTES NFR BLD AUTO: 26.4 % (ref 19.6–45.3)
MCH RBC QN AUTO: 31.2 PG (ref 26.6–33)
MCHC RBC AUTO-ENTMCNC: 33.3 G/DL (ref 31.5–35.7)
MCV RBC AUTO: 93.8 FL (ref 79–97)
MONOCYTES # BLD AUTO: 0.4 10*3/MM3 (ref 0.1–0.9)
MONOCYTES NFR BLD AUTO: 5.5 % (ref 5–12)
NEUTROPHILS NFR BLD AUTO: 4.6 10*3/MM3 (ref 1.7–7)
NEUTROPHILS NFR BLD AUTO: 68.1 % (ref 42.7–76)
PLATELET # BLD AUTO: 198 10*3/MM3 (ref 140–450)
PMV BLD AUTO: 7.1 FL (ref 6–12)
POTASSIUM SERPL-SCNC: 4 MMOL/L (ref 3.5–5.2)
PROT SERPL-MCNC: 7.1 G/DL (ref 6–8.5)
RBC # BLD AUTO: 4.36 10*6/MM3 (ref 3.77–5.28)
SODIUM SERPL-SCNC: 138 MMOL/L (ref 136–145)
WBC # BLD AUTO: 6.7 10*3/MM3 (ref 3.4–10.8)

## 2020-08-13 PROCEDURE — 85025 COMPLETE CBC W/AUTO DIFF WBC: CPT

## 2020-08-13 PROCEDURE — 99214 OFFICE O/P EST MOD 30 MIN: CPT | Performed by: INTERNAL MEDICINE

## 2020-08-13 PROCEDURE — 36415 COLL VENOUS BLD VENIPUNCTURE: CPT

## 2020-08-13 PROCEDURE — 80053 COMPREHEN METABOLIC PANEL: CPT

## 2020-08-13 NOTE — H&P (VIEW-ONLY)
CANCER CONFERENCE AGENDA  DATE:  08/14/2020    SPECIALTY:  Multidisciplinary  PRESENTER:   Fifi Malloy M.D.  SITE:   Breast (recurrent)         HPI:  40 YOWF who presented with abnormal surveillance imaging.       PLACE OF RESIDENCE:   [place], [place]       SOCIAL HISTORY:  Lifetime non-smoker.  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       PAST MEDICAL HISTORY:  High grade ductal carcinoma of left breast (2018) s/p neoadjuvant chemotherapy with dose-dense TAC started in July, 2018 complicated by neutropenic fever after cycle #4 with dose reduced to 80% for cycles #5 and 6.  Following chemotherapy, patient had bilateral simple mastectomy with left SLN biopsy (11/27/2018 - Josie Chaudhary M.D.) ER positive/KS negative/HER-2/cy negative, tumor site left UOQ, tumor size 1.7 cm, B-R score 8/9, no lymphovascular invasion, skin/nipple uninvolved, surgical margins uninvolved, 0/2 lymph nodes.  Presented with a palpable breast mass on self-examination.       On 01/14/2019, PET scan showed a single focus of hypermetabolic activity along the lateral aspect of the breast and chest wall.  On 02/19/2019, patient underwent resection of this lesion with pathology positive for residual high-grade invasive ductal carcinoma with tumor extending to the deep margin with further resection not possible.  On 05/10/2019, completed adjuvant radiotherapy.  On 06/15/2019, started adjuvant chemotherapy with Xeloda.         PAST SURGICAL HISTORY:  Removal of exposed left breast implant with complex closure of left breast wound (5 cm), left breast mass excision with extended margins (01/20/2020), left breast wide excisional skin biopsy (02/19/2019), bilateral simple mastectomy with left SLN biopsy (11/27/2018), left breast US-guided needle biopsy (0605/18 - [place], [place]).               NEXT GEN SEQUENCING:      IMAGING:  Nm Pet Skull Base To Mid  Thigh    Result Date: 8/11/2020  1. Changing appearance of patient's left chest wall uptake, overall with smaller area of abnormal uptake, but with a small central focus of increased activity, possibly low level inflammation. No visible associated CT scan change.  2. Mild interval enlargement left ovary, and increasing activity, which may simply reflect underlying ovulation cycle. Given its apparent interval change, prior history of breast cancer, short interval follow-up imaging might be considered, perhaps evaluation with pelvic ultrasound.  3. Mildly increased activity in the right adnexa or pelvic sidewall with no definite abnormality.  D:  08/11/2020 E:  08/11/2020         CLINICAL STAGE:     SURGICAL PROCEDURE:     PATHOLOGY:      TREATMENT PLAN DISCUSSION:      Medical Oncology:      Radiation Oncology: Yes      Surgical:      Clinical Trials:       Other Referrals: CT guided biopsy first    Treatment Recommendations/Referrals:     EVIDENCE BASED NATIONAL TREATMENT GUIDELINES:  National Comprehensive Cancer Network      Please discuss clinical/working stage, TNM, Stage Group, National Treatment Guidelines, and Prognostic Indicators.      Privileged and Confidential Patient Safety Work Product:  The information contained herein has been compiled as part of the [facility] Patient Safety Evaluation System and is deemed to be a Patient Safety Work Product and is privileged and confidential.  Disclaimer:  Multidisciplinary cancer conferences provide consultative services to formulate an effective treatment plan for patients and include physician representation from medical oncology, radiation oncology, radiology, surgery, and pathology.  AJCC or other appropriate staging is discussed, along with site-specific prognostic indicators and treatment planning used by evidence-based treatment guidelines.  Treatment plans which are discussed during conference may/may not necessarily be followed by the patient's  managing physician(s), as there may be other factors taken into consideration which impact the treatment plan.  The specific treatment plan is ultimately determined on an individual basis by the patient and the physician(s) involved in his/her care.

## 2020-08-13 NOTE — PROGRESS NOTES
CANCER CONFERENCE AGENDA  DATE:  08/14/2020    SPECIALTY:  Multidisciplinary  PRESENTER:   Fifi Malloy M.D.  SITE:   Breast (recurrent)         HPI:  40 YOWF who presented with abnormal surveillance imaging.       PLACE OF RESIDENCE:   [place], [place]       SOCIAL HISTORY:  Lifetime non-smoker.  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       PAST MEDICAL HISTORY:  High grade ductal carcinoma of left breast (2018) s/p neoadjuvant chemotherapy with dose-dense TAC started in July, 2018 complicated by neutropenic fever after cycle #4 with dose reduced to 80% for cycles #5 and 6.  Following chemotherapy, patient had bilateral simple mastectomy with left SLN biopsy (11/27/2018 - Josie Chaudhary M.D.) ER positive/[place] negative/HER-2/cy negative, tumor site left UOQ, tumor size 1.7 cm, B-R score 8/9, no lymphovascular invasion, skin/nipple uninvolved, surgical margins uninvolved, 0/2 lymph nodes.  Presented with a palpable breast mass on self-examination.       On 01/14/2019, PET scan showed a single focus of hypermetabolic activity along the lateral aspect of the breast and chest wall.  On 02/19/2019, patient underwent resection of this lesion with pathology positive for residual high-grade invasive ductal carcinoma with tumor extending to the deep margin with further resection not possible.  On 05/10/2019, completed adjuvant radiotherapy.  On 06/15/2019, started adjuvant chemotherapy with Xeloda.         PAST SURGICAL HISTORY:  Removal of exposed left breast implant with complex closure of left breast wound (5 cm), left breast mass excision with extended margins (01/20/2020), left breast wide excisional skin biopsy (02/19/2019), bilateral simple mastectomy with left SLN biopsy (11/27/2018), left breast US-guided needle biopsy (0605/18 - [place], [place]).               NEXT GEN SEQUENCING:      IMAGING:  Nm Pet Skull Base To Mid  Thigh    Result Date: 8/11/2020  1. Changing appearance of patient's left chest wall uptake, overall with smaller area of abnormal uptake, but with a small central focus of increased activity, possibly low level inflammation. No visible associated CT scan change.  2. Mild interval enlargement left ovary, and increasing activity, which may simply reflect underlying ovulation cycle. Given its apparent interval change, prior history of breast cancer, short interval follow-up imaging might be considered, perhaps evaluation with pelvic ultrasound.  3. Mildly increased activity in the right adnexa or pelvic sidewall with no definite abnormality.  D:  08/11/2020 E:  08/11/2020         CLINICAL STAGE:     SURGICAL PROCEDURE:     PATHOLOGY:      TREATMENT PLAN DISCUSSION:      Medical Oncology:      Radiation Oncology: Yes      Surgical:      Clinical Trials:       Other Referrals: CT guided biopsy first    Treatment Recommendations/Referrals:     EVIDENCE BASED NATIONAL TREATMENT GUIDELINES:  National Comprehensive Cancer Network      Please discuss clinical/working stage, TNM, Stage Group, National Treatment Guidelines, and Prognostic Indicators.      Privileged and Confidential Patient Safety Work Product:  The information contained herein has been compiled as part of the [facility] Patient Safety Evaluation System and is deemed to be a Patient Safety Work Product and is privileged and confidential.  Disclaimer:  Multidisciplinary cancer conferences provide consultative services to formulate an effective treatment plan for patients and include physician representation from medical oncology, radiation oncology, radiology, surgery, and pathology.  AJCC or other appropriate staging is discussed, along with site-specific prognostic indicators and treatment planning used by evidence-based treatment guidelines.  Treatment plans which are discussed during conference may/may not necessarily be followed by the patient's  managing physician(s), as there may be other factors taken into consideration which impact the treatment plan.  The specific treatment plan is ultimately determined on an individual basis by the patient and the physician(s) involved in his/her care.

## 2020-08-13 NOTE — PROGRESS NOTES
PROBLEM LIST:  1. [id number] ER weakly positive (1-3%), AL negative, Her2 negative invasive ductal carcinoma of the left breast  A) presented with a palpable breast mass.  Biopsy showed ER+, AL- Her2- IDC, grade 3.  B) neoadjuvant TAC started July 2018.  Complicated by neutropenic fever after cycle 4.  Dose reduced to 80% for cycles 5 and 6.  C) bilateral mastectomy on 11/27/18.  Pathology showed a 1.7 cm high grade IDC with superficial invasion into the skeletal muscle.  0/2 SLN involved.  D) PET scan on 1/14/2019 showed a single focus of hypermetabolic activity along the left lateral aspect of the breast and chest wall.  On 2/19/2019 she underwent resection of this lesion which showed residual high-grade invasive ductal carcinoma with tumor extending to the deep margin.  Further resection not possible.  Adjuvant radiotherapy completed 5/10/19.  E) 6/15/2019 she started adjuvant Xeloda.   F) PET scan on 2/19/2019 showed persistent hypermetabolic focus adjacent to the left implant.  On 1/21/2020 she underwent wide excision which showed infiltrating, poorly differentiated ductal adenocarcinoma with positive margins, specimen with extended margins negative      Subjective      Chief Complaint: follow up breast cancer     HISTORY OF PRESENT ILLNESS:   Ina Ram returns for follow-up.  She says she has been feeling pretty good.  In the past week or so she has felt a little more tightness or discomfort in the left chest wall area.  She wonders if this is just increased anxiety because of her upcoming scan.  Otherwise no complaints.    Past Medical History, Past Surgical History, Social History, Family History have been reviewed and are without significant changes except as mentioned.    Review of Systems   A comprehensive 14 point review of systems was performed and was negative except as mentioned.    Medications:  The current medication list was reviewed in the EMR    ALLERGIES:  No Known Allergies    Objective  "     /69   Pulse 76   Temp 98.7 °F (37.1 °C) (Temporal)   Resp 16   Ht 175.3 cm (69.02\")   Wt 82.1 kg (181 lb)   SpO2 100%   BMI 26.72 kg/m²      Performance Status: 0    General: well appearing female in no acute distress  Neuro: alert and oriented  HEENT: sclera anicteric, oropharynx clear  Lymphatics: no cervical, supraclavicular, or axillary adenopathy  Chest: Status post left mastectomy.  There is significant scar tissue and firmness around the lateral edge of the mastectomy incision.  No discrete mass.  Cardiovascular: regular rate and rhythm, no murmurs  Lungs: clear to auscultation bilaterally  Abdomen: soft, nontender, nondistended.  No palpable organomegaly  Extremeties: no lower extremity edema  Skin: no rashes, lesions, bruising, or petechiae  Psych: mood and affect appropriate      NM Pet Skull Base To Mid Thigh  Narrative: EXAMINATION: NM PET SKULL BASE TO MID THIGH- 08/11/2020     INDICATION: breast cancer; C50.412-Malignant neoplasm of upper-outer  quadrant of left female breast; Z17.0-Estrogen receptor positive status  (ER+)     TECHNIQUE: With fasting blood glucose level of 101 mg/dL, a total of  12.0 mCi of FDG was administered via right upper arm vein. Following  appropriate delay, PET and CT images were obtained from the level of the  skull vertex to the mid thighs and fused multiplanar images  reconstructed. The CT scan is an unenhanced low-dose study for reference  to the PET scan only and does not constitute a standard diagnostic CT  scan.     The radiation dose reduction device was turned on for each scan per the  ALARA (As Low as Reasonably Achievable) protocol.      COMPARISON: 05/04/2020     FINDINGS: Patient history indicates breast cancer diagnosed in 2018  status post chemotherapy in 2018 and radiotherapy in 2019. The  05/04/2020 PET scan report indicated previous left-sided implant  removal, decreasing left breast and chest wall activity. Negative PET  scan elsewhere.   "   Today's study shows low level persistent chest wall activity, similar to  the prior exam. Small focus of activity is seen in the right and left  pelvic sidewall regions which appear to be new. There is normal and  normal variant activity elsewhere on the 3-D series.     Multiplanar images show no significant asymmetry of uptake in the brain.  In the neck, there is normal variant salivary gland uptake and thyroid  gland uptake, but no pathologic adenopathy is appreciated. In the chest,  there is no evidence of hypermetabolic mediastinal hilar or pulmonary  parenchymal disease. Small focus of activity associated with patient's  left-sided mastectomy site is smaller, but with visually greater  activity in a very small focus, maximal SUV 4.2, previously 2.8, perhaps  localized mild inflammation. There is no visible underlying mass, and  this area is actually less prominent on CT scan than previously. No new  chest wall abnormality is appreciated.     Below the diaphragm, there is expected heterogeneous activity in the  liver. Adrenal glands are non hypermetabolic. No hypermetabolic solid  organ disease or mass is seen in the upper abdomen.     Regarding the lower abdomen and pelvis, there is expected GI and   tract uptake. There is increased uptake regional to what appears to be  the left ovary, previously small, today relatively enlarged at 3 cm,  prior dimensions up to 2.1 cm. Maximal SUV here is 5.1, previously 3.7.  Low-level right adnexal activity is within or adjacent to the  normal-appearing right ovary, possibly with an adjacent vascular  structure in the adnexa. Maximum SUV is 3.3 today and 2.5 previously. No  other hypermetabolic node or mass is seen. No pathologic marrow space  uptake is seen.     Impression: 1. Changing appearance of patient's left chest wall uptake, overall with  smaller area of abnormal uptake, but with a small central focus of  increased activity, possibly low level inflammation. No  visible  associated CT scan change.     2. Mild interval enlargement left ovary, and increasing activity, which  may simply reflect underlying ovulation cycle. Given its apparent  interval change, prior history of breast cancer, short interval  follow-up imaging might be considered, perhaps evaluation with pelvic  ultrasound.     3. Mildly increased activity in the right adnexa or pelvic sidewall with  no definite abnormality.     D:  08/11/2020  E:  08/11/2020       I personally reviewed the pet scan images.    Assessment/Plan   Ina Ram is a 40 y.o. year old female with a stage II B ER weakly positive HER-2 negative breast cancer who returns for follow up.       We reviewed her PET scan results.  The SUV value in the chest wall has increased slightly but the area of abnormality has decreased.  On exam she has significant firmness and scar tissue in this area and it is difficult to determine if there is any discrete mass.  I think she does have fairly significant risk for recurrence.  I will present her case at tumor board and contact her following that.  My question at this point is whether any further imaging such as ultrasound would be beneficial.    Assuming we do not move forward with any additional testing at this point I would like to see her back in 6 weeks for repeat exam.    Her PET scan did show a few abnormalities in the pelvis of indeterminate significance.  We will monitor this on future imaging.        I spent 25 minutes with the patient. I spent > 50% percent of this time counseling and discussing prognosis, diagnostic testing, evaluation, current status and management.        Fifi Malloy MD  [facility]ematology and On[facility]    8/13/2020

## 2020-08-14 DIAGNOSIS — C50.412 MALIGNANT NEOPLASM OF UPPER-OUTER QUADRANT OF LEFT BREAST IN FEMALE, ESTROGEN RECEPTOR POSITIVE (HCC): Primary | ICD-10-CM

## 2020-08-14 DIAGNOSIS — Z17.0 MALIGNANT NEOPLASM OF UPPER-OUTER QUADRANT OF LEFT BREAST IN FEMALE, ESTROGEN RECEPTOR POSITIVE (HCC): Primary | ICD-10-CM

## 2020-08-14 NOTE — PROGRESS NOTES
Called patient re: tumor board discussion this morning.  Chest wall mass/hypermetabolic area likely accessible by CT guided biopsy.  Will schedule this.  Possibility of radiation seed implants also discussed if this is confirmed to be malignancy.  Patient wants to proceed.

## 2020-08-19 ENCOUNTER — TELEPHONE (OUTPATIENT)
Dept: ONCOLOGY | Facility: CLINIC | Age: 40
End: 2020-08-19

## 2020-08-19 NOTE — TELEPHONE ENCOUNTER
I called patient back and she had questions about when COVID should be done and where should she go.  I told her that CT scheduling should be contacting her about that.  She has not heard from them yet.  I gave her the number to scheduling to ask if she could get the testing done closer to home.  She will call them to see what they say.

## 2020-08-19 NOTE — TELEPHONE ENCOUNTER
Patient would like to speak to Dr. Malloy's nurse.  She has biopsy scheduled next Tuesday.  She needs a COVID test before then.  She has questions about this.    [phone]

## 2020-08-24 ENCOUNTER — TELEPHONE (OUTPATIENT)
Dept: INFUSION THERAPY | Facility: HOSPITAL | Age: 40
End: 2020-08-24

## 2020-08-24 NOTE — TELEPHONE ENCOUNTER
"Pt reports that she has had a \"rapid\" test done and that she has the results which were negative.  Instructed patient to bring a copy of those results with her in the morning when she arrives. Pt verbalizes understanding.   "

## 2020-08-25 ENCOUNTER — HOSPITAL ENCOUNTER (OUTPATIENT)
Dept: CT IMAGING | Facility: HOSPITAL | Age: 40
Discharge: HOME | End: 2020-08-25
Admitting: RADIOLOGY

## 2020-08-25 VITALS
HEART RATE: 77 BPM | BODY MASS INDEX: 26.66 KG/M2 | RESPIRATION RATE: 12 BRPM | OXYGEN SATURATION: 100 % | WEIGHT: 180 LBS | SYSTOLIC BLOOD PRESSURE: 107 MMHG | DIASTOLIC BLOOD PRESSURE: 78 MMHG | HEIGHT: 69 IN | TEMPERATURE: 97.5 F

## 2020-08-25 DIAGNOSIS — C50.412 MALIGNANT NEOPLASM OF UPPER-OUTER QUADRANT OF LEFT BREAST IN FEMALE, ESTROGEN RECEPTOR POSITIVE (HCC): ICD-10-CM

## 2020-08-25 DIAGNOSIS — Z17.0 MALIGNANT NEOPLASM OF UPPER-OUTER QUADRANT OF LEFT BREAST IN FEMALE, ESTROGEN RECEPTOR POSITIVE (HCC): ICD-10-CM

## 2020-08-25 LAB
APTT PPP: 32.9 SECONDS (ref 24–37)
INR PPP: 1 (ref 0.85–1.16)
PROTHROMBIN TIME: 12.9 SECONDS (ref 11.5–14)

## 2020-08-25 PROCEDURE — 77012 CT SCAN FOR NEEDLE BIOPSY: CPT

## 2020-08-25 PROCEDURE — 25010000002 FENTANYL CITRATE (PF) 100 MCG/2ML SOLUTION: Performed by: RADIOLOGY

## 2020-08-25 PROCEDURE — 88305 TISSUE EXAM BY PATHOLOGIST: CPT | Performed by: INTERNAL MEDICINE

## 2020-08-25 PROCEDURE — 85610 PROTHROMBIN TIME: CPT | Performed by: RADIOLOGY

## 2020-08-25 PROCEDURE — 25010000002 MIDAZOLAM PER 1 MG: Performed by: RADIOLOGY

## 2020-08-25 PROCEDURE — 85730 THROMBOPLASTIN TIME PARTIAL: CPT | Performed by: RADIOLOGY

## 2020-08-25 RX ORDER — FENTANYL CITRATE 50 UG/ML
INJECTION, SOLUTION INTRAMUSCULAR; INTRAVENOUS
Status: COMPLETED | OUTPATIENT
Start: 2020-08-25 | End: 2020-08-25

## 2020-08-25 RX ORDER — FENTANYL CITRATE 50 UG/ML
INJECTION, SOLUTION INTRAMUSCULAR; INTRAVENOUS
Status: DISPENSED
Start: 2020-08-25 | End: 2020-08-25

## 2020-08-25 RX ORDER — SODIUM CHLORIDE 0.9 % (FLUSH) 0.9 %
3 SYRINGE (ML) INJECTION EVERY 12 HOURS SCHEDULED
Status: DISCONTINUED | OUTPATIENT
Start: 2020-08-25 | End: 2020-08-26 | Stop reason: HOSPADM

## 2020-08-25 RX ORDER — MIDAZOLAM HYDROCHLORIDE 1 MG/ML
INJECTION INTRAMUSCULAR; INTRAVENOUS
Status: DISPENSED
Start: 2020-08-25 | End: 2020-08-25

## 2020-08-25 RX ORDER — SODIUM CHLORIDE 0.9 % (FLUSH) 0.9 %
10 SYRINGE (ML) INJECTION AS NEEDED
Status: DISCONTINUED | OUTPATIENT
Start: 2020-08-25 | End: 2020-08-26 | Stop reason: HOSPADM

## 2020-08-25 RX ORDER — LIDOCAINE HYDROCHLORIDE 10 MG/ML
20 INJECTION, SOLUTION EPIDURAL; INFILTRATION; INTRACAUDAL; PERINEURAL ONCE
Status: COMPLETED | OUTPATIENT
Start: 2020-08-25 | End: 2020-08-25

## 2020-08-25 RX ORDER — MIDAZOLAM HYDROCHLORIDE 1 MG/ML
INJECTION INTRAMUSCULAR; INTRAVENOUS
Status: COMPLETED | OUTPATIENT
Start: 2020-08-25 | End: 2020-08-25

## 2020-08-25 RX ADMIN — MIDAZOLAM 1 MG: 1 INJECTION INTRAMUSCULAR; INTRAVENOUS at 15:07

## 2020-08-25 RX ADMIN — LIDOCAINE HYDROCHLORIDE 20 ML: 10 INJECTION, SOLUTION EPIDURAL; INFILTRATION; INTRACAUDAL; PERINEURAL at 15:16

## 2020-08-25 RX ADMIN — FENTANYL CITRATE 50 MCG: 50 INJECTION, SOLUTION INTRAMUSCULAR; INTRAVENOUS at 15:07

## 2020-08-25 RX ADMIN — MIDAZOLAM 1 MG: 1 INJECTION INTRAMUSCULAR; INTRAVENOUS at 15:17

## 2020-08-25 RX ADMIN — FENTANYL CITRATE 50 MCG: 50 INJECTION, SOLUTION INTRAMUSCULAR; INTRAVENOUS at 15:17

## 2020-08-25 NOTE — NURSING NOTE
Pt placed on cardiac monitor and 2LNC w/ ETCO2 monitoring. Pt's vital signs stable. Images taken and reviewed by Dr. Shea. Pt sedated with a total of 2mg Midazolam and 100mcg Fentanyl IVP. Pt tolerated procedure well and vital signs remained stable. Specimens labeled and sent to lab. Report called to [facility].

## 2020-08-25 NOTE — NURSING NOTE
Pt discharged from ir dept s/p chest wall biopsy.  Pt tolerated procedure without complications.  Discharge instructions reviewed with patient who verbalizes understanding.  Pt transported to exit via wheelchair per tech.

## 2020-08-26 ENCOUNTER — TELEPHONE (OUTPATIENT)
Dept: INFUSION THERAPY | Facility: HOSPITAL | Age: 40
End: 2020-08-26

## 2020-08-26 NOTE — POST-PROCEDURE NOTE
Interventional Radiology Operative Note    Date: 08/25/20     Time: 22:22     Pre-op Diagnosis: High grade ductal carcinoma of left breast (2018)   Post-op Diagnosis: Same    Procedure: Left chest wall biopsy (hypermetabolic focus)    Surgeon: BRISEIDA Shea M.D.  Assistants: None    Sedation: Moderate sedation    Estimated Blood Loss (EBL): Trace     Urine Output (UOP): N/A (short procedure)    IVF: N/A (short procedure)    Findings: fibrous region    Specimens: 18 gauge core X 4    Complications: No immediate    Disposition: Recovery. Stable

## 2020-08-31 ENCOUNTER — TELEPHONE (OUTPATIENT)
Dept: ONCOLOGY | Facility: CLINIC | Age: 40
End: 2020-08-31

## 2020-08-31 NOTE — TELEPHONE ENCOUNTER
Dr. Malloy told patient that she was referring her to Dr. Alfaro for an appointment.  She said that if patient did not hear from his office by today to call our office to let us know. She has not heard anything.    [phone]

## 2020-09-03 ENCOUNTER — TELEMEDICINE (OUTPATIENT)
Dept: ONCOLOGY | Facility: CLINIC | Age: 40
End: 2020-09-03

## 2020-09-03 VITALS — HEIGHT: 69 IN | BODY MASS INDEX: 26.58 KG/M2

## 2020-09-03 DIAGNOSIS — Z17.0 MALIGNANT NEOPLASM OF UPPER-OUTER QUADRANT OF LEFT BREAST IN FEMALE, ESTROGEN RECEPTOR POSITIVE (HCC): Primary | ICD-10-CM

## 2020-09-03 DIAGNOSIS — C50.412 MALIGNANT NEOPLASM OF UPPER-OUTER QUADRANT OF LEFT BREAST IN FEMALE, ESTROGEN RECEPTOR POSITIVE (HCC): Primary | ICD-10-CM

## 2020-09-03 PROCEDURE — 99213 OFFICE O/P EST LOW 20 MIN: CPT | Performed by: INTERNAL MEDICINE

## 2020-09-03 NOTE — PROGRESS NOTES
Visit conducted via video on zoom.     PROBLEM LIST:  1. [id number] ER weakly positive (1-3%), IN negative, Her2 negative invasive ductal carcinoma of the left breast  A) presented with a palpable breast mass.  Biopsy showed ER+, IN- Her2- IDC, grade 3.  B) neoadjuvant TAC started July 2018.  Complicated by neutropenic fever after cycle 4.  Dose reduced to 80% for cycles 5 and 6.  C) bilateral mastectomy on 11/27/18.  Pathology showed a 1.7 cm high grade IDC with superficial invasion into the skeletal muscle.  0/2 SLN involved.  D) PET scan on 1/14/2019 showed a single focus of hypermetabolic activity along the left lateral aspect of the breast and chest wall.  On 2/19/2019 she underwent resection of this lesion which showed residual high-grade invasive ductal carcinoma with tumor extending to the deep margin.  Further resection not possible.  Adjuvant radiotherapy completed 5/10/19.  E) 6/15/2019 she started adjuvant Xeloda.   F) PET scan on 2/19/2019 showed persistent hypermetabolic focus adjacent to the left implant.  On 1/21/2020 she underwent wide excision which showed infiltrating, poorly differentiated ductal adenocarcinoma with positive margins, specimen with extended margins negative      Subjective      Chief Complaint: follow up breast cancer     HISTORY OF PRESENT ILLNESS:   Ina Ram returns for follow-up.  She met with Dr. PHILIPPE yesterday.  She understands that the plan is to try to resect the area of recurrence.  She says she is okay to go ahead with this.  She is waiting to hear more details.  No other complaints today.    Past Medical History, Past Surgical History, Social History, Family History have been reviewed and are without significant changes except as mentioned.    Review of Systems   A comprehensive 14 point review of systems was performed and was negative except as mentioned.    Medications:  The current medication list was reviewed in the EMR    ALLERGIES:  No Known  "Allergies    Objective      Ht 175.3 cm (69\")   BMI 26.58 kg/m²      Performance Status: 0    General: well appearing female in no acute distress  Neuro: alert and oriented  HEENT: sclera anicteric, oropharynx clear  Lymphatics: No masses by inspection  Lungs: Respiratory effort appears normal  Extremeties: no edema by inspection  Skin: no rashes, lesions, bruising, or petechiae  Psych: mood and affect appropriate          Assessment/Plan   Ina Ram is a 40 y.o. year old female with a stage II B ER weakly positive HER-2 negative breast cancer who returns for follow up.       She again has local recurrence in the chest wall.  I think is reasonable to go ahead with surgery.  We discussed that given her prior surgeries and radiation treatments healing a wound in this area may be more complicated.  I think she will continue to need close monitoring following surgery for evidence of recurrence.    We discussed that we are doing molecular testing on the tumor to look for PDL 1 status as well as any other targetable mutations.  She understands that even with surgical resection states she will still be at risk for further recurrence of her tumor and I want to have this information to help make treatment decisions down the road if needed.    Assuming that she does have surgical resection I will plan to see her back in about 2 months with a repeat PET scan.  She knows to call with any questions or concerns.      I spent 15 minutes with the patient. I spent > 50% percent of this time counseling and discussing prognosis, diagnostic testing, evaluation, current status and management.        Fifi Malloy MD  [facility]    9/3/2020             "

## 2020-09-08 ENCOUNTER — TRANSCRIBE ORDERS (OUTPATIENT)
Dept: ADMINISTRATIVE | Facility: HOSPITAL | Age: 40
End: 2020-09-08

## 2020-09-08 ENCOUNTER — APPOINTMENT (OUTPATIENT)
Dept: PREADMISSION TESTING | Facility: HOSPITAL | Age: 40
End: 2020-09-08

## 2020-09-08 DIAGNOSIS — C50.812 MALIGNANT NEOPLASM OF OVERLAPPING SITES OF LEFT FEMALE BREAST, UNSPECIFIED ESTROGEN RECEPTOR STATUS (HCC): Primary | ICD-10-CM

## 2020-09-11 ENCOUNTER — HOSPITAL ENCOUNTER (OUTPATIENT)
Dept: CT IMAGING | Facility: HOSPITAL | Age: 40
Discharge: HOME | End: 2020-09-11

## 2020-09-11 ENCOUNTER — LAB REQUISITION (OUTPATIENT)
Dept: LAB | Facility: HOSPITAL | Age: 40
End: 2020-09-11

## 2020-09-11 DIAGNOSIS — C50.919 BREAST CANCER IN FEMALE (HCC): ICD-10-CM

## 2020-09-11 DIAGNOSIS — C50.812 MALIGNANT NEOPLASM OF OVERLAPPING SITES OF LEFT FEMALE BREAST (HCC): ICD-10-CM

## 2020-09-11 DIAGNOSIS — C50.812 MALIGNANT NEOPLASM OF OVERLAPPING SITES OF LEFT FEMALE BREAST, UNSPECIFIED ESTROGEN RECEPTOR STATUS (HCC): ICD-10-CM

## 2020-09-11 PROCEDURE — 88305 TISSUE EXAM BY PATHOLOGIST: CPT | Performed by: SURGERY

## 2020-09-11 PROCEDURE — 71260 CT THORAX DX C+: CPT

## 2020-09-11 PROCEDURE — 88307 TISSUE EXAM BY PATHOLOGIST: CPT | Performed by: SURGERY

## 2020-09-11 PROCEDURE — 88360 TUMOR IMMUNOHISTOCHEM/MANUAL: CPT | Performed by: SURGERY

## 2020-09-11 PROCEDURE — 88342 IMHCHEM/IMCYTCHM 1ST ANTB: CPT | Performed by: SURGERY

## 2020-09-11 PROCEDURE — 71250 CT THORAX DX C-: CPT

## 2020-09-11 PROCEDURE — 25010000002 IOPAMIDOL 61 % SOLUTION: Performed by: SURGERY

## 2020-09-11 RX ADMIN — IOPAMIDOL 85 ML: 612 INJECTION, SOLUTION INTRAVENOUS at 08:24

## 2020-09-29 NOTE — PROGRESS NOTES
Visit conducted via video on zoom.     PROBLEM LIST:  1. [id number] ER weakly positive (1-3%), MN negative, Her2 negative invasive ductal carcinoma of the left breast  A) presented with a palpable breast mass.  Biopsy showed ER+, MN- Her2- IDC, grade 3.  B) neoadjuvant TAC started July 2018.  Complicated by neutropenic fever after cycle 4.  Dose reduced to 80% for cycles 5 and 6.  C) bilateral mastectomy on 11/27/18.  Pathology showed a 1.7 cm high grade IDC with superficial invasion into the skeletal muscle.  0/2 SLN involved.  D) PET scan on 1/14/2019 showed a single focus of hypermetabolic activity along the left lateral aspect of the breast and chest wall.  On 2/19/2019 she underwent resection of this lesion which showed residual high-grade invasive ductal carcinoma with tumor extending to the deep margin.  Further resection not possible.  Adjuvant radiotherapy completed 5/10/19.  E) 6/15/2019 she started adjuvant Xeloda.   F) PET scan on 2/19/2019 showed persistent hypermetabolic focus adjacent to the left implant.  On 1/21/2020 she underwent wide excision which showed infiltrating, poorly differentiated ductal adenocarcinoma with positive margins, specimen with extended margins negative  G) PET scan August 25 2020 showed hypermetabolic uptake in chest wall.  On 9/11/20, this was excised, with pathology showing a 2.1 cm poorly differentiated carcinoma, margins negative.  ER weakly positive, MN -, Her2-      Subjective      Chief Complaint: follow up breast cancer     HISTORY OF PRESENT ILLNESS:   Ina Ram returns for follow-up.   She had surgery about 2 weeks ago, with resection of the mass with negative margins.  She says that it got infected over the weekend.  There is still some drainage.  She is on antibiotics and the redness is a little better.  Not much pain currently.     Past Medical History, Past Surgical History, Social History, Family History have been reviewed and are without significant  "changes except as mentioned.    Review of Systems   A comprehensive 14 point review of systems was performed and was negative except as mentioned.    Medications:  The current medication list was reviewed in the EMR    ALLERGIES:  No Known Allergies    Objective      /75   Pulse 94   Temp 97.4 °F (36.3 °C)   Resp 16   Ht 175.3 cm (69\")   Wt 80.7 kg (178 lb)   SpO2 99%   BMI 26.29 kg/m²      Performance Status: 0    General: well appearing female in no acute distress  Neuro: alert and oriented  HEENT: sclera anicteric, oropharynx clear  Lymphatics: no cervical, supraclavicular, or axillary adenopathy  Left chest wall: incision with stitches.  Some erythema, no active drainage.  Cardiovascular: regular rate and rhythm, no murmurs  Lungs: clear to auscultation bilaterally  Abdomen: soft, nontender, nondistended.  No palpable organomegaly  Extremeties: no lower extremity edema  Skin: no rashes, lesions, bruising, or petechiae  Psych: mood and affect appropriate          Assessment/Plan   Ina Ram is a 40 y.o. year old female with a stage II B ER weakly positive HER-2 negative breast cancer who returns for follow up.       She again has local recurrence in the chest wall - this is the third sine her mastectomy.  We discussed that she remains at risk for subsequent recurrence.  There is no data to guide the use of either radiotherapy or chemotherapy in this situation, but it may be reasonable to consider adjuvant chemotherapy.  We discussed the option of carboplatin and gemzar, and if we did this I would plan for 4 cyles or 12 weeks.  We discussed potential side effects of carboplatin and gemcitabine including myelosuppression, fatigue, nausea/vomiting, constipation, infusion reaction, and neutropenic fever.      STRATA testing on there tumor biopsy had inadequate tissue.  We will try to repeat this testing on the surgical specimen.    Repeat PET scan currently scheduled forin November.      I spent " 25 minutes with the patient. I spent > 50% percent of this time counseling and discussing prognosis, diagnostic testing, evaluation, current status and management.        Fifi Malloy MD  [facility] Hematology and Oncology    9/30/2020

## 2020-09-30 ENCOUNTER — OFFICE VISIT (OUTPATIENT)
Dept: ONCOLOGY | Facility: CLINIC | Age: 40
End: 2020-09-30

## 2020-09-30 VITALS
SYSTOLIC BLOOD PRESSURE: 133 MMHG | DIASTOLIC BLOOD PRESSURE: 75 MMHG | HEIGHT: 69 IN | BODY MASS INDEX: 26.36 KG/M2 | WEIGHT: 178 LBS | RESPIRATION RATE: 16 BRPM | TEMPERATURE: 97.4 F | OXYGEN SATURATION: 99 % | HEART RATE: 94 BPM

## 2020-09-30 DIAGNOSIS — Z17.0 MALIGNANT NEOPLASM OF UPPER-OUTER QUADRANT OF LEFT BREAST IN FEMALE, ESTROGEN RECEPTOR POSITIVE (HCC): Primary | ICD-10-CM

## 2020-09-30 DIAGNOSIS — C50.412 MALIGNANT NEOPLASM OF UPPER-OUTER QUADRANT OF LEFT BREAST IN FEMALE, ESTROGEN RECEPTOR POSITIVE (HCC): Primary | ICD-10-CM

## 2020-09-30 PROCEDURE — 99214 OFFICE O/P EST MOD 30 MIN: CPT | Performed by: INTERNAL MEDICINE

## 2020-09-30 RX ORDER — SULFAMETHOXAZOLE AND TRIMETHOPRIM 800; 160 MG/1; MG/1
1 TABLET ORAL 2 TIMES DAILY
COMMUNITY
Start: 2020-09-28 | End: 2020-12-16

## 2020-10-28 ENCOUNTER — OFFICE VISIT (OUTPATIENT)
Dept: ONCOLOGY | Facility: CLINIC | Age: 40
End: 2020-10-28

## 2020-10-28 VITALS
BODY MASS INDEX: 26.81 KG/M2 | OXYGEN SATURATION: 96 % | SYSTOLIC BLOOD PRESSURE: 141 MMHG | DIASTOLIC BLOOD PRESSURE: 66 MMHG | HEIGHT: 69 IN | TEMPERATURE: 98 F | WEIGHT: 181 LBS | RESPIRATION RATE: 16 BRPM | HEART RATE: 73 BPM

## 2020-10-28 DIAGNOSIS — C50.412 MALIGNANT NEOPLASM OF UPPER-OUTER QUADRANT OF LEFT BREAST IN FEMALE, ESTROGEN RECEPTOR POSITIVE (HCC): Primary | ICD-10-CM

## 2020-10-28 DIAGNOSIS — Z17.0 MALIGNANT NEOPLASM OF UPPER-OUTER QUADRANT OF LEFT BREAST IN FEMALE, ESTROGEN RECEPTOR POSITIVE (HCC): Primary | ICD-10-CM

## 2020-10-28 PROCEDURE — 99214 OFFICE O/P EST MOD 30 MIN: CPT | Performed by: INTERNAL MEDICINE

## 2020-10-28 RX ORDER — DEXAMETHASONE 4 MG/1
TABLET ORAL
Qty: 6 TABLET | Refills: 5 | Status: SHIPPED | OUTPATIENT
Start: 2020-10-28 | End: 2021-08-26

## 2020-10-28 RX ORDER — ONDANSETRON HYDROCHLORIDE 8 MG/1
8 TABLET, FILM COATED ORAL 3 TIMES DAILY PRN
Qty: 30 TABLET | Refills: 5 | Status: SHIPPED | OUTPATIENT
Start: 2020-10-28

## 2020-10-28 RX ORDER — LEVOTHYROXINE SODIUM 0.03 MG/1
25 TABLET ORAL EVERY MORNING
COMMUNITY
Start: 2020-10-16

## 2020-10-28 NOTE — PROGRESS NOTES
Visit conducted via video on zoom.     PROBLEM LIST:  1. [id number] ER weakly positive (1-3%), LA negative, Her2 negative invasive ductal carcinoma of the left breast  A) presented with a palpable breast mass.  Biopsy showed ER+, LA- Her2- IDC, grade 3.  B) neoadjuvant TAC started July 2018.  Complicated by neutropenic fever after cycle 4.  Dose reduced to 80% for cycles 5 and 6.  C) bilateral mastectomy on 11/27/18.  Pathology showed a 1.7 cm high grade IDC with superficial invasion into the skeletal muscle.  0/2 SLN involved.  D) PET scan on 1/14/2019 showed a single focus of hypermetabolic activity along the left lateral aspect of the breast and chest wall.  On 2/19/2019 she underwent resection of this lesion which showed residual high-grade invasive ductal carcinoma with tumor extending to the deep margin.  Further resection not possible.  Adjuvant radiotherapy completed 5/10/19.  E) 6/15/2019 she started adjuvant Xeloda.   F) PET scan on 2/19/2019 showed persistent hypermetabolic focus adjacent to the left implant.  On 1/21/2020 she underwent wide excision which showed infiltrating, poorly differentiated ductal adenocarcinoma with positive margins, specimen with extended margins negative  G) PET scan August 25 2020 showed hypermetabolic uptake in chest wall.  On 9/11/20, this was excised, with pathology showing a 2.1 cm poorly differentiated carcinoma, margins negative.  ER weakly positive, LA -, Her2-      Subjective      Chief Complaint: follow up breast cancer     HISTORY OF PRESENT ILLNESS:   Ina Ram returns for follow-up.   She says she is feeling ok.  The redness and drainage from her incision is improved, but she is still having a small amt of drainage.  She sees dr. PHILIPPE this afternoon for follow up.  She has been thinking about chemotherapy and thinks she will regret it if she does not proceed and somejhing happens down the road.      Past Medical History, Past Surgical History, Social History,  "Family History have been reviewed and are without significant changes except as mentioned.    Review of Systems   A comprehensive 14 point review of systems was performed and was negative except as mentioned.    Medications:  The current medication list was reviewed in the EMR    ALLERGIES:  No Known Allergies    Objective      /66   Pulse 73   Temp 98 °F (36.7 °C) (Temporal)   Resp 16   Ht 175.3 cm (69.02\")   Wt 82.1 kg (181 lb)   SpO2 96%   BMI 26.72 kg/m²      Performance Status: 0    General: well appearing female in no acute distress  Neuro: alert and oriented  HEENT: sclera anicteric, oropharynx clear  Lymphatics: no cervical, supraclavicular, or axillary adenopathy  Left chest wall: incision with stitches.  No erythema, stitches remaining in mid-incision  Cardiovascular: regular rate and rhythm, no murmurs  Lungs: clear to auscultation bilaterally  Abdomen: soft, nontender, nondistended.  No palpable organomegaly  Extremeties: no lower extremity edema  Skin: no rashes, lesions, bruising, or petechiae  Psych: mood and affect appropriate          Assessment/Plan   Ina Ram is a 40 y.o. year old female with a stage II B ER weakly positive HER-2 negative breast cancer who returns for follow up.       We will plan to proceed with chemotherapy with carboplatin and gemzar in hopes of reducing the risk of recurrence.  I would like to wait a few weeks to allow her more time to heal her wound.  We will tentatively plan to begin in a few weeks.  I will plan for a total of 4 cycles.  We will repeat imaging with PET scan after 2 cycles.    STRATA testing on her recent excision is pending.    Follow up prior to cycle 2.      I spent 25 minutes with the patient. I spent > 50% percent of this time counseling and discussing prognosis, diagnostic testing, evaluation, current status and management.        Fifi Malloy MD  [facility]ematology and On[facility]    10/28/2020             "

## 2020-11-03 ENCOUNTER — MDT ASSESSMENT (OUTPATIENT)
Dept: ONCOLOGY | Facility: CLINIC | Age: 40
End: 2020-11-03

## 2020-11-04 ENCOUNTER — HOSPITAL ENCOUNTER (OUTPATIENT)
Dept: ONCOLOGY | Facility: HOSPITAL | Age: 40
Discharge: HOME | End: 2020-11-04

## 2020-11-04 ENCOUNTER — OFFICE VISIT (OUTPATIENT)
Dept: ONCOLOGY | Facility: CLINIC | Age: 40
End: 2020-11-04

## 2020-11-04 ENCOUNTER — EDUCATION (OUTPATIENT)
Dept: ONCOLOGY | Facility: HOSPITAL | Age: 40
End: 2020-11-04

## 2020-11-04 VITALS
BODY MASS INDEX: 26.81 KG/M2 | RESPIRATION RATE: 16 BRPM | HEIGHT: 69 IN | SYSTOLIC BLOOD PRESSURE: 126 MMHG | TEMPERATURE: 98.3 F | DIASTOLIC BLOOD PRESSURE: 67 MMHG | WEIGHT: 181 LBS | OXYGEN SATURATION: 98 % | HEART RATE: 78 BPM

## 2020-11-04 DIAGNOSIS — C50.412 MALIGNANT NEOPLASM OF UPPER-OUTER QUADRANT OF LEFT BREAST IN FEMALE, ESTROGEN RECEPTOR POSITIVE (HCC): Primary | ICD-10-CM

## 2020-11-04 DIAGNOSIS — Z17.0 MALIGNANT NEOPLASM OF UPPER-OUTER QUADRANT OF LEFT BREAST IN FEMALE, ESTROGEN RECEPTOR POSITIVE (HCC): Primary | ICD-10-CM

## 2020-11-04 PROCEDURE — 99214 OFFICE O/P EST MOD 30 MIN: CPT | Performed by: NURSE PRACTITIONER

## 2020-11-04 RX ORDER — ALPRAZOLAM 0.5 MG/1
0.5 TABLET ORAL 2 TIMES DAILY PRN
Qty: 60 TABLET | Refills: 0 | Status: SHIPPED | OUTPATIENT
Start: 2020-11-04 | End: 2021-09-30 | Stop reason: SDUPTHER

## 2020-11-04 NOTE — PROGRESS NOTES
CHEMOTHERAPY PREPARATION    Ina Ram  [id number]  1980    Chief Complaint: chemo education     History of present illness:  Ina Ram is a 40 y.o. year old female who is here today for chemotherapy preparation and needs assessment. The patient has been diagnosed with breast cancer and is scheduled to begin treatment with CARBOplatin / Gemcitabine.     Oncology History:    Oncology/Hematology History   Malignant neoplasm of upper-outer quadrant of left breast in female, estrogen receptor positive (CMS/HCC)   6/27/2018 Initial Diagnosis    Malignant neoplasm of upper-outer quadrant of left breast in female, estrogen receptor positive (CMS/HCC)     4/1/2019 - 5/10/2019 Radiation    Radiation OncologyTreatment Course:  Ian Ram received 6000 cGy in 30 fractions to left breast via External Beam Radiation - EBRT.     11/11/2020 -  Chemotherapy    OP BREAST CARBOplatin / Gemcitabine         Past Medical History:   Diagnosis Date   • Breast cancer (CMS/HCC) 2018    left   • Cancer (CMS/HCC)     breast cancer    • Chest wall mass    • Drug therapy 07/2018    breast cancer   • Hx of radiation therapy 04/2019    breast cancer       Past Surgical History:   Procedure Laterality Date   • AUGMENTATION MAMMAPLASTY Bilateral 2018    bilat mastectomy w/ reconstruction   • BREAST BIOPSY Left 2/19/2019    Procedure: EXCISIONAL BIOPSY OF LEFT BREAST MASS;  Surgeon: Josie Chaudhary MD;  Location: [facility] OR;  Service: General   • BREAST IMPLANT SURGERY Left 3/3/2020    Procedure: RECONSTRUCTED BREAST IMPLANT REMOVAL WITH NO REPLACEMENT LEFT;  Surgeon: Stephan Cowan MD;  Location:  [facility] OR;  Service: Plastics;  Laterality: Left;   • BREAST RECONSTRUCTION, BREAST TISSUE EXPANDER INSERTION Bilateral 11/27/2018    Procedure: BREAST RECONSTRUCTION, BREAST TISSUE EXPANDER INSERTION BILATERAL;  Surgeon: Stephan Cowan MD;  Location:  [facility];  Service: Plastics   • BREAST RECONSTRUCTION, BREAST  "TISSUE EXPANDER REMOVAL, IMPLANT INSERTION Bilateral 2/19/2019    Procedure: BILATERAL RECONSTRUCTED BREAST TISSUE EXPANDER EXCHANGE FOR PERM IMPLANT;  Surgeon: Stephan Cowan MD;  Location:  [facility] OR;  Service: Plastics   • FLAP TRUNK Left 1/21/2020    Procedure: COMPLEX CLOSURE ON LEFT AXILLA;  Surgeon: Stephan Cowan MD;  Location:  [facility] OR;  Service: Plastics   • MASTECTOMY Bilateral 11/27/2018    w/ reconstruction   • MASTECTOMY Left 1/21/2020    Procedure: WIDE EXCISION LEFT BREAST CANCER;  Surgeon: Josie Chaudhary MD;  Location:  [facility];  Service: General   • MASTECTOMY W/ SENTINEL NODE BIOPSY Bilateral 11/27/2018    Procedure: BREAST MASTECTOMY BILATERAL  WITH LEFT SENTINEL NODE BIOPSY;  Surgeon: Josie Chaudhary MD;  Location:  [facility];  Service: General   • PORTACATH PLACEMENT      Removed 6/21/19   • TONSILLECTOMY Bilateral 1985   • WISDOM TOOTH EXTRACTION      at least 2 [id number]        MEDICATIONS: The current medication list was reviewed and reconciled.     Allergies:  has No Known Allergies.    Family History   Problem Relation Age of Onset   • No Known Problems Mother    • No Known Problems Father    • Breast cancer Cousin         BRCA, w METS to brain   • Prostate cancer Maternal Grandfather    • Ovarian cancer Neg Hx          Review of Systems   Constitutional: Positive for fatigue. Negative for chills and fever.   HENT: Negative.    Eyes: Negative.    Respiratory: Negative.    Cardiovascular: Negative.    Gastrointestinal: Negative.    Endocrine: Negative.    Genitourinary: Negative.    Musculoskeletal: Negative.    Skin: Negative.    Allergic/Immunologic: Negative.    Neurological: Negative.    Hematological: Negative.    Psychiatric/Behavioral: Positive for decreased concentration. The patient is nervous/anxious.        Physical Exam  Vital Signs: /67   Pulse 78   Temp 98.3 °F (36.8 °C)   Resp 16   Ht 175.3 cm (69\")   Wt 82.1 kg (181 lb)   SpO2 98%   BMI " 26.73 kg/m²   Vitals:    11/04/20 1357   PainSc: 0-No pain           General Appearance:  alert, cooperative, no apparent distress and appears stated age   Neurologic/Psychiatric: A&O x 3, gait steady, appropriate affect   HEENT:  Normocephalic, without obvious abnormality, mucous membranes moist   Lungs:   Clear to auscultation bilaterally; respirations regular, even, and unlabored bilaterally   Heart:  Regular rate and rhythm, no murmurs appreciated   Extremities: Normal, atraumatic; no clubbing, cyanosis, or edema    Skin: No rashes, lesions, or abnormal coloration noted     ECOG Performance Status: (0) Fully Active - Able to Carry On All Pre-disease Performance Without Restriction          NEEDS ASSESSMENTS    Genetics  The patient's new diagnosis and family history have been reviewed for genetic counseling needs. A genetic referral is not recommended. She had a genetic referral 6/2018.       Psychosocial  The patient has completed a PHQ-9 Depression Screening and the Distress Thermometer (DT) today.   PHQ-9 Total Score:7  . PHQ-9 results show 5-9 (Mild Depression). The patient scored their distress today as 5 on a scale of 0-10 with 0 being no distress and 10 being extreme distress.   Problems marked by the patient as being an issue for them within the last week include practical problems, emotional problems and physical problems.   Results were reviewed along with psychosocial resources offered by our cancer center. Our oncology social worker will be flagged for a DT score of 4 or above, and a same day call will be made for a score of 9 or 10. A mental health referral is recommended at this time. The patient is not accepting of a referral to [facility].   Copies of patient's questionnaires will be scanned into EMR for details and further reference.    Barriers to care  A barriers form was also completed by the patient today. We discussed services offered by our facility to help her have adequate access  "to care. The patient was given the name and card for our Oncology Social Worker, Ya Arteaga. Based upon barriers assessment today, the patient will require a follow-up call from the  to further discuss needs.   A copy of the barriers form will also be scanned into EMR for details and further reference.     VAD Assessment  The patient and I discussed planned intervenous chemotherapy as well as other IV treatments that are often needed throughout the course of treatment. These may include, but are not limited to blood transfusions, antibiotics, and IV hydration. The vasculature does appear to be adequate for multiple peripheral IVs throughout their treatment course. Discussed risks and benefits of VADs. The patient would not like to pursue Port-A-Cath insertion prior to initiation of treatment.     Advance Care Planning   ACP discussion was held with the patient during this visit. Patient does not have an advance directive, declines further assistance.  The patient and I discussed advanced care planning, \"Conversations that Matter\".   This service was offered, free of charge, for development of advance directives with a certified ACP facilitator.  The patient does not have an up-to-date advanced directive. This document is not on file with our office. The patient is not interested in an appointment with one of our facilitators to create or update their advanced directives.         Palliative Care  The patient and I discussed palliative care services. Palliative care is not the same as Hospice care. This is specialized medical care for people living with serious illness with the goal of improving quality of life for the patient and their family. [facility] has partnered with [facility] to offer our patients outpatient palliative care early along with their treatment to assist in coordination of care, symptom management, pain management, and medical decision making.  Oncology criteria for " palliative care referral is not met at this time. The patient is not interested in a palliative care consultation.     Additional Referral needs  none      CHEMOTHERAPY EDUCATION    Booklets Given: Chemotherapy and You [x]  Eating Hints [x]    Sexuality/Fertility Books [x]      Chemotherapy/Biotherapy Education Sheets: (list all that apply)  nausea management, acid reflux management, diarrhea management, Cancer resourse contacts information, skin and mouth care and vaccination information                                                                                                                                                                 Chemotherapy Regimen:   Treatment Plans     Name Type Plan Dates Plan Provider         Active    OP BREAST CARBOplatin / Gemcitabine ONCOLOGY TREATMENT  11/10/2020 - Present Fifi Malloy MD                        Assessment and Plan:    Diagnoses and all orders for this visit:    1. Malignant neoplasm of upper-outer quadrant of left breast in female, estrogen receptor positive (CMS/HCC) (Primary)  -     Ambulatory Referral to ONC Social Work  -     ALPRAZolam (Xanax) 0.5 MG tablet; Take 1 tablet by mouth 2 (Two) Times a Day As Needed for Anxiety.  Dispense: 60 tablet; Refill: 0        This was a 25 minute face-to-face visit with 20 minutes spent in  counseling and coordination of care as documented above.   The patient and I have reviewed their new cancer diagnosis and scheduled treatment plan. Needs assessment was completed including genetics, psychosocial needs, barriers to care, VAD evaluation, advanced care planning, and palliative care services. Referrals have been ordered as appropriate based upon our evaluation and patient desires.     Chemotherapy teaching was also completed today per pharmacist. Adequate time was given to answer all questions to her satisfaction. Patient is aware of her care team members and contact information if they have questions or problems  throughout the treatment course. Needs assessments and education has been completed. The patient is adequately prepared to begin treatment as scheduled.     Reviewed with patient education regarding  Zofran, dexamethasone and xanax prescriptions sent to pharmacy.     Patient very tearful during visit. I will send a prescription for Xanax for anxiety. I did suggest a referral to [facility] if symptoms do not improve.      I advised the patient that she can take Tylenol or Ibuprofen as needed for aches/pains related to cancer/treatment. I also advised patient she could use Senakot or Miralax as needed for constipation or Imodium as needed for diarrhea.       I reviewed with the patient the care team members. I also reviewed the option of the urgent care clinic through our oncology office for evaluation and management of symptoms related to treatment.        [facility] Hensley  11/04/2020

## 2020-11-04 NOTE — PLAN OF CARE
Outpatient Infusion • [address] • [address] • [place] [id number] • [phone]      CHEMOTHERAPY EDUCATION SHEET    NAME:  Ina Ram      : 1980           DATE: 20    Booklets Given: Chemotherapy and You []  Eating Hints []    Sexuality/Fertility Books []     Chemotherapy/Biotherapy Education Sheets: (list all that apply)    Carboplatin & Gemcitabine                                                                                                                                                               Chemotherapy Regimen:  Carboplatin (day 1) + Gemcitabine (days 1,8) every 21 days X 6 cycles     TOPICS EDUCATION PROVIDED EDUCATION REINFORCED COMMENTS   ANEMIA:  role of RBC, cause, s/s, ways to manage, role of transfusion [x] [] Discussed the role of red blood cells in our body as well as the signs/symptoms of anemia such as fatigue and weakness.   THROMBOCYTOPENIA:  role of platelet, cause, s/s, ways to prevent bleeding, things to avoid, when to seek help [x] [] Discussed the role of platelets and the potential of decreased platelet counts with therapy which could lead to increased risk of bleeding.   NEUTROPENIA:  role of WBC, cause, infection precautions, s/s of infection, when to call MD [x] [] Spoke about the role of WBC in preventing infection and how these are often decreased with chemotherapy. Discussed the importance of hand hygiene, avoiding large crowds and people that could possibly be sick. Instructed her to call MD if temperature reaches 100.4.   NUTRITION & APPETITE CHANGES:  importance of maintaining healthy diet & weight, ways to manage to improve intake, dietary consult, exercise regimen [x] [] Discussed the potential for decreased appetite while on therapy. Spoke about importance of eating consistently to ensure adequate nutrition. Suggested eating small meals throughout the day.   DIARRHEA:  causes, s/s of dehydration, ways to manage, dietary changes, when  to call MD [x] [] Discussed the risk of diarrhea while on therapy. Can use OTC loperamide, but call MD if 4-6 episodes in 24 hours without relief.   CONSTIPATION:  causes, ways to manage, dietary changes, when to call MD [x] [] Discussed the risk of  constipation which can be treated with over the counter laxatives. Instructedher to let us know if no relief provided by OTC measures.    NAUSEA & VOMITING:  cause, use of antiemetics, dietary changes, when to call MD [x] [] Spoke about possibility of N/V while on therapy. Informed her that a prescription for ondansetron to take as needed for nausea and dexamethasone to take daily for 3 days after infusion will be sent to pharmacy. Instructed her to take ondansetron as soon as she feels slightly nauseous.   MOUTH SORES:  causes, oral care, ways to manage [x] [] Discussed preventative measures such as salt/soda rinse, ice during infusion, use of soft bristle tooth brush, avoidance of acidic foods, and avoiding alcohol based mouth rinses.     ALOPECIA:  cause, ways to manage, resources [x] [] Discussed that hair loss is common with this treatment. Informed her that if she was interested in a wig a prescription could be written for one.   INFERTILITY & SEXUALITY:  causes, fertility preservation options, sexuality changes, ways to manage, importance of birth control [x] [] Discussed the importance of birth control while on therapy.   NERVOUS SYSTEM CHANGES:  causes, s/s, neuropathies, cognitive changes, ways to manage [x] [] Spoke about the risk of peripheral neuropathy while on treatment and the importance of alerting us if this occurs so it can be treated accordingly.   PAIN:  causes, ways to manage [x] [] Discussed the risk of having flu like symptoms such as joint aches and pains.Recommened that she take acetaminophen if she experiences this. Alert MD if no pain relief occurs with OTC therapy???   SKIN & NAIL CHANGES:  cause, s/s, ways to manage [x] [] Discussed that  nails may become dry and discolored while on treatment.    ORGAN TOXICITIES:  cause, s/s, need for diagnostic tests, labs, when to notify MD [x] [] Informed her that this treatment could cause toxicity to her liver, kidneys and ears. Instructed her to call us if she has a decrease in urine output, painful urination, yellowing of skin/eyes, dark urine or any hearing loss. Discussed that we will be monitoring her liver and kidney function, blood cell counts and electrolytes.    SURVIVORSHIP:  distress, distress assessment, secondary malignancies, early/late effects, follow-up, social issues, social support [] []    HOME CARE:  use of spill kits, storing of PO chemo, how to manage bodily fluids [x] [] Counseled on the importance of ensuring that caregivers wear gloves if helping to clean up any body fluids such as urine or vomit for at least the first 48 hours after treatment administration. Instructed patient to wash soiled linens twice with hot water.    MISCELLANEOUS:  drug interactions, administration, vesicant, et [x] [] Went over home medication list and updated it accordingly.      Referrals:  N/A    Notes: Discussed aforementioned material with patient in the clinic. All questions and concerns were addressed. Provided patient with personalized treatment calendar, written educational materials, and Nelly May's card. Instructed patient to call should additional questions arise.

## 2020-11-09 ENCOUNTER — TELEPHONE (OUTPATIENT)
Dept: ONCOLOGY | Facility: CLINIC | Age: 40
End: 2020-11-09

## 2020-11-09 DIAGNOSIS — Z17.0 MALIGNANT NEOPLASM OF UPPER-OUTER QUADRANT OF LEFT BREAST IN FEMALE, ESTROGEN RECEPTOR POSITIVE (HCC): Primary | ICD-10-CM

## 2020-11-09 DIAGNOSIS — C50.412 MALIGNANT NEOPLASM OF UPPER-OUTER QUADRANT OF LEFT BREAST IN FEMALE, ESTROGEN RECEPTOR POSITIVE (HCC): Primary | ICD-10-CM

## 2020-11-09 LAB — CREAT SERPL-MCNC: 0.86 MG/DL

## 2020-11-09 RX ORDER — SODIUM CHLORIDE 9 MG/ML
250 INJECTION, SOLUTION INTRAVENOUS ONCE
Status: CANCELLED | OUTPATIENT
Start: 2020-11-18

## 2020-11-09 RX ORDER — PALONOSETRON 0.05 MG/ML
0.25 INJECTION, SOLUTION INTRAVENOUS ONCE
Status: CANCELLED | OUTPATIENT
Start: 2020-11-11

## 2020-11-09 RX ORDER — SODIUM CHLORIDE 9 MG/ML
250 INJECTION, SOLUTION INTRAVENOUS ONCE
Status: CANCELLED | OUTPATIENT
Start: 2020-11-11

## 2020-11-09 RX ORDER — FAMOTIDINE 10 MG/ML
20 INJECTION, SOLUTION INTRAVENOUS AS NEEDED
Status: CANCELLED | OUTPATIENT
Start: 2020-11-11

## 2020-11-09 RX ORDER — DIPHENHYDRAMINE HYDROCHLORIDE 50 MG/ML
50 INJECTION INTRAMUSCULAR; INTRAVENOUS AS NEEDED
Status: CANCELLED | OUTPATIENT
Start: 2020-11-11

## 2020-11-09 NOTE — TELEPHONE ENCOUNTER
I called patient to see if she could go locally to have labs drawn so she would not have to wait for a long time in infusion room on Wednesday.  I called [facility] at [phone] and faxed lab orders to them for cbc and cmp and patient will go there this afternoon.

## 2020-11-10 ENCOUNTER — APPOINTMENT (OUTPATIENT)
Dept: PET IMAGING | Facility: HOSPITAL | Age: 40
End: 2020-11-10

## 2020-11-11 ENCOUNTER — DOCUMENTATION (OUTPATIENT)
Dept: NUTRITION | Facility: HOSPITAL | Age: 40
End: 2020-11-11

## 2020-11-11 ENCOUNTER — DOCUMENTATION (OUTPATIENT)
Dept: SOCIAL WORK | Facility: HOSPITAL | Age: 40
End: 2020-11-11

## 2020-11-11 ENCOUNTER — EDUCATION (OUTPATIENT)
Dept: ONCOLOGY | Facility: HOSPITAL | Age: 40
End: 2020-11-11

## 2020-11-11 ENCOUNTER — HOSPITAL ENCOUNTER (OUTPATIENT)
Dept: ONCOLOGY | Facility: HOSPITAL | Age: 40
Setting detail: INFUSION SERIES
Discharge: HOME | End: 2020-11-11

## 2020-11-11 VITALS
TEMPERATURE: 97.8 F | SYSTOLIC BLOOD PRESSURE: 115 MMHG | HEART RATE: 72 BPM | BODY MASS INDEX: 27.32 KG/M2 | WEIGHT: 185 LBS | RESPIRATION RATE: 16 BRPM | DIASTOLIC BLOOD PRESSURE: 64 MMHG

## 2020-11-11 DIAGNOSIS — Z17.0 MALIGNANT NEOPLASM OF UPPER-OUTER QUADRANT OF LEFT BREAST IN FEMALE, ESTROGEN RECEPTOR POSITIVE (HCC): Primary | ICD-10-CM

## 2020-11-11 DIAGNOSIS — C50.412 MALIGNANT NEOPLASM OF UPPER-OUTER QUADRANT OF LEFT BREAST IN FEMALE, ESTROGEN RECEPTOR POSITIVE (HCC): Primary | ICD-10-CM

## 2020-11-11 PROCEDURE — 25010000002 FOSAPREPITANT PER 1 MG: Performed by: NURSE PRACTITIONER

## 2020-11-11 PROCEDURE — 96367 TX/PROPH/DG ADDL SEQ IV INF: CPT

## 2020-11-11 PROCEDURE — 96417 CHEMO IV INFUS EACH ADDL SEQ: CPT

## 2020-11-11 PROCEDURE — 96375 TX/PRO/DX INJ NEW DRUG ADDON: CPT

## 2020-11-11 PROCEDURE — 96413 CHEMO IV INFUSION 1 HR: CPT

## 2020-11-11 PROCEDURE — 25010000002 DEXAMETHASONE SODIUM PHOSPHATE 100 MG/10ML SOLUTION: Performed by: NURSE PRACTITIONER

## 2020-11-11 PROCEDURE — 25010000002 GEMCITABINE 1 GM/26.3ML SOLUTION 26.3 ML VIAL: Performed by: NURSE PRACTITIONER

## 2020-11-11 PROCEDURE — 25010000002 PALONOSETRON 0.25 MG/5ML SOLUTION PREFILLED SYRINGE: Performed by: NURSE PRACTITIONER

## 2020-11-11 PROCEDURE — 25010000002 CARBOPLATIN PER 50 MG: Performed by: NURSE PRACTITIONER

## 2020-11-11 RX ORDER — PALONOSETRON 0.05 MG/ML
0.25 INJECTION, SOLUTION INTRAVENOUS ONCE
Status: COMPLETED | OUTPATIENT
Start: 2020-11-11 | End: 2020-11-11

## 2020-11-11 RX ORDER — SODIUM CHLORIDE 9 MG/ML
250 INJECTION, SOLUTION INTRAVENOUS ONCE
Status: COMPLETED | OUTPATIENT
Start: 2020-11-11 | End: 2020-11-11

## 2020-11-11 RX ADMIN — GEMCITABINE 2000 MG: 38 INJECTION, SOLUTION INTRAVENOUS at 14:03

## 2020-11-11 RX ADMIN — SODIUM CHLORIDE 150 MG: 9 INJECTION, SOLUTION INTRAVENOUS at 13:30

## 2020-11-11 RX ADMIN — SODIUM CHLORIDE 250 ML: 9 INJECTION, SOLUTION INTRAVENOUS at 13:26

## 2020-11-11 RX ADMIN — CARBOPLATIN 700 MG: 10 INJECTION, SOLUTION INTRAVENOUS at 14:40

## 2020-11-11 RX ADMIN — PALONOSETRON 0.25 MG: 0.25 INJECTION, SOLUTION INTRAVENOUS at 13:28

## 2020-11-11 RX ADMIN — DEXAMETHASONE SODIUM PHOSPHATE 12 MG: 10 INJECTION, SOLUTION INTRAMUSCULAR; INTRAVENOUS at 13:31

## 2020-11-11 NOTE — PROGRESS NOTES
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  She enjoys spending time with her friends and family and reading for stress relief.    Top concern related to cancer diagnosis:  The possibility of the treatment not working    Current/history of smoking, tobacco use, alcoholism, drug abuse:  no    Coping skills:  Relies on her family and support system.    Referrals / Patient Resource provided:  Contact information for [facility], referral to Cancer Care and ACP packet given to patient.
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
## 2020-11-12 NOTE — PLAN OF CARE
[facility] • [address]  • [address] • [place] [address] • [phone]      CHEMOTHERAPY EDUCATION SHEET    NAME:  Ina Ram      : 1980           DATE: 20    Booklets Given: Chemotherapy and You []  Eating Hints []    Sexuality/Fertility Books []     Chemotherapy/Biotherapy Education Sheets: (list all that apply)  Carboplatin and Gemcitabine                                                                                                                                                                Chemotherapy Regimen:  Carboplatin (day 1) + Gemcitabine (days 1,8) every 21 days X 6 cycles     TOPICS EDUCATION PROVIDED EDUCATION REINFORCED COMMENTS   ANEMIA:  role of RBC, cause, s/s, ways to manage, role of transfusion [] [x] Discussed the role of red blood cells in our body as well as the signs and symptoms of anemia, such as fatigue and weakness.   THROMBOCYTOPENIA:  role of platelet, cause, s/s, ways to prevent bleeding, things to avoid, when to seek help [] [x] Discussed the role of platelets in our body and the potential for decreased platelet counts with therapy which could lead to an increased risk of bleeding.   NEUTROPENIA:  role of WBC, cause, infection precautions, s/s of infection, when to call MD [] [x] Discussed the role of white blood cells and how they help to decrease infection in our body, and how these are often decreased with chemotherapy. Discussed the importance of hand hygiene, avoiding large crowds and people that could possibly be sick. Instructed her to call MD if temperature reaches 100.4.    NUTRITION & APPETITE CHANGES:  importance of maintaining healthy diet & weight, ways to manage to improve intake, dietary consult, exercise regimen [] [x] Discussed the potential for decreased appetite while on therapy. Spoke about importance of eating consistently to ensure adequate nutrition.   DIARRHEA:  causes, s/s of dehydration, ways to manage, dietary changes,  when to call MD [] [x] Discussed the possibility of diarrhea while on therapy. Instructed patient that she can use OTC loperamide, but to contact MD if she finds no relief in 24 hours.   CONSTIPATION:  causes, ways to manage, dietary changes, when to call MD [] [x] Discussed the possibility of constipation while on therapy. Instructed patient that she can use OTC miralax, but to call us if OTC products do not provide relief.   NAUSEA & VOMITING:  cause, use of antiemetics, dietary changes, when to call MD [] [x] Discussed with patient the possibility of N/V while on therapy. Patient had already picked up zofran and dexamethasone prescriptions. Informed her to take the zofran as needed for nausea and to take the dexamethasone daily for the 3 days after the infusion.     MOUTH SORES:  causes, oral care, ways to manage [] [x] Discussed preventative measures such as salt/soda rinse, ice during infusion, use of soft bristle tooth brush, avoidance of acidic foods, and avoiding alcohol based mouth rinses.     ALOPECIA:  cause, ways to manage, resources [] [x] Discussed that hair loss is a possability with this treatment.    INFERTILITY & SEXUALITY:  causes, fertility preservation options, sexuality changes, ways to manage, importance of birth control [] [x] Discussed the importance of birth control while on therapy and using safe sex practices.   NERVOUS SYSTEM CHANGES:  causes, s/s, neuropathies, cognitive changes, ways to manage [] [x] Spoke about the risk of peripheral neuropathy while on treatment and the importance of alerting us if this occurs so it can be treated accordingly   PAIN:  causes, ways to manage [] [x] Discussed the risk of having flu like symptoms such as joint aches and pains.Recommened that she take acetaminophen if she experiences this. Call MD if no pain relief occurs with OTC therapy?   SKIN & NAIL CHANGES:  cause, s/s, ways to manage [] [x] Discussed that nails may become dry and discolored while  on treatment.   ORGAN TOXICITIES:  cause, s/s, need for diagnostic tests, labs, when to notify MD [] [x] Informed her that this treatment could cause toxicity to her liver and kidneys. Instructed her to call us if she has a decrease in urine output, painful urination, dark urine. Discussed that we will be monitoring her liver and kidney function, blood cell counts and electrolytes.   SURVIVORSHIP:  distress, distress assessment, secondary malignancies, early/late effects, follow-up, social issues, social support [] []    HOME CARE:  use of spill kits, storing of PO chemo, how to manage bodily fluids [] [x] Counseled on the importance of ensuring that caregivers wear gloves if helping to clean up any body fluids such as urine or vomit for at least the first 48 hours after treatment administration. Instructed patient to wash soiled linens twice with hot water.    MISCELLANEOUS:  drug interactions, administration, vesicant, et [] [x] Discussed side effects and how to manage them at home and when to contact MD.      Referrals:    N/A    Notes:   Discussed above material with patient in the infusion center. All questions and concerns were addressed. Patient had already been provided personalized calendar, written educational materials, and Nelly May's card. Instructed patient to call should she have any additional questions.

## 2020-11-18 ENCOUNTER — HOSPITAL ENCOUNTER (OUTPATIENT)
Dept: ONCOLOGY | Facility: HOSPITAL | Age: 40
Setting detail: INFUSION SERIES
Discharge: HOME | End: 2020-11-18

## 2020-11-18 ENCOUNTER — DOCUMENTATION (OUTPATIENT)
Dept: NUTRITION | Facility: HOSPITAL | Age: 40
End: 2020-11-18

## 2020-11-18 VITALS
HEART RATE: 91 BPM | TEMPERATURE: 98.5 F | RESPIRATION RATE: 20 BRPM | DIASTOLIC BLOOD PRESSURE: 70 MMHG | BODY MASS INDEX: 26.36 KG/M2 | SYSTOLIC BLOOD PRESSURE: 131 MMHG | HEIGHT: 69 IN | WEIGHT: 178 LBS

## 2020-11-18 DIAGNOSIS — Z17.0 MALIGNANT NEOPLASM OF UPPER-OUTER QUADRANT OF LEFT BREAST IN FEMALE, ESTROGEN RECEPTOR POSITIVE (HCC): Primary | ICD-10-CM

## 2020-11-18 DIAGNOSIS — C50.412 MALIGNANT NEOPLASM OF UPPER-OUTER QUADRANT OF LEFT BREAST IN FEMALE, ESTROGEN RECEPTOR POSITIVE (HCC): Primary | ICD-10-CM

## 2020-11-18 LAB
ERYTHROCYTE [DISTWIDTH] IN BLOOD BY AUTOMATED COUNT: 12.8 % (ref 12.3–15.4)
HCT VFR BLD AUTO: 38.1 % (ref 34–46.6)
HGB BLD-MCNC: 12.8 G/DL (ref 12–15.9)
LYMPHOCYTES # BLD AUTO: 0.9 10*3/MM3 (ref 0.7–3.1)
LYMPHOCYTES NFR BLD AUTO: 22.6 % (ref 19.6–45.3)
MCH RBC QN AUTO: 30.6 PG (ref 26.6–33)
MCHC RBC AUTO-ENTMCNC: 33.6 G/DL (ref 31.5–35.7)
MCV RBC AUTO: 91 FL (ref 79–97)
MONOCYTES # BLD AUTO: 0.1 10*3/MM3 (ref 0.1–0.9)
MONOCYTES NFR BLD AUTO: 2.4 % (ref 5–12)
NEUTROPHILS NFR BLD AUTO: 3.1 10*3/MM3 (ref 1.7–7)
NEUTROPHILS NFR BLD AUTO: 75 % (ref 42.7–76)
PLATELET # BLD AUTO: 151 10*3/MM3 (ref 140–450)
PMV BLD AUTO: 7 FL (ref 6–12)
RBC # BLD AUTO: 4.19 10*6/MM3 (ref 3.77–5.28)
WBC # BLD AUTO: 4.2 10*3/MM3 (ref 3.4–10.8)

## 2020-11-18 PROCEDURE — 25010000002 DEXAMETHASONE SODIUM PHOSPHATE 100 MG/10ML SOLUTION: Performed by: NURSE PRACTITIONER

## 2020-11-18 PROCEDURE — 25010000002 GEMCITABINE 1 GM/26.3ML SOLUTION 26.3 ML VIAL: Performed by: NURSE PRACTITIONER

## 2020-11-18 PROCEDURE — 85025 COMPLETE CBC W/AUTO DIFF WBC: CPT

## 2020-11-18 PROCEDURE — 96375 TX/PRO/DX INJ NEW DRUG ADDON: CPT

## 2020-11-18 PROCEDURE — 96413 CHEMO IV INFUSION 1 HR: CPT

## 2020-11-18 RX ORDER — SODIUM CHLORIDE 9 MG/ML
250 INJECTION, SOLUTION INTRAVENOUS ONCE
Status: COMPLETED | OUTPATIENT
Start: 2020-11-18 | End: 2020-11-18

## 2020-11-18 RX ADMIN — GEMCITABINE 2000 MG: 38 INJECTION, SOLUTION INTRAVENOUS at 12:39

## 2020-11-18 RX ADMIN — DEXAMETHASONE SODIUM PHOSPHATE 12 MG: 10 INJECTION, SOLUTION INTRAMUSCULAR; INTRAVENOUS at 12:10

## 2020-11-18 RX ADMIN — SODIUM CHLORIDE 250 ML: 9 INJECTION, SOLUTION INTRAVENOUS at 12:10

## 2020-11-18 NOTE — PROGRESS NOTES
Onc Nutrition    Patient: Ina Ram  YOB: 1980    Diagnosis: ER weakly positive HER-2 negative breast cancer   Chemotherapy:  Carbo(D1) / Gemzar(D1,8) - every 21 days x 6    Weight: 178# / ~7# weight loss x 1 week - will monitor   Nutrition Symptoms: fatigue and decreased appetite    Follow up with patient during her infusion appointment.  She complains of fatigue and decreased appetite.  She states she feels like she ate and drank fairly well this past week.    Advised her to be consuming smaller more frequent snacks throughout the day with an emphasis on high protein foods.  Encouraged her to be sipping on hydrating fluids throughout the day.  Also encouraged her to partake in physical activity as tolerated to help fight fatigue and build stamina.    Answered her questions and she voiced understanding of information discussed.  Encouraged to call RD with questions.  Will monitor as needed during her treatment course.    Mari Villalta RD  11/18/20

## 2020-12-01 ENCOUNTER — TELEPHONE (OUTPATIENT)
Dept: ONCOLOGY | Facility: CLINIC | Age: 40
End: 2020-12-01

## 2020-12-01 RX ORDER — LEVOFLOXACIN 750 MG/1
750 TABLET ORAL DAILY
Qty: 7 TABLET | Refills: 0 | Status: SHIPPED | OUTPATIENT
Start: 2020-12-01 | End: 2021-08-26

## 2020-12-01 NOTE — TELEPHONE ENCOUNTER
Patient called back wants Dr. Malloy to know that she has tested positive for COVID. Her PCP will fax over her lab results.

## 2020-12-01 NOTE — TELEPHONE ENCOUNTER
Pt called because she has had a fever and some body aches and is wondering if she should be coming in for her appointments tomorrow. Pt states that she spoke w/ on call  last night. She is unsure as to whether or not she is getting sick.     Please give pt a call back at [phone].

## 2020-12-01 NOTE — PROGRESS NOTES
Patient had fever last night.  We spoke to her this morning and recommended covid19 testing.  She had this done locally and a rapid test was positive.  She also had labs that showed neutropenia, .  Her max temp was 101 last night.  She has not had fevers today but is having fatigue and myalgias.  She denies shortness of breath or cough.      Will send in Rx for levaquin given neutropenia.  She does have a pulse oximeter at home.  Recommended that she check this 2-3 times a day or for any respiratory symptoms.  If O2 sat < 94% or if worsening symptoms, low threshold to go to her local ED.  Patient stated understanding of all of the above.    Outside labs 12/1/20:  Wbc 1.5   hgb 11.3  plt 16  anc 400

## 2020-12-01 NOTE — TELEPHONE ENCOUNTER
"Patient ran temp 101.9 last night but with tylenol is normal today.  Patient states aching and \"feels bad\".  Per Dr. Malloy, patient instructed [place] and notify our office with results. Patient r/s for MyChartVisit 12/2/20 and message sent to referral pool to r/s infusion appt for next week pending Covid testing results. Patient stated understanding.  "

## 2020-12-02 ENCOUNTER — APPOINTMENT (OUTPATIENT)
Dept: ONCOLOGY | Facility: HOSPITAL | Age: 40
End: 2020-12-02

## 2020-12-03 ENCOUNTER — TELEPHONE (OUTPATIENT)
Dept: ONCOLOGY | Facility: CLINIC | Age: 40
End: 2020-12-03

## 2020-12-03 NOTE — TELEPHONE ENCOUNTER
Pt called to let us know that her PCP has admitted her to the hospital ([facility] in [place]) and they are giving her decadron and Remdesivir.     Pt can be reached at [phone] if there are any questions.

## 2020-12-03 NOTE — TELEPHONE ENCOUNTER
Called patient to check on her and she said she was feeling okay and went inpatient last evening.  She is on oxygen per nasal cannula and using dex and remdesivir.  She will notify us when she is discharged.

## 2020-12-07 NOTE — TELEPHONE ENCOUNTER
Called to check on patient.  She was released this morning from hospital.  She is tired and weak but doing ok.  Patient is aware of 12/16/20 appt with Dr. Malloy and infusion to follow.  Records requested from [facility] in [place] [phone].

## 2020-12-16 ENCOUNTER — OFFICE VISIT (OUTPATIENT)
Dept: ONCOLOGY | Facility: CLINIC | Age: 40
End: 2020-12-16

## 2020-12-16 ENCOUNTER — APPOINTMENT (OUTPATIENT)
Dept: ONCOLOGY | Facility: HOSPITAL | Age: 40
End: 2020-12-16

## 2020-12-16 VITALS
WEIGHT: 183 LBS | SYSTOLIC BLOOD PRESSURE: 133 MMHG | TEMPERATURE: 97.8 F | BODY MASS INDEX: 27.11 KG/M2 | RESPIRATION RATE: 18 BRPM | OXYGEN SATURATION: 98 % | HEART RATE: 87 BPM | HEIGHT: 69 IN | DIASTOLIC BLOOD PRESSURE: 67 MMHG

## 2020-12-16 DIAGNOSIS — Z17.0 MALIGNANT NEOPLASM OF UPPER-OUTER QUADRANT OF LEFT BREAST IN FEMALE, ESTROGEN RECEPTOR POSITIVE (HCC): Primary | ICD-10-CM

## 2020-12-16 DIAGNOSIS — C50.412 MALIGNANT NEOPLASM OF UPPER-OUTER QUADRANT OF LEFT BREAST IN FEMALE, ESTROGEN RECEPTOR POSITIVE (HCC): Primary | ICD-10-CM

## 2020-12-16 PROCEDURE — 99214 OFFICE O/P EST MOD 30 MIN: CPT | Performed by: INTERNAL MEDICINE

## 2020-12-16 RX ORDER — PALONOSETRON 0.05 MG/ML
0.25 INJECTION, SOLUTION INTRAVENOUS ONCE
Status: CANCELLED | OUTPATIENT
Start: 2021-01-06

## 2020-12-16 RX ORDER — FAMOTIDINE 10 MG/ML
20 INJECTION, SOLUTION INTRAVENOUS AS NEEDED
Status: CANCELLED | OUTPATIENT
Start: 2021-01-06

## 2020-12-16 RX ORDER — SODIUM CHLORIDE 9 MG/ML
250 INJECTION, SOLUTION INTRAVENOUS ONCE
Status: CANCELLED | OUTPATIENT
Start: 2021-01-06

## 2020-12-16 RX ORDER — DIPHENHYDRAMINE HYDROCHLORIDE 50 MG/ML
50 INJECTION INTRAMUSCULAR; INTRAVENOUS AS NEEDED
Status: CANCELLED | OUTPATIENT
Start: 2021-01-06

## 2020-12-16 RX ORDER — SODIUM CHLORIDE 9 MG/ML
250 INJECTION, SOLUTION INTRAVENOUS ONCE
Status: CANCELLED | OUTPATIENT
Start: 2021-01-13

## 2020-12-16 NOTE — PROGRESS NOTES
PROBLEM LIST:  1. [id number] ER weakly positive (1-3%), OK negative, Her2 negative invasive ductal carcinoma of the left breast  A) presented with a palpable breast mass.  Biopsy showed ER+, OK- Her2- IDC, grade 3.  B) neoadjuvant TAC started July 2018.  Complicated by neutropenic fever after cycle 4.  Dose reduced to 80% for cycles 5 and 6.  C) bilateral mastectomy on 11/27/18.  Pathology showed a 1.7 cm high grade IDC with superficial invasion into the skeletal muscle.  0/2 SLN involved.  D) PET scan on 1/14/2019 showed a single focus of hypermetabolic activity along the left lateral aspect of the breast and chest wall.  On 2/19/2019 she underwent resection of this lesion which showed residual high-grade invasive ductal carcinoma with tumor extending to the deep margin.  Further resection not possible.  Adjuvant radiotherapy completed 5/10/19.  E) 6/15/2019 she started adjuvant Xeloda.   F) PET scan on 2/19/2019 showed persistent hypermetabolic focus adjacent to the left implant.  On 1/21/2020 she underwent wide excision which showed infiltrating, poorly differentiated ductal adenocarcinoma with positive margins, specimen with extended margins negative  G) PET scan August 25 2020 showed hypermetabolic uptake in chest wall.  On 9/11/20, this was excised, with pathology showing a 2.1 cm poorly differentiated carcinoma, margins negative.  ER weakly positive, OK -, Her2-  H) adjuvant chemotherapy with carboplatin and gemzar started 11/11/20.  Admitted with Covid19 infection and neutropenic fever after cycle 1.  Cycle 2 delayed until 12/30/20.      Subjective      Chief Complaint: follow up breast cancer     HISTORY OF PRESENT ILLNESS:   Ina Ram returns for follow-up.  She was admitted to the hospital after cycle 1.  She developed fever, and was found to be neutropenic and + for COVID19.  She went into the hospital and was there for 5 days.  She required O2 supplementation for 2-3 days.  She says she still  "feels pretty tired but otherwise recovering.  O2 sats high 90s today.    Past Medical History, Past Surgical History, Social History, Family History have been reviewed and are without significant changes except as mentioned.    Review of Systems   A comprehensive 14 point review of systems was performed and was negative except as mentioned.    Medications:  The current medication list was reviewed in the EMR    ALLERGIES:  No Known Allergies    Objective      /67   Pulse 87   Temp 97.8 °F (36.6 °C) (Temporal)   Resp 18   Ht 175.3 cm (69.02\")   Wt 83 kg (183 lb)   SpO2 98%   BMI 27.01 kg/m²      Performance Status: 0    General: well appearing female in no acute distress  Neuro: alert and oriented  HEENT: sclera anicteric, oropharynx clear  Lymphatics: no cervical, supraclavicular, or axillary adenopathy  Left chest wall: incision well healed.  No erythema, very firm scar tissue but no distinct mass  Cardiovascular: regular rate and rhythm, no murmurs  Lungs: clear to auscultation bilaterally  Abdomen: soft, nontender, nondistended.  No palpable organomegaly  Extremeties: no lower extremity edema  Skin: no rashes, lesions, bruising, or petechiae  Psych: mood and affect appropriate          Assessment/Plan   Ina Ram is a 40 y.o. year old female with a stage II B ER weakly positive HER-2 negative breast cancer who returns for follow up.       We will delay resuming chemotherapy treatment until 2 more weeks to give her more time to recover from her recent infection.  We discussed that immunosuppressed patient could in theory not have the same protection from covid infection as someone with a normal immune system, so continued caution is advised.  I will plan for a total of 4 cycles.  Given her neutropenia, will reduce doses to 90% for remaining cycles. We will repeat imaging with PET scan after 4 cycles.    STRATA testing on her recent excision showed no targetable abnormalities.    Follow up prior " to cycle 3      I spent 25 minutes with the patient. I spent > 50% percent of this time counseling and discussing prognosis, diagnostic testing, evaluation, current status and management.        Fifi Malloy MD  [facility]    12/16/2020

## 2020-12-28 ENCOUNTER — TELEPHONE (OUTPATIENT)
Dept: ONCOLOGY | Facility: CLINIC | Age: 40
End: 2020-12-28

## 2020-12-28 NOTE — TELEPHONE ENCOUNTER
I called the patient back and she has been coughing a lot but no fevers.  She went to her pcp today and they did cxr and told her she had pneumonia.  She has an infusion scheduled for Wed, Dec. 30.  I sent a msg to Dr. Malloy and she said to delay patient by a week.  We had to delay due to her covid positivity on earlier in the month.  The patient has been started on levaquin daily per pcp.  I rescheduled her to Jan 6 and Jan. 13 and told patient that I would call and check on her Monday, Jan. 4.  She verbalized understanding.

## 2020-12-28 NOTE — TELEPHONE ENCOUNTER
Pt called because she is supposed to have treatment on Wednesday but has been diagnosed with pneumonia today, so she is not sure if she should continue with the treatment or not. Please call pt back to discuss at [phone]

## 2020-12-30 ENCOUNTER — APPOINTMENT (OUTPATIENT)
Dept: ONCOLOGY | Facility: HOSPITAL | Age: 40
End: 2020-12-30

## 2021-01-04 ENCOUNTER — TELEPHONE (OUTPATIENT)
Dept: ONCOLOGY | Facility: CLINIC | Age: 41
End: 2021-01-04

## 2021-01-04 NOTE — TELEPHONE ENCOUNTER
I called the patient to check on her as she had started levaquin a week ago per pcp for a pneumonia.  Patient reported to day that she took her last levaquin yesterday and she has not had any fevers in several days, over a couple of weeks.  The coughing was the reason she followed up with pcp, not fevers.  She is scheduled for infusion on Jan. 6.

## 2021-01-06 ENCOUNTER — DOCUMENTATION (OUTPATIENT)
Dept: NUTRITION | Facility: HOSPITAL | Age: 41
End: 2021-01-06

## 2021-01-06 ENCOUNTER — HOSPITAL ENCOUNTER (OUTPATIENT)
Dept: ONCOLOGY | Facility: HOSPITAL | Age: 41
Setting detail: INFUSION SERIES
Discharge: HOME | End: 2021-01-06

## 2021-01-06 VITALS
SYSTOLIC BLOOD PRESSURE: 123 MMHG | BODY MASS INDEX: 27.25 KG/M2 | HEART RATE: 85 BPM | WEIGHT: 184 LBS | DIASTOLIC BLOOD PRESSURE: 64 MMHG | RESPIRATION RATE: 20 BRPM | TEMPERATURE: 97.3 F | HEIGHT: 69 IN

## 2021-01-06 DIAGNOSIS — Z17.0 MALIGNANT NEOPLASM OF UPPER-OUTER QUADRANT OF LEFT BREAST IN FEMALE, ESTROGEN RECEPTOR POSITIVE (HCC): Primary | ICD-10-CM

## 2021-01-06 DIAGNOSIS — C50.412 MALIGNANT NEOPLASM OF UPPER-OUTER QUADRANT OF LEFT BREAST IN FEMALE, ESTROGEN RECEPTOR POSITIVE (HCC): Primary | ICD-10-CM

## 2021-01-06 LAB
ALBUMIN SERPL-MCNC: 4.6 G/DL (ref 3.5–5.2)
ALBUMIN/GLOB SERPL: 1.8 G/DL
ALP SERPL-CCNC: 70 U/L (ref 39–117)
ALT SERPL W P-5'-P-CCNC: 36 U/L (ref 1–33)
ANION GAP SERPL CALCULATED.3IONS-SCNC: 9 MMOL/L (ref 5–15)
AST SERPL-CCNC: 26 U/L (ref 1–32)
BILIRUB SERPL-MCNC: 0.3 MG/DL (ref 0–1.2)
BUN SERPL-MCNC: 7 MG/DL (ref 6–20)
BUN/CREAT SERPL: 11.9 (ref 7–25)
CALCIUM SPEC-SCNC: 9.7 MG/DL (ref 8.6–10.5)
CHLORIDE SERPL-SCNC: 100 MMOL/L (ref 98–107)
CO2 SERPL-SCNC: 26 MMOL/L (ref 22–29)
CREAT BLDA-MCNC: 0.6 MG/DL (ref 0.6–1.3)
CREAT SERPL-MCNC: 0.59 MG/DL (ref 0.57–1)
ERYTHROCYTE [DISTWIDTH] IN BLOOD BY AUTOMATED COUNT: 17.2 % (ref 12.3–15.4)
GFR SERPL CREATININE-BSD FRML MDRD: 113 ML/MIN/1.73
GLOBULIN UR ELPH-MCNC: 2.5 GM/DL
GLUCOSE SERPL-MCNC: 90 MG/DL (ref 65–99)
HCT VFR BLD AUTO: 39.8 % (ref 34–46.6)
HGB BLD-MCNC: 12.7 G/DL (ref 12–15.9)
LYMPHOCYTES # BLD AUTO: 1.8 10*3/MM3 (ref 0.7–3.1)
LYMPHOCYTES NFR BLD AUTO: 30.3 % (ref 19.6–45.3)
MCH RBC QN AUTO: 30.3 PG (ref 26.6–33)
MCHC RBC AUTO-ENTMCNC: 32 G/DL (ref 31.5–35.7)
MCV RBC AUTO: 94.7 FL (ref 79–97)
MONOCYTES # BLD AUTO: 0.2 10*3/MM3 (ref 0.1–0.9)
MONOCYTES NFR BLD AUTO: 3.3 % (ref 5–12)
NEUTROPHILS NFR BLD AUTO: 3.9 10*3/MM3 (ref 1.7–7)
NEUTROPHILS NFR BLD AUTO: 66.4 % (ref 42.7–76)
PLATELET # BLD AUTO: 200 10*3/MM3 (ref 140–450)
PMV BLD AUTO: 7.1 FL (ref 6–12)
POTASSIUM SERPL-SCNC: 3.6 MMOL/L (ref 3.5–5.2)
PROT SERPL-MCNC: 7.1 G/DL (ref 6–8.5)
RBC # BLD AUTO: 4.2 10*6/MM3 (ref 3.77–5.28)
SODIUM SERPL-SCNC: 135 MMOL/L (ref 136–145)
WBC # BLD AUTO: 5.8 10*3/MM3 (ref 3.4–10.8)

## 2021-01-06 PROCEDURE — 96365 THER/PROPH/DIAG IV INF INIT: CPT

## 2021-01-06 PROCEDURE — 96375 TX/PRO/DX INJ NEW DRUG ADDON: CPT

## 2021-01-06 PROCEDURE — 25010000002 CARBOPLATIN PER 50 MG: Performed by: INTERNAL MEDICINE

## 2021-01-06 PROCEDURE — 85025 COMPLETE CBC W/AUTO DIFF WBC: CPT | Performed by: INTERNAL MEDICINE

## 2021-01-06 PROCEDURE — 25010000002 GEMCITABINE 200 MG/5.26ML SOLUTION 5.26 ML VIAL: Performed by: INTERNAL MEDICINE

## 2021-01-06 PROCEDURE — 25010000002 FOSAPREPITANT PER 1 MG: Performed by: INTERNAL MEDICINE

## 2021-01-06 PROCEDURE — 25010000002 GEMCITABINE 1 GM/26.3ML SOLUTION 26.3 ML VIAL: Performed by: INTERNAL MEDICINE

## 2021-01-06 PROCEDURE — 80053 COMPREHEN METABOLIC PANEL: CPT | Performed by: INTERNAL MEDICINE

## 2021-01-06 PROCEDURE — 96413 CHEMO IV INFUSION 1 HR: CPT

## 2021-01-06 PROCEDURE — 25010000002 PALONOSETRON 0.25 MG/5ML SOLUTION PREFILLED SYRINGE: Performed by: INTERNAL MEDICINE

## 2021-01-06 PROCEDURE — 82565 ASSAY OF CREATININE: CPT

## 2021-01-06 PROCEDURE — 25010000002 DEXAMETHASONE SODIUM PHOSPHATE 100 MG/10ML SOLUTION: Performed by: INTERNAL MEDICINE

## 2021-01-06 PROCEDURE — 96367 TX/PROPH/DG ADDL SEQ IV INF: CPT

## 2021-01-06 PROCEDURE — 96417 CHEMO IV INFUS EACH ADDL SEQ: CPT

## 2021-01-06 RX ORDER — SODIUM CHLORIDE 9 MG/ML
250 INJECTION, SOLUTION INTRAVENOUS ONCE
Status: COMPLETED | OUTPATIENT
Start: 2021-01-06 | End: 2021-01-06

## 2021-01-06 RX ORDER — PALONOSETRON 0.05 MG/ML
0.25 INJECTION, SOLUTION INTRAVENOUS ONCE
Status: COMPLETED | OUTPATIENT
Start: 2021-01-06 | End: 2021-01-06

## 2021-01-06 RX ADMIN — SODIUM CHLORIDE 250 ML: 9 INJECTION, SOLUTION INTRAVENOUS at 14:24

## 2021-01-06 RX ADMIN — CARBOPLATIN 630 MG: 10 INJECTION, SOLUTION INTRAVENOUS at 15:36

## 2021-01-06 RX ADMIN — PALONOSETRON 0.25 MG: 0.25 INJECTION, SOLUTION INTRAVENOUS at 14:30

## 2021-01-06 RX ADMIN — SODIUM CHLORIDE 150 MG: 9 INJECTION, SOLUTION INTRAVENOUS at 14:30

## 2021-01-06 RX ADMIN — DEXAMETHASONE SODIUM PHOSPHATE 12 MG: 10 INJECTION, SOLUTION INTRAMUSCULAR; INTRAVENOUS at 14:30

## 2021-01-06 RX ADMIN — GEMCITABINE 1800 MG: 38 INJECTION, SOLUTION INTRAVENOUS at 15:01

## 2021-01-07 NOTE — PROGRESS NOTES
Onc Nutrition    Patient: Ina Ram  YOB: 1980    Diagnosis: ER weakly positive HER-2 negative breast cancer   Chemotherapy:  Carbo(D1) / Gemzar(D1,8) - every 21 days (D1C2)     Weight: 184# / 6# weight gain since last RD visit     Follow up with patient during her infusion appointment.  She reports she is feeling much better and that her taste buds and appetite has returned.  She denies significant nutritional complaints at this time.    Reviewed the importance of good nutrition during her treatment course.  Advised her to be eating smaller more frequent meals/snacks throughout the day with an emphasis on high protein foods and adequate hydration.  Encouraged her to be sipping on fluids throughout the day and to have readily available snacks on hand.    Answered her questions and she voiced understanding of information discussed.  Encouraged to call RD with questions.  Will monitor as needed during her treatment course.    Mari Villalta, MIRIAM  01/07/21

## 2021-01-08 ENCOUNTER — TELEPHONE (OUTPATIENT)
Dept: ONCOLOGY | Facility: CLINIC | Age: 41
End: 2021-01-08

## 2021-01-08 NOTE — TELEPHONE ENCOUNTER
Patient called she wants to know if she can get the COVID-19 vaccine? She had treatment on 1/6/21 and will have it again on 1/13/21 she has a chance to get the vaccine this Sunday if this is okay? Please call.

## 2021-01-08 NOTE — TELEPHONE ENCOUNTER
I spoke to Dr. Malloy and it is okay for patient to get the vaccine but she may have more of a flu like symptom since she had a positive covid test and has circulating antibodies.  I called patient and gave her the information and she verbalized understanding.

## 2021-01-11 ENCOUNTER — TELEPHONE (OUTPATIENT)
Dept: ONCOLOGY | Facility: CLINIC | Age: 41
End: 2021-01-11

## 2021-01-11 NOTE — TELEPHONE ENCOUNTER
Patient asking if she needs a port with just two more cycles of chemo left.  I told her I would check with Dr. Malloy and the reason we ordered it was due to her having multiple sticks at her last infusion appt.  Patient then asked about a PICC line and I told her I would ask Dr. Malloy and that the picc did require weekly care but I could possibly set that up locally for her at the [facility] in [place].  She said she would let me know at her infusion appt. On Wednesday.

## 2021-01-11 NOTE — TELEPHONE ENCOUNTER
Patient called she said AJ's office called about putting the port in, but she said is this needed with her just having 2 more treatments left? Please call.

## 2021-01-13 ENCOUNTER — HOSPITAL ENCOUNTER (OUTPATIENT)
Dept: ONCOLOGY | Facility: HOSPITAL | Age: 41
Setting detail: INFUSION SERIES
Discharge: HOME | End: 2021-01-13

## 2021-01-13 VITALS
BODY MASS INDEX: 27.55 KG/M2 | RESPIRATION RATE: 20 BRPM | WEIGHT: 186 LBS | TEMPERATURE: 97.8 F | HEIGHT: 69 IN | HEART RATE: 81 BPM | DIASTOLIC BLOOD PRESSURE: 70 MMHG | SYSTOLIC BLOOD PRESSURE: 125 MMHG

## 2021-01-13 DIAGNOSIS — C50.412 MALIGNANT NEOPLASM OF UPPER-OUTER QUADRANT OF LEFT BREAST IN FEMALE, ESTROGEN RECEPTOR POSITIVE (HCC): Primary | ICD-10-CM

## 2021-01-13 DIAGNOSIS — Z17.0 MALIGNANT NEOPLASM OF UPPER-OUTER QUADRANT OF LEFT BREAST IN FEMALE, ESTROGEN RECEPTOR POSITIVE (HCC): Primary | ICD-10-CM

## 2021-01-13 LAB
ALBUMIN SERPL-MCNC: 4.5 G/DL (ref 3.5–5.2)
ALBUMIN/GLOB SERPL: 1.8 G/DL
ALP SERPL-CCNC: 72 U/L (ref 39–117)
ALT SERPL W P-5'-P-CCNC: 46 U/L (ref 1–33)
ANION GAP SERPL CALCULATED.3IONS-SCNC: 12 MMOL/L (ref 5–15)
AST SERPL-CCNC: 33 U/L (ref 1–32)
BILIRUB SERPL-MCNC: 0.3 MG/DL (ref 0–1.2)
BUN SERPL-MCNC: 7 MG/DL (ref 6–20)
BUN/CREAT SERPL: 13.2 (ref 7–25)
CALCIUM SPEC-SCNC: 9.3 MG/DL (ref 8.6–10.5)
CHLORIDE SERPL-SCNC: 98 MMOL/L (ref 98–107)
CO2 SERPL-SCNC: 27 MMOL/L (ref 22–29)
CREAT SERPL-MCNC: 0.53 MG/DL (ref 0.57–1)
ERYTHROCYTE [DISTWIDTH] IN BLOOD BY AUTOMATED COUNT: 16 % (ref 12.3–15.4)
GFR SERPL CREATININE-BSD FRML MDRD: 128 ML/MIN/1.73
GLOBULIN UR ELPH-MCNC: 2.5 GM/DL
GLUCOSE SERPL-MCNC: 99 MG/DL (ref 65–99)
HCT VFR BLD AUTO: 35.4 % (ref 34–46.6)
HGB BLD-MCNC: 11.7 G/DL (ref 12–15.9)
LYMPHOCYTES # BLD AUTO: 0.9 10*3/MM3 (ref 0.7–3.1)
LYMPHOCYTES NFR BLD AUTO: 29.4 % (ref 19.6–45.3)
MCH RBC QN AUTO: 30.8 PG (ref 26.6–33)
MCHC RBC AUTO-ENTMCNC: 33.1 G/DL (ref 31.5–35.7)
MCV RBC AUTO: 93.1 FL (ref 79–97)
MONOCYTES # BLD AUTO: 0.1 10*3/MM3 (ref 0.1–0.9)
MONOCYTES NFR BLD AUTO: 2.3 % (ref 5–12)
NEUTROPHILS NFR BLD AUTO: 2.1 10*3/MM3 (ref 1.7–7)
NEUTROPHILS NFR BLD AUTO: 68.3 % (ref 42.7–76)
PLATELET # BLD AUTO: 113 10*3/MM3 (ref 140–450)
PMV BLD AUTO: 6.9 FL (ref 6–12)
POTASSIUM SERPL-SCNC: 4.5 MMOL/L (ref 3.5–5.2)
PROT SERPL-MCNC: 7 G/DL (ref 6–8.5)
RBC # BLD AUTO: 3.8 10*6/MM3 (ref 3.77–5.28)
SODIUM SERPL-SCNC: 137 MMOL/L (ref 136–145)
WBC # BLD AUTO: 3.1 10*3/MM3 (ref 3.4–10.8)

## 2021-01-13 PROCEDURE — 96413 CHEMO IV INFUSION 1 HR: CPT

## 2021-01-13 PROCEDURE — 96375 TX/PRO/DX INJ NEW DRUG ADDON: CPT

## 2021-01-13 PROCEDURE — 25010000002 GEMCITABINE 200 MG/5.26ML SOLUTION 5.26 ML VIAL: Performed by: INTERNAL MEDICINE

## 2021-01-13 PROCEDURE — 25010000002 DEXAMETHASONE SODIUM PHOSPHATE 100 MG/10ML SOLUTION: Performed by: INTERNAL MEDICINE

## 2021-01-13 PROCEDURE — 80053 COMPREHEN METABOLIC PANEL: CPT | Performed by: INTERNAL MEDICINE

## 2021-01-13 PROCEDURE — 25010000002 GEMCITABINE 1 GM/26.3ML SOLUTION 26.3 ML VIAL: Performed by: INTERNAL MEDICINE

## 2021-01-13 PROCEDURE — 85025 COMPLETE CBC W/AUTO DIFF WBC: CPT | Performed by: INTERNAL MEDICINE

## 2021-01-13 RX ORDER — SODIUM CHLORIDE 9 MG/ML
250 INJECTION, SOLUTION INTRAVENOUS ONCE
Status: COMPLETED | OUTPATIENT
Start: 2021-01-13 | End: 2021-01-13

## 2021-01-13 RX ADMIN — SODIUM CHLORIDE 250 ML: 9 INJECTION, SOLUTION INTRAVENOUS at 14:13

## 2021-01-13 RX ADMIN — DEXAMETHASONE SODIUM PHOSPHATE 12 MG: 10 INJECTION, SOLUTION INTRAMUSCULAR; INTRAVENOUS at 14:14

## 2021-01-13 RX ADMIN — GEMCITABINE HYDROCHLORIDE 1800 MG: 1 INJECTION, SOLUTION INTRAVENOUS at 14:33

## 2021-01-27 ENCOUNTER — HOSPITAL ENCOUNTER (OUTPATIENT)
Dept: ONCOLOGY | Facility: HOSPITAL | Age: 41
Setting detail: INFUSION SERIES
Discharge: HOME | End: 2021-01-27

## 2021-01-27 ENCOUNTER — OFFICE VISIT (OUTPATIENT)
Dept: ONCOLOGY | Facility: CLINIC | Age: 41
End: 2021-01-27

## 2021-01-27 VITALS
TEMPERATURE: 97.8 F | HEART RATE: 76 BPM | RESPIRATION RATE: 16 BRPM | DIASTOLIC BLOOD PRESSURE: 73 MMHG | BODY MASS INDEX: 27.4 KG/M2 | HEIGHT: 69 IN | SYSTOLIC BLOOD PRESSURE: 136 MMHG | WEIGHT: 185 LBS | OXYGEN SATURATION: 100 %

## 2021-01-27 DIAGNOSIS — C50.412 MALIGNANT NEOPLASM OF UPPER-OUTER QUADRANT OF LEFT BREAST IN FEMALE, ESTROGEN RECEPTOR POSITIVE (HCC): ICD-10-CM

## 2021-01-27 DIAGNOSIS — C50.412 MALIGNANT NEOPLASM OF UPPER-OUTER QUADRANT OF LEFT BREAST IN FEMALE, ESTROGEN RECEPTOR POSITIVE (HCC): Primary | ICD-10-CM

## 2021-01-27 DIAGNOSIS — Z17.0 MALIGNANT NEOPLASM OF UPPER-OUTER QUADRANT OF LEFT BREAST IN FEMALE, ESTROGEN RECEPTOR POSITIVE (HCC): Primary | ICD-10-CM

## 2021-01-27 DIAGNOSIS — Z17.0 MALIGNANT NEOPLASM OF UPPER-OUTER QUADRANT OF LEFT BREAST IN FEMALE, ESTROGEN RECEPTOR POSITIVE (HCC): ICD-10-CM

## 2021-01-27 LAB
CREAT BLDA-MCNC: 0.6 MG/DL (ref 0.6–1.3)
ERYTHROCYTE [DISTWIDTH] IN BLOOD BY AUTOMATED COUNT: 15.4 % (ref 12.3–15.4)
HCT VFR BLD AUTO: 32.2 % (ref 34–46.6)
HGB BLD-MCNC: 10.7 G/DL (ref 12–15.9)
LYMPHOCYTES # BLD AUTO: 2.2 10*3/MM3 (ref 0.7–3.1)
LYMPHOCYTES NFR BLD AUTO: 70.2 % (ref 19.6–45.3)
MCH RBC QN AUTO: 31.2 PG (ref 26.6–33)
MCHC RBC AUTO-ENTMCNC: 33.3 G/DL (ref 31.5–35.7)
MCV RBC AUTO: 93.6 FL (ref 79–97)
MONOCYTES # BLD AUTO: 0.2 10*3/MM3 (ref 0.1–0.9)
MONOCYTES NFR BLD AUTO: 6.9 % (ref 5–12)
NEUTROPHILS NFR BLD AUTO: 0.7 10*3/MM3 (ref 1.7–7)
NEUTROPHILS NFR BLD AUTO: 22.9 % (ref 42.7–76)
PLATELET # BLD AUTO: 112 10*3/MM3 (ref 140–450)
PMV BLD AUTO: 8.2 FL (ref 6–12)
RBC # BLD AUTO: 3.44 10*6/MM3 (ref 3.77–5.28)
WBC # BLD AUTO: 3.1 10*3/MM3 (ref 3.4–10.8)

## 2021-01-27 PROCEDURE — 36415 COLL VENOUS BLD VENIPUNCTURE: CPT

## 2021-01-27 PROCEDURE — 85025 COMPLETE CBC W/AUTO DIFF WBC: CPT | Performed by: NURSE PRACTITIONER

## 2021-01-27 PROCEDURE — 82565 ASSAY OF CREATININE: CPT

## 2021-01-27 PROCEDURE — 99214 OFFICE O/P EST MOD 30 MIN: CPT | Performed by: NURSE PRACTITIONER

## 2021-01-27 RX ORDER — ERGOCALCIFEROL 1.25 MG/1
CAPSULE ORAL
COMMUNITY
Start: 2021-01-22 | End: 2022-06-30

## 2021-01-27 RX ORDER — SODIUM CHLORIDE 9 MG/ML
250 INJECTION, SOLUTION INTRAVENOUS ONCE
Status: CANCELLED | OUTPATIENT
Start: 2021-02-10

## 2021-01-27 RX ORDER — DIPHENHYDRAMINE HYDROCHLORIDE 50 MG/ML
50 INJECTION INTRAMUSCULAR; INTRAVENOUS AS NEEDED
Status: CANCELLED | OUTPATIENT
Start: 2021-02-10

## 2021-01-27 RX ORDER — FAMOTIDINE 10 MG/ML
20 INJECTION, SOLUTION INTRAVENOUS AS NEEDED
Status: CANCELLED | OUTPATIENT
Start: 2021-02-10

## 2021-01-27 RX ORDER — SODIUM CHLORIDE 9 MG/ML
250 INJECTION, SOLUTION INTRAVENOUS ONCE
Status: CANCELLED | OUTPATIENT
Start: 2021-02-17

## 2021-01-27 RX ORDER — PALONOSETRON 0.05 MG/ML
0.25 INJECTION, SOLUTION INTRAVENOUS ONCE
Status: CANCELLED | OUTPATIENT
Start: 2021-02-10

## 2021-01-27 NOTE — PROGRESS NOTES
PROBLEM LIST:  1. [id number] ER weakly positive (1-3%), NC negative, Her2 negative invasive ductal carcinoma of the left breast  A) presented with a palpable breast mass.  Biopsy showed ER+, NC- Her2- IDC, grade 3.  B) neoadjuvant TAC started July 2018.  Complicated by neutropenic fever after cycle 4.  Dose reduced to 80% for cycles 5 and 6.  C) bilateral mastectomy on 11/27/18.  Pathology showed a 1.7 cm high grade IDC with superficial invasion into the skeletal muscle.  0/2 SLN involved.  D) PET scan on 1/14/2019 showed a single focus of hypermetabolic activity along the left lateral aspect of the breast and chest wall.  On 2/19/2019 she underwent resection of this lesion which showed residual high-grade invasive ductal carcinoma with tumor extending to the deep margin.  Further resection not possible.  Adjuvant radiotherapy completed 5/10/19.  E) 6/15/2019 she started adjuvant Xeloda.   F) PET scan on 2/19/2019 showed persistent hypermetabolic focus adjacent to the left implant.  On 1/21/2020 she underwent wide excision which showed infiltrating, poorly differentiated ductal adenocarcinoma with positive margins, specimen with extended margins negative  G) PET scan August 25 2020 showed hypermetabolic uptake in chest wall.  On 9/11/20, this was excised, with pathology showing a 2.1 cm poorly differentiated carcinoma, margins negative.  ER weakly positive, NC -, Her2-  H) adjuvant chemotherapy with carboplatin and gemzar started 11/11/20.  Admitted with Covid19 infection and neutropenic fever after cycle 1.  Cycle 2 delayed until 12/30/20.      Subjective      Chief Complaint: follow up breast cancer     HISTORY OF PRESENT ILLNESS:   Ina Ram returns for follow-up.  She has completed 2 cycles of carboplatin and Gemzar.  She tolerated cycle 2 relatively well.  She does have moderate fatigue and dysgeusia.  She has a decrease in appetite but denies any nausea or vomiting.  No diarrhea or mouth sores.  No  "fevers chills cough or shortness of air.      Past Medical History, Past Surgical History, Social History, Family History have been reviewed and are without significant changes except as mentioned.    Review of Systems   A comprehensive 14 point review of systems was performed and was negative except as mentioned.    Medications:  The current medication list was reviewed in the EMR    ALLERGIES:  No Known Allergies    Objective      /73   Pulse 76   Temp 97.8 °F (36.6 °C)   Resp 16   Ht 175.3 cm (69\")   Wt 83.9 kg (185 lb)   SpO2 100%   BMI 27.32 kg/m²    Vitals:    01/27/21 1027   PainSc: 0-No pain             Performance Status: 0    General: well appearing female in no acute distress  Neuro: alert and oriented  HEENT: sclera anicteric, oropharynx clear  Lymphatics: no cervical, supraclavicular, or axillary adenopathy  Left chest wall: incision well healed.  No erythema, very firm scar tissue but no distinct mass  Cardiovascular: regular rate and rhythm, no murmurs  Lungs: clear to auscultation bilaterally  Abdomen: soft, nontender, nondistended.  No palpable organomegaly  Extremeties: no lower extremity edema  Skin: no rashes, lesions, bruising, or petechiae  Psych: mood and affect appropriate          Assessment/Plan   Ina Ram is a 40 y.o. year old female with a stage II B ER weakly positive HER-2 negative breast cancer who returns for follow up.       She tolerated cycle 2 of Gemzar and carboplatin relatively well.  We will proceed with cycle 3 with continued dose reduction to 90%.  We will repeat imaging with PET scan after 4 cycles. She is due for her second COVID vaccination 01/31/2021.     STRATA testing on her recent excision showed no targetable abnormalities.    Follow up prior to cycle 3        I spent 30 minutes caring for Ina on this date of service. This time includes time spent by me in the following activities: preparing for the visit, reviewing tests, obtaining and/or " reviewing a separately obtained history, performing a medically appropriate examination and/or evaluation, counseling and educating the patient/family/caregiver and documenting information in the medical record.         Kendra Weiner, SHIVA  [facility]    1/27/2021

## 2021-02-03 ENCOUNTER — HOSPITAL ENCOUNTER (OUTPATIENT)
Dept: ONCOLOGY | Facility: HOSPITAL | Age: 41
Setting detail: INFUSION SERIES
Discharge: HOME | End: 2021-02-03

## 2021-02-03 VITALS
WEIGHT: 186 LBS | HEART RATE: 81 BPM | BODY MASS INDEX: 27.55 KG/M2 | TEMPERATURE: 97.6 F | HEIGHT: 69 IN | RESPIRATION RATE: 16 BRPM | SYSTOLIC BLOOD PRESSURE: 142 MMHG | DIASTOLIC BLOOD PRESSURE: 66 MMHG

## 2021-02-03 DIAGNOSIS — Z17.0 MALIGNANT NEOPLASM OF UPPER-OUTER QUADRANT OF LEFT BREAST IN FEMALE, ESTROGEN RECEPTOR POSITIVE (HCC): Primary | ICD-10-CM

## 2021-02-03 DIAGNOSIS — C50.412 MALIGNANT NEOPLASM OF UPPER-OUTER QUADRANT OF LEFT BREAST IN FEMALE, ESTROGEN RECEPTOR POSITIVE (HCC): Primary | ICD-10-CM

## 2021-02-03 DIAGNOSIS — Z17.0 MALIGNANT NEOPLASM OF UPPER-OUTER QUADRANT OF LEFT BREAST IN FEMALE, ESTROGEN RECEPTOR POSITIVE (HCC): ICD-10-CM

## 2021-02-03 DIAGNOSIS — C50.412 MALIGNANT NEOPLASM OF UPPER-OUTER QUADRANT OF LEFT BREAST IN FEMALE, ESTROGEN RECEPTOR POSITIVE (HCC): ICD-10-CM

## 2021-02-03 LAB
ALBUMIN SERPL-MCNC: 4.5 G/DL (ref 3.5–5.2)
ALBUMIN/GLOB SERPL: 2.1 G/DL
ALP SERPL-CCNC: 67 U/L (ref 39–117)
ALT SERPL W P-5'-P-CCNC: 39 U/L (ref 1–33)
ANION GAP SERPL CALCULATED.3IONS-SCNC: 10 MMOL/L (ref 5–15)
AST SERPL-CCNC: 28 U/L (ref 1–32)
BILIRUB SERPL-MCNC: 0.3 MG/DL (ref 0–1.2)
BUN SERPL-MCNC: 6 MG/DL (ref 6–20)
BUN/CREAT SERPL: 9.2 (ref 7–25)
CALCIUM SPEC-SCNC: 9 MG/DL (ref 8.6–10.5)
CHLORIDE SERPL-SCNC: 102 MMOL/L (ref 98–107)
CO2 SERPL-SCNC: 25 MMOL/L (ref 22–29)
CREAT BLDA-MCNC: 0.6 MG/DL (ref 0.6–1.3)
CREAT SERPL-MCNC: 0.65 MG/DL (ref 0.57–1)
ERYTHROCYTE [DISTWIDTH] IN BLOOD BY AUTOMATED COUNT: 19.7 % (ref 12.3–15.4)
GFR SERPL CREATININE-BSD FRML MDRD: 101 ML/MIN/1.73
GLOBULIN UR ELPH-MCNC: 2.1 GM/DL
GLUCOSE SERPL-MCNC: 102 MG/DL (ref 65–99)
HCT VFR BLD AUTO: 34.1 % (ref 34–46.6)
HGB BLD-MCNC: 11.1 G/DL (ref 12–15.9)
LYMPHOCYTES # BLD AUTO: 1.5 10*3/MM3 (ref 0.7–3.1)
LYMPHOCYTES NFR BLD AUTO: 55.4 % (ref 19.6–45.3)
MCH RBC QN AUTO: 31.3 PG (ref 26.6–33)
MCHC RBC AUTO-ENTMCNC: 32.5 G/DL (ref 31.5–35.7)
MCV RBC AUTO: 96.3 FL (ref 79–97)
MONOCYTES # BLD AUTO: 0.3 10*3/MM3 (ref 0.1–0.9)
MONOCYTES NFR BLD AUTO: 11.8 % (ref 5–12)
NEUTROPHILS NFR BLD AUTO: 0.9 10*3/MM3 (ref 1.7–7)
NEUTROPHILS NFR BLD AUTO: 32.8 % (ref 42.7–76)
PLATELET # BLD AUTO: 250 10*3/MM3 (ref 140–450)
PMV BLD AUTO: 6.5 FL (ref 6–12)
POTASSIUM SERPL-SCNC: 4.3 MMOL/L (ref 3.5–5.2)
PROT SERPL-MCNC: 6.6 G/DL (ref 6–8.5)
RBC # BLD AUTO: 3.54 10*6/MM3 (ref 3.77–5.28)
SODIUM SERPL-SCNC: 137 MMOL/L (ref 136–145)
WBC # BLD AUTO: 2.7 10*3/MM3 (ref 3.4–10.8)

## 2021-02-03 PROCEDURE — 80053 COMPREHEN METABOLIC PANEL: CPT | Performed by: INTERNAL MEDICINE

## 2021-02-03 PROCEDURE — 82565 ASSAY OF CREATININE: CPT

## 2021-02-03 PROCEDURE — 85025 COMPLETE CBC W/AUTO DIFF WBC: CPT | Performed by: NURSE PRACTITIONER

## 2021-02-04 ENCOUNTER — DOCUMENTATION (OUTPATIENT)
Dept: ONCOLOGY | Facility: CLINIC | Age: 41
End: 2021-02-04

## 2021-02-04 NOTE — PROGRESS NOTES
I am recommending addition of neulasta to her treatment due to neutropenic fever that developed after a previous cycle, as well as hospital admission for COVID19 infection which occurred during an episode of neutropenia.

## 2021-02-08 ENCOUNTER — TELEPHONE (OUTPATIENT)
Dept: ONCOLOGY | Facility: CLINIC | Age: 41
End: 2021-02-08

## 2021-02-08 NOTE — TELEPHONE ENCOUNTER
Patient calling triage line to report that she has a lump on her wrist that is kind of hard and tender to touch.  She had an IV stick in that wrist last week but did not get chemo as her anc was 0.9.  I talked with SHIVA Traore and advised patient that this was probably phlebitis in her vein from the stick and she can use warm compresses to the area and take tylenol as an antiinflammatory.  I called the patient back and told her to use warm compresses 4-5 times today and take tylenol.  I did tell her to monitor her temperature and let us know if a fever develops as her infection fighting count was down last week.  Patient verbalized understanding.

## 2021-02-10 ENCOUNTER — HOSPITAL ENCOUNTER (OUTPATIENT)
Dept: ONCOLOGY | Facility: HOSPITAL | Age: 41
Setting detail: INFUSION SERIES
Discharge: HOME | End: 2021-02-10

## 2021-02-10 VITALS
SYSTOLIC BLOOD PRESSURE: 142 MMHG | TEMPERATURE: 97.2 F | WEIGHT: 187 LBS | HEART RATE: 99 BPM | HEIGHT: 69 IN | DIASTOLIC BLOOD PRESSURE: 65 MMHG | RESPIRATION RATE: 18 BRPM | BODY MASS INDEX: 27.7 KG/M2

## 2021-02-10 DIAGNOSIS — Z17.0 MALIGNANT NEOPLASM OF UPPER-OUTER QUADRANT OF LEFT BREAST IN FEMALE, ESTROGEN RECEPTOR POSITIVE (HCC): Primary | ICD-10-CM

## 2021-02-10 DIAGNOSIS — C50.412 MALIGNANT NEOPLASM OF UPPER-OUTER QUADRANT OF LEFT BREAST IN FEMALE, ESTROGEN RECEPTOR POSITIVE (HCC): Primary | ICD-10-CM

## 2021-02-10 LAB
ALBUMIN SERPL-MCNC: 4.6 G/DL (ref 3.5–5.2)
ALBUMIN/GLOB SERPL: 2.1 G/DL
ALP SERPL-CCNC: 65 U/L (ref 39–117)
ALT SERPL W P-5'-P-CCNC: 27 U/L (ref 1–33)
ANION GAP SERPL CALCULATED.3IONS-SCNC: 8 MMOL/L (ref 5–15)
AST SERPL-CCNC: 23 U/L (ref 1–32)
BILIRUB SERPL-MCNC: 0.3 MG/DL (ref 0–1.2)
BUN SERPL-MCNC: 4 MG/DL (ref 6–20)
BUN/CREAT SERPL: 6.3 (ref 7–25)
CALCIUM SPEC-SCNC: 9.5 MG/DL (ref 8.6–10.5)
CHLORIDE SERPL-SCNC: 106 MMOL/L (ref 98–107)
CO2 SERPL-SCNC: 26 MMOL/L (ref 22–29)
CREAT SERPL-MCNC: 0.6 MG/DL
CREAT SERPL-MCNC: 0.63 MG/DL (ref 0.57–1)
ERYTHROCYTE [DISTWIDTH] IN BLOOD BY AUTOMATED COUNT: 19.7 % (ref 12.3–15.4)
GFR SERPL CREATININE-BSD FRML MDRD: 105 ML/MIN/1.73
GLOBULIN UR ELPH-MCNC: 2.2 GM/DL
GLUCOSE SERPL-MCNC: 94 MG/DL (ref 65–99)
HCT VFR BLD AUTO: 30.9 % (ref 34–46.6)
HGB BLD-MCNC: 10.3 G/DL (ref 12–15.9)
LYMPHOCYTES # BLD AUTO: 1.2 10*3/MM3 (ref 0.7–3.1)
LYMPHOCYTES NFR BLD AUTO: 23.3 % (ref 19.6–45.3)
MCH RBC QN AUTO: 31.1 PG (ref 26.6–33)
MCHC RBC AUTO-ENTMCNC: 33.4 G/DL (ref 31.5–35.7)
MCV RBC AUTO: 92.9 FL (ref 79–97)
MONOCYTES # BLD AUTO: 0.1 10*3/MM3 (ref 0.1–0.9)
MONOCYTES NFR BLD AUTO: 2.1 % (ref 5–12)
NEUTROPHILS NFR BLD AUTO: 4 10*3/MM3 (ref 1.7–7)
NEUTROPHILS NFR BLD AUTO: 74.6 % (ref 42.7–76)
PLATELET # BLD AUTO: 315 10*3/MM3 (ref 140–450)
PMV BLD AUTO: 5.8 FL (ref 6–12)
POTASSIUM SERPL-SCNC: 4.4 MMOL/L (ref 3.5–5.2)
PROT SERPL-MCNC: 6.8 G/DL (ref 6–8.5)
RBC # BLD AUTO: 3.32 10*6/MM3 (ref 3.77–5.28)
SODIUM SERPL-SCNC: 140 MMOL/L (ref 136–145)
WBC # BLD AUTO: 5.3 10*3/MM3 (ref 3.4–10.8)

## 2021-02-10 PROCEDURE — 82565 ASSAY OF CREATININE: CPT

## 2021-02-10 PROCEDURE — 25010000002 DEXAMETHASONE SODIUM PHOSPHATE 100 MG/10ML SOLUTION: Performed by: NURSE PRACTITIONER

## 2021-02-10 PROCEDURE — 96367 TX/PROPH/DG ADDL SEQ IV INF: CPT

## 2021-02-10 PROCEDURE — 25010000002 FOSAPREPITANT PER 1 MG: Performed by: NURSE PRACTITIONER

## 2021-02-10 PROCEDURE — 96375 TX/PRO/DX INJ NEW DRUG ADDON: CPT

## 2021-02-10 PROCEDURE — 96366 THER/PROPH/DIAG IV INF ADDON: CPT

## 2021-02-10 PROCEDURE — 96413 CHEMO IV INFUSION 1 HR: CPT

## 2021-02-10 PROCEDURE — 85025 COMPLETE CBC W/AUTO DIFF WBC: CPT | Performed by: NURSE PRACTITIONER

## 2021-02-10 PROCEDURE — 25010000002 PALONOSETRON 0.25 MG/5ML SOLUTION PREFILLED SYRINGE: Performed by: NURSE PRACTITIONER

## 2021-02-10 PROCEDURE — 80053 COMPREHEN METABOLIC PANEL: CPT | Performed by: NURSE PRACTITIONER

## 2021-02-10 PROCEDURE — 25010000002 GEMCITABINE 200 MG/5.26ML SOLUTION 5.26 ML VIAL: Performed by: NURSE PRACTITIONER

## 2021-02-10 PROCEDURE — 25010000002 CARBOPLATIN PER 50 MG: Performed by: NURSE PRACTITIONER

## 2021-02-10 PROCEDURE — 96417 CHEMO IV INFUS EACH ADDL SEQ: CPT

## 2021-02-10 PROCEDURE — 25010000002 GEMCITABINE 1 GM/26.3ML SOLUTION 26.3 ML VIAL: Performed by: NURSE PRACTITIONER

## 2021-02-10 RX ORDER — SODIUM CHLORIDE 9 MG/ML
250 INJECTION, SOLUTION INTRAVENOUS ONCE
Status: COMPLETED | OUTPATIENT
Start: 2021-02-10 | End: 2021-02-10

## 2021-02-10 RX ORDER — PALONOSETRON 0.05 MG/ML
0.25 INJECTION, SOLUTION INTRAVENOUS ONCE
Status: COMPLETED | OUTPATIENT
Start: 2021-02-10 | End: 2021-02-10

## 2021-02-10 RX ADMIN — GEMCITABINE 1800 MG: 38 INJECTION, SOLUTION INTRAVENOUS at 14:00

## 2021-02-10 RX ADMIN — CARBOPLATIN 630 MG: 10 INJECTION, SOLUTION INTRAVENOUS at 14:58

## 2021-02-10 RX ADMIN — DEXAMETHASONE SODIUM PHOSPHATE 12 MG: 10 INJECTION, SOLUTION INTRAMUSCULAR; INTRAVENOUS at 13:31

## 2021-02-10 RX ADMIN — SODIUM CHLORIDE 150 MG: 9 INJECTION, SOLUTION INTRAVENOUS at 13:32

## 2021-02-10 RX ADMIN — PALONOSETRON 0.25 MG: 0.25 INJECTION, SOLUTION INTRAVENOUS at 13:29

## 2021-02-10 RX ADMIN — SODIUM CHLORIDE 250 ML: 9 INJECTION, SOLUTION INTRAVENOUS at 13:30

## 2021-02-11 LAB — CREAT BLDA-MCNC: 0.6 MG/DL (ref 0.6–1.3)

## 2021-02-17 ENCOUNTER — APPOINTMENT (OUTPATIENT)
Dept: INFUSION THERAPY | Facility: HOSPITAL | Age: 41
End: 2021-02-17

## 2021-02-17 ENCOUNTER — OFFICE VISIT (OUTPATIENT)
Dept: ONCOLOGY | Facility: CLINIC | Age: 41
End: 2021-02-17

## 2021-02-17 ENCOUNTER — HOSPITAL ENCOUNTER (OUTPATIENT)
Dept: CARDIOLOGY | Facility: HOSPITAL | Age: 41
Discharge: HOME | End: 2021-02-17
Admitting: INTERNAL MEDICINE

## 2021-02-17 ENCOUNTER — HOSPITAL ENCOUNTER (OUTPATIENT)
Dept: ONCOLOGY | Facility: HOSPITAL | Age: 41
Setting detail: INFUSION SERIES
Discharge: HOME | End: 2021-02-17

## 2021-02-17 VITALS
HEIGHT: 69 IN | RESPIRATION RATE: 16 BRPM | TEMPERATURE: 97.5 F | BODY MASS INDEX: 27.4 KG/M2 | DIASTOLIC BLOOD PRESSURE: 72 MMHG | WEIGHT: 185 LBS | SYSTOLIC BLOOD PRESSURE: 131 MMHG | HEART RATE: 84 BPM | OXYGEN SATURATION: 100 %

## 2021-02-17 VITALS — WEIGHT: 185 LBS | BODY MASS INDEX: 27.4 KG/M2 | HEIGHT: 69 IN

## 2021-02-17 DIAGNOSIS — C50.412 MALIGNANT NEOPLASM OF UPPER-OUTER QUADRANT OF LEFT BREAST IN FEMALE, ESTROGEN RECEPTOR POSITIVE (HCC): Primary | ICD-10-CM

## 2021-02-17 DIAGNOSIS — Z17.0 MALIGNANT NEOPLASM OF UPPER-OUTER QUADRANT OF LEFT BREAST IN FEMALE, ESTROGEN RECEPTOR POSITIVE (HCC): Primary | ICD-10-CM

## 2021-02-17 DIAGNOSIS — Z17.0 MALIGNANT NEOPLASM OF UPPER-OUTER QUADRANT OF LEFT BREAST IN FEMALE, ESTROGEN RECEPTOR POSITIVE (HCC): ICD-10-CM

## 2021-02-17 DIAGNOSIS — C50.412 MALIGNANT NEOPLASM OF UPPER-OUTER QUADRANT OF LEFT BREAST IN FEMALE, ESTROGEN RECEPTOR POSITIVE (HCC): ICD-10-CM

## 2021-02-17 DIAGNOSIS — I80.8 SUPERFICIAL THROMBOPHLEBITIS OF RIGHT UPPER EXTREMITY: Primary | ICD-10-CM

## 2021-02-17 LAB
BH CV UPPER VENOUS LEFT SUBCLAVIAN AUGMENT: NORMAL
BH CV UPPER VENOUS LEFT SUBCLAVIAN COMPRESS: NORMAL
BH CV UPPER VENOUS LEFT SUBCLAVIAN PHASIC: NORMAL
BH CV UPPER VENOUS LEFT SUBCLAVIAN SPONT: NORMAL
BH CV UPPER VENOUS RIGHT AXILLARY AUGMENT: NORMAL
BH CV UPPER VENOUS RIGHT AXILLARY COMPETENT: NORMAL
BH CV UPPER VENOUS RIGHT AXILLARY COMPRESS: NORMAL
BH CV UPPER VENOUS RIGHT AXILLARY PHASIC: NORMAL
BH CV UPPER VENOUS RIGHT AXILLARY SPONT: NORMAL
BH CV UPPER VENOUS RIGHT BASILIC FOREARM COLOR: 1
BH CV UPPER VENOUS RIGHT BASILIC FOREARM COMPRESS: NORMAL
BH CV UPPER VENOUS RIGHT BASILIC UPPER COLOR: 1
BH CV UPPER VENOUS RIGHT BASILIC UPPER COMPRESS: NORMAL
BH CV UPPER VENOUS RIGHT BRACHIAL COMPRESS: NORMAL
BH CV UPPER VENOUS RIGHT CEPHALIC UPPER COMPRESS: NORMAL
BH CV UPPER VENOUS RIGHT INTERNAL JUGULAR AUGMENT: NORMAL
BH CV UPPER VENOUS RIGHT INTERNAL JUGULAR COMPRESS: NORMAL
BH CV UPPER VENOUS RIGHT INTERNAL JUGULAR PHASIC: NORMAL
BH CV UPPER VENOUS RIGHT INTERNAL JUGULAR SPONT: NORMAL
BH CV UPPER VENOUS RIGHT RADIAL COMPRESS: NORMAL
BH CV UPPER VENOUS RIGHT SUBCLAVIAN AUGMENT: NORMAL
BH CV UPPER VENOUS RIGHT SUBCLAVIAN COMPRESS: NORMAL
BH CV UPPER VENOUS RIGHT SUBCLAVIAN PHASIC: NORMAL
BH CV UPPER VENOUS RIGHT SUBCLAVIAN SPONT: NORMAL
BH CV UPPER VENOUS RIGHT ULNAR COMPRESS: NORMAL
ERYTHROCYTE [DISTWIDTH] IN BLOOD BY AUTOMATED COUNT: 17.7 % (ref 12.3–15.4)
HCT VFR BLD AUTO: 30 % (ref 34–46.6)
HGB BLD-MCNC: 10.1 G/DL (ref 12–15.9)
LYMPHOCYTES # BLD AUTO: 0.8 10*3/MM3 (ref 0.7–3.1)
LYMPHOCYTES NFR BLD AUTO: 26.3 % (ref 19.6–45.3)
MCH RBC QN AUTO: 31.8 PG (ref 26.6–33)
MCHC RBC AUTO-ENTMCNC: 33.6 G/DL (ref 31.5–35.7)
MCV RBC AUTO: 94.6 FL (ref 79–97)
MONOCYTES # BLD AUTO: 0.1 10*3/MM3 (ref 0.1–0.9)
MONOCYTES NFR BLD AUTO: 3.2 % (ref 5–12)
NEUTROPHILS NFR BLD AUTO: 2.3 10*3/MM3 (ref 1.7–7)
NEUTROPHILS NFR BLD AUTO: 70.5 % (ref 42.7–76)
PLATELET # BLD AUTO: 143 10*3/MM3 (ref 140–450)
PMV BLD AUTO: 6.8 FL (ref 6–12)
RBC # BLD AUTO: 3.18 10*6/MM3 (ref 3.77–5.28)
WBC # BLD AUTO: 3.2 10*3/MM3 (ref 3.4–10.8)

## 2021-02-17 PROCEDURE — 25010000002 DEXAMETHASONE SODIUM PHOSPHATE 100 MG/10ML SOLUTION: Performed by: NURSE PRACTITIONER

## 2021-02-17 PROCEDURE — 96413 CHEMO IV INFUSION 1 HR: CPT

## 2021-02-17 PROCEDURE — 25010000002 GEMCITABINE 1 GM/26.3ML SOLUTION 26.3 ML VIAL: Performed by: NURSE PRACTITIONER

## 2021-02-17 PROCEDURE — 93971 EXTREMITY STUDY: CPT | Performed by: INTERNAL MEDICINE

## 2021-02-17 PROCEDURE — 85025 COMPLETE CBC W/AUTO DIFF WBC: CPT | Performed by: INTERNAL MEDICINE

## 2021-02-17 PROCEDURE — 99215 OFFICE O/P EST HI 40 MIN: CPT | Performed by: INTERNAL MEDICINE

## 2021-02-17 PROCEDURE — 25010000002 GEMCITABINE 200 MG/5.26ML SOLUTION 5.26 ML VIAL: Performed by: NURSE PRACTITIONER

## 2021-02-17 PROCEDURE — 93971 EXTREMITY STUDY: CPT

## 2021-02-17 PROCEDURE — 96375 TX/PRO/DX INJ NEW DRUG ADDON: CPT

## 2021-02-17 PROCEDURE — 96377 APPLICATON ON-BODY INJECTOR: CPT

## 2021-02-17 PROCEDURE — 25010000002 PEGFILGRASTIM 6 MG/0.6ML PREFILLED SYRINGE KIT: Performed by: INTERNAL MEDICINE

## 2021-02-17 RX ORDER — SODIUM CHLORIDE 9 MG/ML
250 INJECTION, SOLUTION INTRAVENOUS ONCE
Status: CANCELLED | OUTPATIENT
Start: 2021-03-10

## 2021-02-17 RX ORDER — SODIUM CHLORIDE 9 MG/ML
250 INJECTION, SOLUTION INTRAVENOUS ONCE
Status: CANCELLED | OUTPATIENT
Start: 2021-03-03

## 2021-02-17 RX ORDER — DIPHENHYDRAMINE HYDROCHLORIDE 50 MG/ML
50 INJECTION INTRAMUSCULAR; INTRAVENOUS AS NEEDED
Status: CANCELLED | OUTPATIENT
Start: 2021-03-03

## 2021-02-17 RX ORDER — PALONOSETRON 0.05 MG/ML
0.25 INJECTION, SOLUTION INTRAVENOUS ONCE
Status: CANCELLED | OUTPATIENT
Start: 2021-03-03

## 2021-02-17 RX ORDER — FAMOTIDINE 10 MG/ML
20 INJECTION, SOLUTION INTRAVENOUS AS NEEDED
Status: CANCELLED | OUTPATIENT
Start: 2021-03-03

## 2021-02-17 RX ORDER — SODIUM CHLORIDE 9 MG/ML
250 INJECTION, SOLUTION INTRAVENOUS ONCE
Status: COMPLETED | OUTPATIENT
Start: 2021-02-17 | End: 2021-02-17

## 2021-02-17 RX ADMIN — DEXAMETHASONE SODIUM PHOSPHATE 12 MG: 10 INJECTION, SOLUTION INTRAMUSCULAR; INTRAVENOUS at 12:02

## 2021-02-17 RX ADMIN — SODIUM CHLORIDE 250 ML: 9 INJECTION, SOLUTION INTRAVENOUS at 12:02

## 2021-02-17 RX ADMIN — PEGFILGRASTIM 6 MG: KIT SUBCUTANEOUS at 13:14

## 2021-02-17 RX ADMIN — GEMCITABINE HYDROCHLORIDE 1800 MG: 1 INJECTION, SOLUTION INTRAVENOUS at 12:36

## 2021-02-17 NOTE — PROGRESS NOTES
PROBLEM LIST:  1. [id number] ER weakly positive (1-3%), MN negative, Her2 negative invasive ductal carcinoma of the left breast  A) presented with a palpable breast mass.  Biopsy showed ER+, MN- Her2- IDC, grade 3.  B) neoadjuvant TAC started July 2018.  Complicated by neutropenic fever after cycle 4.  Dose reduced to 80% for cycles 5 and 6.  C) bilateral mastectomy on 11/27/18.  Pathology showed a 1.7 cm high grade IDC with superficial invasion into the skeletal muscle.  0/2 SLN involved.  D) PET scan on 1/14/2019 showed a single focus of hypermetabolic activity along the left lateral aspect of the breast and chest wall.  On 2/19/2019 she underwent resection of this lesion which showed residual high-grade invasive ductal carcinoma with tumor extending to the deep margin.  Further resection not possible.  Adjuvant radiotherapy completed 5/10/19.  E) 6/15/2019 she started adjuvant Xeloda.   F) PET scan on 2/19/2019 showed persistent hypermetabolic focus adjacent to the left implant.  On 1/21/2020 she underwent wide excision which showed infiltrating, poorly differentiated ductal adenocarcinoma with positive margins, specimen with extended margins negative  G) PET scan August 25 2020 showed hypermetabolic uptake in chest wall.  On 9/11/20, this was excised, with pathology showing a 2.1 cm poorly differentiated carcinoma, margins negative.  ER weakly positive, MN -, Her2-  H) adjuvant chemotherapy with carboplatin and gemzar started 11/11/20.  Admitted with Covid19 infection and neutropenic fever after cycle 1.  Cycle 2 delayed until 12/30/20.      Subjective      Chief Complaint: follow up breast cancer     HISTORY OF PRESENT ILLNESS:   Ina Ram returns for follow-up.  She says she is feeling tired but otherwise doing okay.  She is here for cycle 3-day 8 of carboplatin and Gemzar.  She had some delays in her treatment because of neutropenia.        Objective      /72   Pulse 84   Temp 97.5 °F (36.4  "°C)   Resp 16   Ht 175.3 cm (69\")   Wt 83.9 kg (185 lb)   SpO2 100%   BMI 27.32 kg/m²    Vitals:    02/17/21 1009   PainSc: 0-No pain             Performance Status: 0    General: well appearing female in no acute distress  Neuro: alert and oriented  HEENT: sclera anicteric, oropharynx clear  Lymphatics: no cervical, supraclavicular, or axillary adenopathy  Left chest wall: incision well healed.  No erythema, very firm scar tissue but no distinct mass  Cardiovascular: regular rate and rhythm, no murmurs  Lungs: clear to auscultation bilaterally  Abdomen: soft, nontender, nondistended.  No palpable organomegaly  Extremeties: no lower extremity edema  Skin: no rashes, lesions, bruising, or petechiae  Psych: mood and affect appropriate          Assessment/Plan   Ina Ram is a 40 y.o. year old female with a stage II B ER weakly positive HER-2 negative breast cancer who returns for follow up.       We will proceed with cycle 3-day 8 of Gemzar today.  She will receive Neulasta after today's dose due to her recurrent neutropenia.  We will repeat imaging with PET scan after 4 cycles. She is due for her second COVID vaccination 01/31/2021.     STRATA testing on her recent excision showed no targetable abnormalities.    Follow-up in March after PET scan imaging.      This visit addresses a chronic illness that poses a threat to life on drug therapy requiring intensive monitoring for toxicity and warrants a level 5 MDM.          Fifi Malloy MD  [facility] Hematology and Oncology    2/17/2021             "

## 2021-02-17 NOTE — PROGRESS NOTES
Call from vascular lab that patient has superficial basilic vein thrombus, no deep vein clot.    Recommended aspirin 325 mg twice daily, warm compresses, call if any increase in swelling/redness/pain in arm.   Will repeat duplex when she returns for next treatment.

## 2021-03-03 ENCOUNTER — HOSPITAL ENCOUNTER (OUTPATIENT)
Dept: CARDIOLOGY | Facility: HOSPITAL | Age: 41
Discharge: HOME | End: 2021-03-03
Admitting: INTERNAL MEDICINE

## 2021-03-03 ENCOUNTER — HOSPITAL ENCOUNTER (OUTPATIENT)
Dept: ONCOLOGY | Facility: HOSPITAL | Age: 41
Setting detail: INFUSION SERIES
Discharge: HOME | End: 2021-03-03

## 2021-03-03 ENCOUNTER — DOCUMENTATION (OUTPATIENT)
Dept: NUTRITION | Facility: HOSPITAL | Age: 41
End: 2021-03-03

## 2021-03-03 VITALS
TEMPERATURE: 97.7 F | HEART RATE: 90 BPM | DIASTOLIC BLOOD PRESSURE: 75 MMHG | WEIGHT: 187 LBS | BODY MASS INDEX: 27.62 KG/M2 | RESPIRATION RATE: 18 BRPM | SYSTOLIC BLOOD PRESSURE: 122 MMHG

## 2021-03-03 VITALS — WEIGHT: 185 LBS | HEIGHT: 69 IN | BODY MASS INDEX: 27.4 KG/M2

## 2021-03-03 DIAGNOSIS — I80.8 SUPERFICIAL THROMBOPHLEBITIS OF RIGHT UPPER EXTREMITY: ICD-10-CM

## 2021-03-03 DIAGNOSIS — C50.412 MALIGNANT NEOPLASM OF UPPER-OUTER QUADRANT OF LEFT BREAST IN FEMALE, ESTROGEN RECEPTOR POSITIVE (HCC): Primary | ICD-10-CM

## 2021-03-03 DIAGNOSIS — Z17.0 MALIGNANT NEOPLASM OF UPPER-OUTER QUADRANT OF LEFT BREAST IN FEMALE, ESTROGEN RECEPTOR POSITIVE (HCC): Primary | ICD-10-CM

## 2021-03-03 LAB
ALBUMIN SERPL-MCNC: 4.8 G/DL (ref 3.5–5.2)
ALBUMIN/GLOB SERPL: 2.3 G/DL
ALP SERPL-CCNC: 92 U/L (ref 39–117)
ALT SERPL W P-5'-P-CCNC: 56 U/L (ref 1–33)
ANION GAP SERPL CALCULATED.3IONS-SCNC: 11 MMOL/L (ref 5–15)
AST SERPL-CCNC: 31 U/L (ref 1–32)
BH CV UPPER VENOUS LEFT SUBCLAVIAN AUGMENT: NORMAL
BH CV UPPER VENOUS LEFT SUBCLAVIAN COMPRESS: NORMAL
BH CV UPPER VENOUS LEFT SUBCLAVIAN PHASIC: NORMAL
BH CV UPPER VENOUS LEFT SUBCLAVIAN SPONT: NORMAL
BH CV UPPER VENOUS RIGHT AXILLARY AUGMENT: NORMAL
BH CV UPPER VENOUS RIGHT AXILLARY COMPRESS: NORMAL
BH CV UPPER VENOUS RIGHT AXILLARY PHASIC: NORMAL
BH CV UPPER VENOUS RIGHT AXILLARY SPONT: NORMAL
BH CV UPPER VENOUS RIGHT BASILIC FOREARM COLOR: 1
BH CV UPPER VENOUS RIGHT BASILIC FOREARM COMPRESS: NORMAL
BH CV UPPER VENOUS RIGHT BASILIC UPPER COMPRESS: NORMAL
BH CV UPPER VENOUS RIGHT BRACHIAL COMPRESS: NORMAL
BH CV UPPER VENOUS RIGHT CEPHALIC FOREARM COMPRESS: NORMAL
BH CV UPPER VENOUS RIGHT CEPHALIC UPPER COMPRESS: NORMAL
BH CV UPPER VENOUS RIGHT INTERNAL JUGULAR AUGMENT: NORMAL
BH CV UPPER VENOUS RIGHT INTERNAL JUGULAR COMPRESS: NORMAL
BH CV UPPER VENOUS RIGHT INTERNAL JUGULAR PHASIC: NORMAL
BH CV UPPER VENOUS RIGHT INTERNAL JUGULAR SPONT: NORMAL
BH CV UPPER VENOUS RIGHT RADIAL COMPRESS: NORMAL
BH CV UPPER VENOUS RIGHT SUBCLAVIAN AUGMENT: NORMAL
BH CV UPPER VENOUS RIGHT SUBCLAVIAN COMPRESS: NORMAL
BH CV UPPER VENOUS RIGHT SUBCLAVIAN PHASIC: NORMAL
BH CV UPPER VENOUS RIGHT SUBCLAVIAN SPONT: NORMAL
BH CV UPPER VENOUS RIGHT ULNAR COMPRESS: NORMAL
BILIRUB SERPL-MCNC: 0.2 MG/DL (ref 0–1.2)
BUN SERPL-MCNC: 6 MG/DL (ref 6–20)
BUN/CREAT SERPL: 9.7 (ref 7–25)
CALCIUM SPEC-SCNC: 9.3 MG/DL (ref 8.6–10.5)
CHLORIDE SERPL-SCNC: 104 MMOL/L (ref 98–107)
CO2 SERPL-SCNC: 26 MMOL/L (ref 22–29)
CREAT BLDA-MCNC: 0.6 MG/DL (ref 0.6–1.3)
CREAT SERPL-MCNC: 0.62 MG/DL (ref 0.57–1)
ERYTHROCYTE [DISTWIDTH] IN BLOOD BY AUTOMATED COUNT: 17.4 % (ref 12.3–15.4)
GFR SERPL CREATININE-BSD FRML MDRD: 107 ML/MIN/1.73
GLOBULIN UR ELPH-MCNC: 2.1 GM/DL
GLUCOSE SERPL-MCNC: 94 MG/DL (ref 65–99)
HCT VFR BLD AUTO: 28.8 % (ref 34–46.6)
HGB BLD-MCNC: 9.5 G/DL (ref 12–15.9)
LYMPHOCYTES # BLD AUTO: 1.8 10*3/MM3 (ref 0.7–3.1)
LYMPHOCYTES NFR BLD AUTO: 25.1 % (ref 19.6–45.3)
MCH RBC QN AUTO: 31.4 PG (ref 26.6–33)
MCHC RBC AUTO-ENTMCNC: 32.9 G/DL (ref 31.5–35.7)
MCV RBC AUTO: 95.4 FL (ref 79–97)
MONOCYTES # BLD AUTO: 0.7 10*3/MM3 (ref 0.1–0.9)
MONOCYTES NFR BLD AUTO: 9.7 % (ref 5–12)
NEUTROPHILS NFR BLD AUTO: 4.6 10*3/MM3 (ref 1.7–7)
NEUTROPHILS NFR BLD AUTO: 65.2 % (ref 42.7–76)
PLATELET # BLD AUTO: 176 10*3/MM3 (ref 140–450)
PMV BLD AUTO: 7.3 FL (ref 6–12)
POTASSIUM SERPL-SCNC: 4.3 MMOL/L (ref 3.5–5.2)
PROT SERPL-MCNC: 6.9 G/DL (ref 6–8.5)
RBC # BLD AUTO: 3.01 10*6/MM3 (ref 3.77–5.28)
SODIUM SERPL-SCNC: 141 MMOL/L (ref 136–145)
WBC # BLD AUTO: 7.1 10*3/MM3 (ref 3.4–10.8)

## 2021-03-03 PROCEDURE — 82565 ASSAY OF CREATININE: CPT

## 2021-03-03 PROCEDURE — 85025 COMPLETE CBC W/AUTO DIFF WBC: CPT | Performed by: INTERNAL MEDICINE

## 2021-03-03 PROCEDURE — 25010000002 CARBOPLATIN PER 50 MG: Performed by: INTERNAL MEDICINE

## 2021-03-03 PROCEDURE — 96367 TX/PROPH/DG ADDL SEQ IV INF: CPT

## 2021-03-03 PROCEDURE — 25010000002 GEMCITABINE 1 GM/26.3ML SOLUTION 26.3 ML VIAL: Performed by: INTERNAL MEDICINE

## 2021-03-03 PROCEDURE — 25010000002 GEMCITABINE 200 MG/5.26ML SOLUTION 5.26 ML VIAL: Performed by: INTERNAL MEDICINE

## 2021-03-03 PROCEDURE — 25010000002 FOSAPREPITANT PER 1 MG: Performed by: INTERNAL MEDICINE

## 2021-03-03 PROCEDURE — 25010000002 PALONOSETRON 0.25 MG/5ML SOLUTION PREFILLED SYRINGE: Performed by: INTERNAL MEDICINE

## 2021-03-03 PROCEDURE — 93971 EXTREMITY STUDY: CPT | Performed by: INTERNAL MEDICINE

## 2021-03-03 PROCEDURE — 96366 THER/PROPH/DIAG IV INF ADDON: CPT

## 2021-03-03 PROCEDURE — 25010000002 DEXAMETHASONE SODIUM PHOSPHATE 100 MG/10ML SOLUTION: Performed by: INTERNAL MEDICINE

## 2021-03-03 PROCEDURE — 80053 COMPREHEN METABOLIC PANEL: CPT | Performed by: INTERNAL MEDICINE

## 2021-03-03 PROCEDURE — 96417 CHEMO IV INFUS EACH ADDL SEQ: CPT

## 2021-03-03 PROCEDURE — 93971 EXTREMITY STUDY: CPT

## 2021-03-03 PROCEDURE — 96375 TX/PRO/DX INJ NEW DRUG ADDON: CPT

## 2021-03-03 PROCEDURE — 96413 CHEMO IV INFUSION 1 HR: CPT

## 2021-03-03 PROCEDURE — 96415 CHEMO IV INFUSION ADDL HR: CPT

## 2021-03-03 RX ORDER — PALONOSETRON 0.05 MG/ML
0.25 INJECTION, SOLUTION INTRAVENOUS ONCE
Status: COMPLETED | OUTPATIENT
Start: 2021-03-03 | End: 2021-03-03

## 2021-03-03 RX ORDER — SODIUM CHLORIDE 9 MG/ML
250 INJECTION, SOLUTION INTRAVENOUS ONCE
Status: COMPLETED | OUTPATIENT
Start: 2021-03-03 | End: 2021-03-03

## 2021-03-03 RX ADMIN — GEMCITABINE 1800 MG: 38 INJECTION, SOLUTION INTRAVENOUS at 14:05

## 2021-03-03 RX ADMIN — CARBOPLATIN 630 MG: 10 INJECTION, SOLUTION INTRAVENOUS at 14:43

## 2021-03-03 RX ADMIN — SODIUM CHLORIDE 150 MG: 9 INJECTION, SOLUTION INTRAVENOUS at 13:33

## 2021-03-03 RX ADMIN — SODIUM CHLORIDE 250 ML: 9 INJECTION, SOLUTION INTRAVENOUS at 13:32

## 2021-03-03 RX ADMIN — PALONOSETRON 0.25 MG: 0.25 INJECTION, SOLUTION INTRAVENOUS at 13:32

## 2021-03-03 RX ADMIN — DEXAMETHASONE SODIUM PHOSPHATE 12 MG: 10 INJECTION, SOLUTION INTRAMUSCULAR; INTRAVENOUS at 13:32

## 2021-03-03 NOTE — PROGRESS NOTES
Onc Nutrition    Patient: Ina Ram  YOB: 1980    Diagnosis: ER weakly positive HER-2 negative breast cancer   Chemotherapy:  Carbo(D1) / Gemzar(D1,8) - every 21 days (D1C4)     Weight: 187#     Follow up with patient during her infusion appointment.  She states her appetite has been pretty good and reports she has regained the weight she lost when she had covid/hospitalized.  She does complain of some taste changes and a tender/sore mouth.    Encouraged her to continue with her good oral intake of smaller more frequent snacks throughout the day.  Advised her to use the baking soda / salt mouth rinse before she eats to aid with taste changes and after she eats to aid with oral care.      Answered her questions and she voiced understanding of information discussed.  Encouraged to call RD with questions.  Will follow up as indicated.    Mari Villalta, MIRIAM  03/03/21

## 2021-03-10 ENCOUNTER — HOSPITAL ENCOUNTER (OUTPATIENT)
Dept: ONCOLOGY | Facility: HOSPITAL | Age: 41
Setting detail: INFUSION SERIES
Discharge: HOME | End: 2021-03-10

## 2021-03-10 VITALS
HEIGHT: 69 IN | RESPIRATION RATE: 16 BRPM | TEMPERATURE: 98 F | SYSTOLIC BLOOD PRESSURE: 129 MMHG | WEIGHT: 188 LBS | HEART RATE: 93 BPM | DIASTOLIC BLOOD PRESSURE: 66 MMHG | BODY MASS INDEX: 27.85 KG/M2

## 2021-03-10 DIAGNOSIS — Z17.0 MALIGNANT NEOPLASM OF UPPER-OUTER QUADRANT OF LEFT BREAST IN FEMALE, ESTROGEN RECEPTOR POSITIVE (HCC): Primary | ICD-10-CM

## 2021-03-10 DIAGNOSIS — C50.412 MALIGNANT NEOPLASM OF UPPER-OUTER QUADRANT OF LEFT BREAST IN FEMALE, ESTROGEN RECEPTOR POSITIVE (HCC): Primary | ICD-10-CM

## 2021-03-10 LAB
ERYTHROCYTE [DISTWIDTH] IN BLOOD BY AUTOMATED COUNT: 17.7 % (ref 12.3–15.4)
HCT VFR BLD AUTO: 26 % (ref 34–46.6)
HGB BLD-MCNC: 8.7 G/DL (ref 12–15.9)
LYMPHOCYTES # BLD AUTO: 1.2 10*3/MM3 (ref 0.7–3.1)
LYMPHOCYTES NFR BLD AUTO: 27.7 % (ref 19.6–45.3)
MCH RBC QN AUTO: 31.5 PG (ref 26.6–33)
MCHC RBC AUTO-ENTMCNC: 33.6 G/DL (ref 31.5–35.7)
MCV RBC AUTO: 93.9 FL (ref 79–97)
MONOCYTES # BLD AUTO: 0.1 10*3/MM3 (ref 0.1–0.9)
MONOCYTES NFR BLD AUTO: 2.1 % (ref 5–12)
NEUTROPHILS NFR BLD AUTO: 3 10*3/MM3 (ref 1.7–7)
NEUTROPHILS NFR BLD AUTO: 70.2 % (ref 42.7–76)
PLATELET # BLD AUTO: 142 10*3/MM3 (ref 140–450)
PMV BLD AUTO: 6.6 FL (ref 6–12)
RBC # BLD AUTO: 2.77 10*6/MM3 (ref 3.77–5.28)
WBC # BLD AUTO: 4.3 10*3/MM3 (ref 3.4–10.8)

## 2021-03-10 PROCEDURE — 25010000002 DEXAMETHASONE SODIUM PHOSPHATE 100 MG/10ML SOLUTION: Performed by: INTERNAL MEDICINE

## 2021-03-10 PROCEDURE — 96375 TX/PRO/DX INJ NEW DRUG ADDON: CPT

## 2021-03-10 PROCEDURE — 25010000002 GEMCITABINE 200 MG/5.26ML SOLUTION 5.26 ML VIAL: Performed by: INTERNAL MEDICINE

## 2021-03-10 PROCEDURE — 25010000002 PEGFILGRASTIM 6 MG/0.6ML PREFILLED SYRINGE KIT: Performed by: INTERNAL MEDICINE

## 2021-03-10 PROCEDURE — 25010000002 GEMCITABINE 1 GM/26.3ML SOLUTION 26.3 ML VIAL: Performed by: INTERNAL MEDICINE

## 2021-03-10 PROCEDURE — 96377 APPLICATON ON-BODY INJECTOR: CPT

## 2021-03-10 PROCEDURE — 85025 COMPLETE CBC W/AUTO DIFF WBC: CPT | Performed by: INTERNAL MEDICINE

## 2021-03-10 PROCEDURE — 96413 CHEMO IV INFUSION 1 HR: CPT

## 2021-03-10 RX ORDER — SODIUM CHLORIDE 9 MG/ML
250 INJECTION, SOLUTION INTRAVENOUS ONCE
Status: COMPLETED | OUTPATIENT
Start: 2021-03-10 | End: 2021-03-10

## 2021-03-10 RX ADMIN — SODIUM CHLORIDE 250 ML: 9 INJECTION, SOLUTION INTRAVENOUS at 12:53

## 2021-03-10 RX ADMIN — GEMCITABINE 1800 MG: 38 INJECTION, SOLUTION INTRAVENOUS at 13:11

## 2021-03-10 RX ADMIN — PEGFILGRASTIM 6 MG: KIT SUBCUTANEOUS at 13:37

## 2021-03-10 RX ADMIN — DEXAMETHASONE SODIUM PHOSPHATE 12 MG: 10 INJECTION, SOLUTION INTRAMUSCULAR; INTRAVENOUS at 12:53

## 2021-03-16 ENCOUNTER — TELEPHONE (OUTPATIENT)
Dept: ONCOLOGY | Facility: CLINIC | Age: 41
End: 2021-03-16

## 2021-03-16 NOTE — TELEPHONE ENCOUNTER
Patient called triage line stating that she has a new place on left wrist that resembles her right arm that was suspious. She states last time she was treated she had a duplex that showed a thrombus in her vein. Stating she wonders if it's moving.    Returned call to patient discussing with her location. Patient stating left wrist and a 2-3 inch area close to elbow. Asking patient if the other area that was duplex looked any better patient reports that it's gone. Patient asking if the thrombus could move and go to left arm. Informing patient nurse will discuss with Samantha SHANNON and give her a call back.    Discussed with Samantha SHANNON and called patient back. Informing patient that she could watch it and christian the area to see if it gets any bigger. Informing her that if sites get bigger with swelling or new area's come up. To call. Informing her that if she is concerned we can bring her in to see Samantha SHANNON tomorrow and let her look at it. Patient stating she wanted to watch and see how it does and let us know if she has any issues with it. Informing patient to please cause with any concerns.

## 2021-03-18 ENCOUNTER — TELEPHONE (OUTPATIENT)
Dept: ONCOLOGY | Facility: CLINIC | Age: 41
End: 2021-03-18

## 2021-03-18 NOTE — TELEPHONE ENCOUNTER
Patient called she is short of breath, heart rate is high she just doesn't feel well. Please call to discuss.

## 2021-03-18 NOTE — TELEPHONE ENCOUNTER
Called and spoke with patient.  She advised that her shortness of breath and fast pulse started yesterday so she went to her PCP and they saw her and ordered a ct of her chest.  She advised they wanted it with contrast but were unable to get an iv so they did it without contrast.  She advised they called her and told her it was normal.  She reports she is eating and drinking well, bowels are moving fine, no n/v.  Discussed with merrick Traore who advised since she finished treatment on 3/10 and is eating/drinking well it doesn't sound like this is treatment related and that she should call her pcp back to discuss and let them know that she is not feeling better and what they would advise.  I let patient know this and advised her if she gets to feeling worse to go to ER to be evaluated as well.

## 2021-03-19 ENCOUNTER — TELEPHONE (OUTPATIENT)
Dept: ONCOLOGY | Facility: CLINIC | Age: 41
End: 2021-03-19

## 2021-03-19 NOTE — TELEPHONE ENCOUNTER
Discussed symptoms with patient. Patient states she has some pain in her arm but overall doing well. Patient had US done at [facility] [place], requested report. Advised patient this will be forwarded to Dr. Malloy for further advise if needed. Patient verbalized understanding.

## 2021-03-19 NOTE — TELEPHONE ENCOUNTER
Patient called she has a DVT in her left arm she wants to make sure Dr. Malloy received the report, also they put her on Eliquis is this okay? Also with this can she still precede with the PRT scan? Please call to discuss.

## 2021-03-23 ENCOUNTER — TELEPHONE (OUTPATIENT)
Dept: ONCOLOGY | Facility: CLINIC | Age: 41
End: 2021-03-23

## 2021-03-23 NOTE — TELEPHONE ENCOUNTER
I called and told pt that Dr Malloy was aware of the ultrasound from [place] and that she can go ahead with the PET as scheduled.  She is also to continue her eliquis as ordered.  I had to leave voice mail as patient did not answer.

## 2021-03-25 ENCOUNTER — HOSPITAL ENCOUNTER (OUTPATIENT)
Dept: PET IMAGING | Facility: HOSPITAL | Age: 41
Discharge: HOME | End: 2021-03-25

## 2021-03-25 ENCOUNTER — OFFICE VISIT (OUTPATIENT)
Dept: ONCOLOGY | Facility: CLINIC | Age: 41
End: 2021-03-25

## 2021-03-25 VITALS
WEIGHT: 189 LBS | BODY MASS INDEX: 27.99 KG/M2 | TEMPERATURE: 98.4 F | RESPIRATION RATE: 16 BRPM | OXYGEN SATURATION: 99 % | HEIGHT: 69 IN | SYSTOLIC BLOOD PRESSURE: 131 MMHG | HEART RATE: 89 BPM | DIASTOLIC BLOOD PRESSURE: 65 MMHG

## 2021-03-25 DIAGNOSIS — Z17.0 MALIGNANT NEOPLASM OF UPPER-OUTER QUADRANT OF LEFT BREAST IN FEMALE, ESTROGEN RECEPTOR POSITIVE (HCC): ICD-10-CM

## 2021-03-25 DIAGNOSIS — C50.412 MALIGNANT NEOPLASM OF UPPER-OUTER QUADRANT OF LEFT BREAST IN FEMALE, ESTROGEN RECEPTOR POSITIVE (HCC): ICD-10-CM

## 2021-03-25 DIAGNOSIS — C50.412 MALIGNANT NEOPLASM OF UPPER-OUTER QUADRANT OF LEFT BREAST IN FEMALE, ESTROGEN RECEPTOR POSITIVE (HCC): Primary | ICD-10-CM

## 2021-03-25 DIAGNOSIS — Z17.0 MALIGNANT NEOPLASM OF UPPER-OUTER QUADRANT OF LEFT BREAST IN FEMALE, ESTROGEN RECEPTOR POSITIVE (HCC): Primary | ICD-10-CM

## 2021-03-25 LAB — GLUCOSE BLDC GLUCOMTR-MCNC: 95 MG/DL (ref 70–130)

## 2021-03-25 PROCEDURE — 0 FLUDEOXYGLUCOSE F18 SOLUTION: Performed by: INTERNAL MEDICINE

## 2021-03-25 PROCEDURE — 78815 PET IMAGE W/CT SKULL-THIGH: CPT

## 2021-03-25 PROCEDURE — 99215 OFFICE O/P EST HI 40 MIN: CPT | Performed by: INTERNAL MEDICINE

## 2021-03-25 PROCEDURE — 82962 GLUCOSE BLOOD TEST: CPT

## 2021-03-25 PROCEDURE — A9552 F18 FDG: HCPCS | Performed by: INTERNAL MEDICINE

## 2021-03-25 RX ORDER — APIXABAN 5 MG/1
5 TABLET, FILM COATED ORAL DAILY
COMMUNITY
Start: 2021-03-19 | End: 2021-08-26

## 2021-03-25 RX ORDER — ASPIRIN 81 MG/1
81 TABLET ORAL 2 TIMES DAILY
COMMUNITY
End: 2021-08-26

## 2021-03-25 RX ADMIN — FLUDEOXYGLUCOSE F18 1 DOSE: 300 INJECTION INTRAVENOUS at 09:46

## 2021-03-25 NOTE — PROGRESS NOTES
PROBLEM LIST:  1. [id number] ER weakly positive (1-3%), MA negative, Her2 negative invasive ductal carcinoma of the left breast  A) presented with a palpable breast mass.  Biopsy showed ER+, MA- Her2- IDC, grade 3.  B) neoadjuvant TAC started July 2018.  Complicated by neutropenic fever after cycle 4.  Dose reduced to 80% for cycles 5 and 6.  C) bilateral mastectomy on 11/27/18.  Pathology showed a 1.7 cm high grade IDC with superficial invasion into the skeletal muscle.  0/2 SLN involved.  D) PET scan on 1/14/2019 showed a single focus of hypermetabolic activity along the left lateral aspect of the breast and chest wall.  On 2/19/2019 she underwent resection of this lesion which showed residual high-grade invasive ductal carcinoma with tumor extending to the deep margin.  Further resection not possible.  Adjuvant radiotherapy completed 5/10/19.  E) 6/15/2019 she started adjuvant Xeloda.   F) PET scan on 2/19/2019 showed persistent hypermetabolic focus adjacent to the left implant.  On 1/21/2020 she underwent wide excision which showed infiltrating, poorly differentiated ductal adenocarcinoma with positive margins, specimen with extended margins negative  G) PET scan August 25 2020 showed hypermetabolic uptake in chest wall.  On 9/11/20, this was excised, with pathology showing a 2.1 cm poorly differentiated carcinoma, margins negative.  ER weakly positive, MA -, Her2-  H) adjuvant chemotherapy with carboplatin and gemzar started 11/11/20.  Admitted with Covid19 infection and neutropenic fever after cycle 1.  Cycle 2 delayed until 12/30/20.  2. Left upper extremity DVT, diagnosed March 2021.  On eliquis.      Subjective      Chief Complaint: follow up breast cancer     HISTORY OF PRESENT ILLNESS:   Ina Ram returns for follow-up.  She was found to have a left upper extremity DVT last week at her local providers office.  She has been started on Eliquis.  She says the thrombophlebitis in the arms feels about  "the same and swelling is unchanged.  She reports that she also became extremely short of breath.  She had labs showing a hemoglobin of 7.1.  She received 2 units of blood in her shortness of breath resolved almost immediately.  She is otherwise feeling okay right now.  She had a PET scan done this morning.      Objective      /65   Pulse 89   Temp 98.4 °F (36.9 °C) (Temporal)   Resp 16   Ht 175.3 cm (69.02\")   Wt 85.7 kg (189 lb)   SpO2 99%   BMI 27.90 kg/m²    Vitals:    03/25/21 1421   PainSc: 0-No pain             Performance Status: 0    General: well appearing female in no acute distress  Neuro: alert and oriented  HEENT: sclera anicteric, oropharynx clear  Lymphatics: no cervical, supraclavicular, or axillary adenopathy  Left chest wall: incision well healed.  No erythema, very firm scar tissue but no distinct mass  Cardiovascular: regular rate and rhythm, no murmurs  Lungs: clear to auscultation bilaterally  Abdomen: soft, nontender, nondistended.  No palpable organomegaly  Extremeties: no lower extremity edema  Skin: no rashes, lesions, bruising, or petechiae  Psych: mood and affect appropriate    NM Pet Skull Base To Mid Thigh  Narrative: EXAMINATION: NM PET SKULL BASE TO MID THIGH- 03/25/2021     INDICATION: breast cancer; C50.412-Malignant neoplasm of upper-outer  quadrant of left female breast; Z17.0-Estrogen receptor positive status  (ER+)     TECHNIQUE: With fasting blood glucose level of 95 mg/dL, a total of 12.9  mCi of FDG was administered via right wrist vein. Following appropriate  delay, PET and CT images were obtained from the level of the skull  vertex to the mid thighs and fused multiplanar images reconstructed. The  CT scan is an unenhanced low-dose study for reference to the PET scan  only and does not constitute a standard diagnostic CT scan.     The radiation dose reduction device was turned on for each scan per the  ALARA (As Low as Reasonably Achievable) protocol.    "   COMPARISON: 08/11/2020 whole body PET/CT scan.     FINDINGS: Patient history indicates history of invasive ductal carcinoma  left breast in 2018. Subsequent 2019 and 2020 left side excisions with  carcinoma. Follow-up.      The most recent PET scan report of 08/11/2020 indicated change in  appearance of the left chest wall uptake with small central focus of  increased activity, questionable for low level inflammation.     Today's study shows some residual patchy changes in the left lower  chest. There is now diffuse, uniform uptake in the axial skeleton and  proximal long bones, consistent with marrow stimulation effect. No new  or progressive abnormality is seen elsewhere on the 3-D images.     Multiplanar images show no significant asymmetry of uptake in the brain.  No hypermetabolic node or mass is seen in the neck. No hypermetabolic  mediastinal, hilar, or axillary disease is appreciated. A tiny focus of  increased uptake in the lower left chest on the 3-D images corresponds  to a small scarlike opacity in the lingula, maximal SUV of only 2.6.  This has an elongated appearance on chest CT scan, and was previously a  thin linear area on prior study. Postinflammatory scarring is favored.  Similar non hypermetabolic changes are present in the anterior lingula  elsewhere. No discrete hypermetabolic focus is appreciated elsewhere.  Diffuse low level changes in the left chest wall musculature are typical  for posttreatment change and are non masslike. There is some low level  vascular activity that appears to correspond to the basilic vein, and  may represent low level inflammation here. There appears to mild  surrounding fat stranding.     Below the diaphragm, no unusually focal activity is seen in the liver.  There is the usual heterogeneous background liver activity. Adrenal  glands are non hypermetabolic. No hypermetabolic solid organ disease or  upper abdominal adenopathy is seen. There is expected GI and   tract  uptake.     Impression: 1. Probable post treatment changes with low-level uptake in the left  chest wall as noted.  2. Small lingular elongated nodule/scar is mildly hypermetabolic and  increased from the prior study, but favored to represent posttreatment  change. This area should be followed for stability.  3. Marrow stimulation effect noted.  4. No other findings particularly suspicious for metastatic disease.  5. Increased uptake in the mid left basilic vein in the upper arm, and  appearance of surrounding fat stranding on CT scan. Is there clinical  evidence to suggest a phlebitis?     D:  03/25/2021  E:  03/25/2021       I personally reviewed the imaging studies.      Assessment/Plan   Ina Ram is a 40 y.o. year old female with a stage II B ER weakly positive HER-2 negative breast cancer who returns for follow up.       She has completed 4 cycles of carboplatin and Gemzar after her most recent tumor resection.  PET scan shows no obvious disease at this time.  There are some areas with mild uptake which we will continue to monitor.  She remains at risk for recurrent disease.  I would plan to repeat PET scan imaging in about 3 months for monitoring.    Left upper extremity DVT: Continue Eliquis for a minimum of 3 months.    STRATA testing on her recent excision showed no targetable abnormalities.      Total time of patient care on day of service including time prior to, face to face with patient, and following visit spent in reviewing records, lab results, imaging studies, discussion with patient, and documentation/charting was > 40 minutes.          Fifi Malloy MD  [facility] Hematology and Oncology    3/25/2021

## 2021-05-04 ENCOUNTER — TELEPHONE (OUTPATIENT)
Dept: ONCOLOGY | Facility: CLINIC | Age: 41
End: 2021-05-04

## 2021-05-04 DIAGNOSIS — Z17.0 MALIGNANT NEOPLASM OF UPPER-OUTER QUADRANT OF LEFT BREAST IN FEMALE, ESTROGEN RECEPTOR POSITIVE (HCC): Primary | ICD-10-CM

## 2021-05-04 DIAGNOSIS — R60.0 LEG EDEMA, LEFT: ICD-10-CM

## 2021-05-04 DIAGNOSIS — C50.412 MALIGNANT NEOPLASM OF UPPER-OUTER QUADRANT OF LEFT BREAST IN FEMALE, ESTROGEN RECEPTOR POSITIVE (HCC): Primary | ICD-10-CM

## 2021-05-04 NOTE — TELEPHONE ENCOUNTER
----- Message from Hermelinda Eason RN sent at 5/4/2021  2:59 PM EDT -----  Regarding: Triage Call - Swelling  Ana Schumacher,    Patient left message on triage that she finished chemo in March and is now having swelling in her left foot and leg.  Her phone number is [phone].      Thanks,    Hermelinda

## 2021-05-04 NOTE — TELEPHONE ENCOUNTER
I called the patient to ask her about her symptoms.  She said a couple of days ago, she noticed her left leg swollen, especially at her ankles and foot..  She has no shortness of breath and continues on blood thinners.  She has no complaints of pain and no other issues.  She said the swelling does go down when she keeps her leg elevated but it does occur when she is up and it is definitely larger than the right leg.  She has not missed any of her eliquis doses.  I talked with Dr. Malloy and she stated that we should do a doppler of left lower leg just to make sure there is not a clot despite eliquis.  I called patient back and scheduled this for [facility] and she will go at 1:45 tomorrow.   I faxed order to [facility] and spoke to patient and she will be there.

## 2021-05-20 NOTE — ANESTHESIA PROCEDURE NOTES
Airway  Urgency: elective    Airway not difficult    General Information and Staff    Patient location during procedure: OR  CRNA: Ramu Mancia CRNA    Indications and Patient Condition  Indications for airway management: airway protection    Preoxygenated: yes  MILS not maintained throughout  Mask difficulty assessment: 1 - vent by mask    Final Airway Details  Final airway type: endotracheal airway      Successful airway: ETT  Cuffed: yes   Successful intubation technique: direct laryngoscopy  Facilitating devices/methods: intubating stylet  Endotracheal tube insertion site: oral  Blade: Man  Blade size: 2  ETT size (mm): 7.0  Cormack-Lehane Classification: grade I - full view of glottis  Placement verified by: chest auscultation and capnometry   Cuff volume (mL): 6  Measured from: lips  ETT to lips (cm): 20  Number of attempts at approach: 1    Additional Comments  Negative epigastric sounds, Breath sound equal bilaterally with symmetric chest rise and fall. Atraumatic, dentition and mucosa unchanged             Diabetes mellitus

## 2021-06-16 ENCOUNTER — TELEPHONE (OUTPATIENT)
Dept: ONCOLOGY | Facility: CLINIC | Age: 41
End: 2021-06-16

## 2021-06-16 NOTE — TELEPHONE ENCOUNTER
Discussed with Monse SHANNON, nothing needed prior to PET next week. Patient verbalized understanding.

## 2021-06-16 NOTE — TELEPHONE ENCOUNTER
Patient called and left message that she has a knot on her scar site from her mastectomy.  Called patient back to assess further. She states that she just noticed it today and it is the size of 1/2 her finger tip along her L chest wall mastectomy scar site.  She states that she only noticed it because she was applying coconut oil after her shower today but denies any pain or other symptoms associated with it. She just wants to make sure there is not anything else she needs to have done prior to her PET scan next week on 6/24.

## 2021-06-24 ENCOUNTER — HOSPITAL ENCOUNTER (OUTPATIENT)
Dept: PET IMAGING | Facility: HOSPITAL | Age: 41
Discharge: HOME | End: 2021-06-24

## 2021-06-24 ENCOUNTER — OFFICE VISIT (OUTPATIENT)
Dept: ONCOLOGY | Facility: CLINIC | Age: 41
End: 2021-06-24

## 2021-06-24 ENCOUNTER — LAB (OUTPATIENT)
Dept: LAB | Facility: HOSPITAL | Age: 41
End: 2021-06-24

## 2021-06-24 VITALS
RESPIRATION RATE: 16 BRPM | HEIGHT: 69 IN | WEIGHT: 184 LBS | TEMPERATURE: 98 F | HEART RATE: 96 BPM | SYSTOLIC BLOOD PRESSURE: 130 MMHG | OXYGEN SATURATION: 99 % | DIASTOLIC BLOOD PRESSURE: 64 MMHG | BODY MASS INDEX: 27.25 KG/M2

## 2021-06-24 DIAGNOSIS — C50.412 MALIGNANT NEOPLASM OF UPPER-OUTER QUADRANT OF LEFT BREAST IN FEMALE, ESTROGEN RECEPTOR POSITIVE (HCC): ICD-10-CM

## 2021-06-24 DIAGNOSIS — Z17.0 MALIGNANT NEOPLASM OF UPPER-OUTER QUADRANT OF LEFT BREAST IN FEMALE, ESTROGEN RECEPTOR POSITIVE (HCC): Primary | ICD-10-CM

## 2021-06-24 DIAGNOSIS — C50.412 MALIGNANT NEOPLASM OF UPPER-OUTER QUADRANT OF LEFT BREAST IN FEMALE, ESTROGEN RECEPTOR POSITIVE (HCC): Primary | ICD-10-CM

## 2021-06-24 DIAGNOSIS — Z17.0 MALIGNANT NEOPLASM OF UPPER-OUTER QUADRANT OF LEFT BREAST IN FEMALE, ESTROGEN RECEPTOR POSITIVE (HCC): ICD-10-CM

## 2021-06-24 LAB
ALBUMIN SERPL-MCNC: 4.2 G/DL (ref 3.5–5.2)
ALBUMIN/GLOB SERPL: 1.6 G/DL
ALP SERPL-CCNC: 57 U/L (ref 39–117)
ALT SERPL W P-5'-P-CCNC: 21 U/L (ref 1–33)
ANION GAP SERPL CALCULATED.3IONS-SCNC: 10 MMOL/L (ref 5–15)
AST SERPL-CCNC: 19 U/L (ref 1–32)
BILIRUB SERPL-MCNC: 0.3 MG/DL (ref 0–1.2)
BUN SERPL-MCNC: 7 MG/DL (ref 6–20)
BUN/CREAT SERPL: 10.1 (ref 7–25)
CALCIUM SPEC-SCNC: 9.4 MG/DL (ref 8.6–10.5)
CHLORIDE SERPL-SCNC: 103 MMOL/L (ref 98–107)
CO2 SERPL-SCNC: 24 MMOL/L (ref 22–29)
CREAT SERPL-MCNC: 0.69 MG/DL (ref 0.57–1)
ERYTHROCYTE [DISTWIDTH] IN BLOOD BY AUTOMATED COUNT: 12.6 % (ref 12.3–15.4)
GFR SERPL CREATININE-BSD FRML MDRD: 94 ML/MIN/1.73
GLOBULIN UR ELPH-MCNC: 2.7 GM/DL
GLUCOSE BLDC GLUCOMTR-MCNC: 99 MG/DL (ref 70–130)
GLUCOSE SERPL-MCNC: 146 MG/DL (ref 65–99)
HCT VFR BLD AUTO: 37.2 % (ref 34–46.6)
HGB BLD-MCNC: 12.6 G/DL (ref 12–15.9)
LYMPHOCYTES # BLD AUTO: 1 10*3/MM3 (ref 0.7–3.1)
LYMPHOCYTES NFR BLD AUTO: 29.1 % (ref 19.6–45.3)
MCH RBC QN AUTO: 31.7 PG (ref 26.6–33)
MCHC RBC AUTO-ENTMCNC: 34 G/DL (ref 31.5–35.7)
MCV RBC AUTO: 93.3 FL (ref 79–97)
MONOCYTES # BLD AUTO: 0.1 10*3/MM3 (ref 0.1–0.9)
MONOCYTES NFR BLD AUTO: 3.9 % (ref 5–12)
NEUTROPHILS NFR BLD AUTO: 2.2 10*3/MM3 (ref 1.7–7)
NEUTROPHILS NFR BLD AUTO: 67 % (ref 42.7–76)
PLATELET # BLD AUTO: 187 10*3/MM3 (ref 140–450)
PMV BLD AUTO: 7 FL (ref 6–12)
POTASSIUM SERPL-SCNC: 3.3 MMOL/L (ref 3.5–5.2)
PROT SERPL-MCNC: 6.9 G/DL (ref 6–8.5)
RBC # BLD AUTO: 3.99 10*6/MM3 (ref 3.77–5.28)
SODIUM SERPL-SCNC: 137 MMOL/L (ref 136–145)
WBC # BLD AUTO: 3.3 10*3/MM3 (ref 3.4–10.8)

## 2021-06-24 PROCEDURE — A9552 F18 FDG: HCPCS | Performed by: INTERNAL MEDICINE

## 2021-06-24 PROCEDURE — 82962 GLUCOSE BLOOD TEST: CPT

## 2021-06-24 PROCEDURE — 78815 PET IMAGE W/CT SKULL-THIGH: CPT

## 2021-06-24 PROCEDURE — 0 FLUDEOXYGLUCOSE F18 SOLUTION: Performed by: INTERNAL MEDICINE

## 2021-06-24 PROCEDURE — 85025 COMPLETE CBC W/AUTO DIFF WBC: CPT

## 2021-06-24 PROCEDURE — 80053 COMPREHEN METABOLIC PANEL: CPT

## 2021-06-24 PROCEDURE — 36415 COLL VENOUS BLD VENIPUNCTURE: CPT

## 2021-06-24 PROCEDURE — 99215 OFFICE O/P EST HI 40 MIN: CPT | Performed by: INTERNAL MEDICINE

## 2021-06-24 RX ORDER — CIPROFLOXACIN 500 MG/1
TABLET, FILM COATED ORAL
COMMUNITY
Start: 2021-06-15 | End: 2021-06-24

## 2021-06-24 RX ORDER — PREDNISONE 20 MG/1
TABLET ORAL
COMMUNITY
Start: 2021-03-26 | End: 2021-08-26

## 2021-06-24 RX ORDER — AMOXICILLIN 500 MG/1
CAPSULE ORAL
COMMUNITY
Start: 2021-06-17 | End: 2021-06-24

## 2021-06-24 RX ORDER — LEVOFLOXACIN 500 MG/1
TABLET, FILM COATED ORAL
COMMUNITY
Start: 2021-03-26 | End: 2021-06-24

## 2021-06-24 RX ADMIN — FLUDEOXYGLUCOSE F18 1 DOSE: 300 INJECTION INTRAVENOUS at 09:53

## 2021-06-24 NOTE — PROGRESS NOTES
PROBLEM LIST:  1. [id number] ER weakly positive (1-3%), CA negative, Her2 negative invasive ductal carcinoma of the left breast  A) presented with a palpable breast mass.  Biopsy showed ER+, CA- Her2- IDC, grade 3.  B) neoadjuvant TAC started July 2018.  Complicated by neutropenic fever after cycle 4.  Dose reduced to 80% for cycles 5 and 6.  C) bilateral mastectomy on 11/27/18.  Pathology showed a 1.7 cm high grade IDC with superficial invasion into the skeletal muscle.  0/2 SLN involved.  D) PET scan on 1/14/2019 showed a single focus of hypermetabolic activity along the left lateral aspect of the breast and chest wall.  On 2/19/2019 she underwent resection of this lesion which showed residual high-grade invasive ductal carcinoma with tumor extending to the deep margin.  Further resection not possible.  Adjuvant radiotherapy completed 5/10/19.  E) 6/15/2019 she started adjuvant Xeloda.   F) PET scan on 2/19/2019 showed persistent hypermetabolic focus adjacent to the left implant.  On 1/21/2020 she underwent wide excision which showed infiltrating, poorly differentiated ductal adenocarcinoma with positive margins, specimen with extended margins negative  G) PET scan August 25 2020 showed hypermetabolic uptake in chest wall.  On 9/11/20, this was excised, with pathology showing a 2.1 cm poorly differentiated carcinoma, margins negative.  ER weakly positive, CA -, Her2-  H) adjuvant chemotherapy with carboplatin and gemzar started 11/11/20.  Admitted with Covid19 infection and neutropenic fever after cycle 1.  Cycle 2 delayed until 12/30/20.  2. Left upper extremity DVT, diagnosed March 2021.  On eliquis.      Subjective      Chief Complaint: follow up breast cancer     HISTORY OF PRESENT ILLNESS:   Ina Ram returns for follow-up.  She says she has been feeling okay.  She has been putting coconut oil on her scar and she noticed over the past week or so that there was an area that felt different with a more  "firm and nodular area.      Objective      /64   Pulse 96   Temp 98 °F (36.7 °C) (Temporal)   Resp 16   Ht 175.3 cm (69.02\")   Wt 83.5 kg (184 lb)   SpO2 99%   BMI 27.16 kg/m²    Vitals:    06/24/21 1346   PainSc: 0-No pain             Performance Status: 0    General: well appearing female in no acute distress  Neuro: alert and oriented  HEENT: sclera anicteric, oropharynx clear  Lymphatics: no cervical, supraclavicular, or axillary adenopathy  Left chest wall: incision well healed.  The entire area is fairly firm but there is a approximately 1 to 1.5 cm or more nodular hard area at the lateral side of her scar  Cardiovascular: regular rate and rhythm, no murmurs  Lungs: clear to auscultation bilaterally  Abdomen: soft, nontender, nondistended.  No palpable organomegaly  Extremeties: no lower extremity edema  Skin: no rashes, lesions, bruising, or petechiae  Psych: mood and affect appropriate    NM Pet Skull Base To Mid Thigh  Narrative: EXAMINATION: NM PET SKULL BASE TO MID THIGH- 06/24/2021     INDICATION: breast cancer; C50.412-Malignant neoplasm of upper-outer  quadrant of left female breast; Z17.0-Estrogen receptor positive status  (ER+)     TECHNIQUE: With fasting blood glucose level of 99 mg/dL, a total of  12.84 mCi of FDG was administered via right wrist vein. Following  appropriate delay, PET and CT images were obtained from the level of the  skull vertex to the mid thighs and fused multiplanar images  reconstructed. The CT scan is an unenhanced low-dose study for reference  to the PET scan only and does not constitute a standard diagnostic CT  scan.     The radiation dose reduction device was turned on for each scan per the  ALARA (As Low as Reasonably Achievable) protocol.      COMPARISON: 03/25/2021 whole-body PET/CT scan     FINDINGS: Patient history indicates previous left breast cancer status  post radiotherapy and chemotherapy. No current complaints. Follow-up.  Previous exam report " from 03/25/2021 indicates probable post treatment  changes in the left chest wall and lingula. Marrow stimulation effect.  Today's study shows small focus of activity in the left lower chest.  There is normal and normal variant uptake elsewhere.     Multiplanar images show no significant asymmetry of uptake in the brain.  No hypermetabolic node or mass is seen in the neck. There is no evidence  of hypermetabolic mediastinal hilar or axillary adenopathy or pulmonary  parenchymal disease. There is a new, small hypermetabolic focus in the  subcutaneous tissues of the left chest, which appears to correspond to a  small low-density possibly necrotic nodule, maximum SUV 6.2. Activity at  this site previously was 2.5. No hypermetabolic chest wall nodule is  seen elsewhere.     Below the diaphragm, there is the usual heterogeneous activity in the  liver. No hypermetabolic adrenal or other solid organ disease is seen.  There is no evidence of hypermetabolic adenopathy. There is expected GI  and  tract uptake. Previously noted marrow stimulation effect is no  longer seen. No hypermetabolic marrow space disease is appreciated.     Impression: 1. Small new but strongly hypermetabolic left chest wall nodule, in the  area of patient's previous mastectomy.  2. Negative PET scan elsewhere.     D:  06/24/2021  E:  06/24/2021       I personally reviewed the imaging studies.      Assessment/Plan   Ina Ram is a 41 y.o. year old female with a stage II B ER weakly positive HER-2 negative breast cancer who returns for follow up.       PET scan shows a worrisome area with increased SUV on the left chest wall in the area of her previous recurrences.  I am very concerned based on the appearance.  I told Ina I will need to talk with Dr. Chaudhary as well as radiation oncology regarding options.  We discussed that doing a more radical resection with flap closure can sometimes be done, but I am not sure it is an option in her  case given her multiple prior surgeries and radiation treatment.  In addition there is certainly no guarantee that she would not continue to have local recurrences after this procedure.  We had previously consider the option of radiation seed implants which may be something to look into at this point if surgery is not available.  I think it is unlikely that chemotherapy at this time would be beneficial given her multiple recurrences after prior lines of treatment.  If we do get additional tissue I will plan to repeat PD-L1 and molecular testing to see if we have any other available targets.    Left upper extremity DVT: Continue Eliquis for now.  This DVT was the extension of significant superficial vein thromboses that resulted from her prior chemotherapy.  She can stop Eliquis if needed for procedures.      Total time of patient care on day of service including time prior to, face to face with patient, and following visit spent in reviewing records, lab results, imaging studies, discussion with patient, and documentation/charting was > 40 minutes.          Fifi Malloy MD  [facility] Hematology and Oncology    6/24/2021

## 2021-07-01 ENCOUNTER — TELEPHONE (OUTPATIENT)
Dept: ONCOLOGY | Facility: CLINIC | Age: 41
End: 2021-07-01

## 2021-07-01 NOTE — TELEPHONE ENCOUNTER
Patient discussed at tumor board.  Dr. PHILIPPE plans for wider excision, potentially with flap reconstruction.  Called patient and left VM.

## 2021-07-06 ENCOUNTER — APPOINTMENT (OUTPATIENT)
Dept: PREADMISSION TESTING | Facility: HOSPITAL | Age: 41
End: 2021-07-06

## 2021-07-06 LAB — SARS-COV-2 RNA PNL SPEC NAA+PROBE: NOT DETECTED

## 2021-07-06 PROCEDURE — C9803 HOPD COVID-19 SPEC COLLECT: HCPCS

## 2021-07-06 PROCEDURE — U0004 COV-19 TEST NON-CDC HGH THRU: HCPCS

## 2021-07-09 ENCOUNTER — LAB REQUISITION (OUTPATIENT)
Dept: LAB | Facility: HOSPITAL | Age: 41
End: 2021-07-09

## 2021-07-09 DIAGNOSIS — C50.812 MALIGNANT NEOPLASM OF OVERLAPPING SITES OF LEFT FEMALE BREAST (HCC): ICD-10-CM

## 2021-07-09 PROCEDURE — 88342 IMHCHEM/IMCYTCHM 1ST ANTB: CPT | Performed by: SURGERY

## 2021-07-09 PROCEDURE — 88305 TISSUE EXAM BY PATHOLOGIST: CPT | Performed by: SURGERY

## 2021-07-09 PROCEDURE — 88360 TUMOR IMMUNOHISTOCHEM/MANUAL: CPT | Performed by: SURGERY

## 2021-08-05 ENCOUNTER — TELEPHONE (OUTPATIENT)
Dept: ONCOLOGY | Facility: CLINIC | Age: 41
End: 2021-08-05

## 2021-08-05 NOTE — TELEPHONE ENCOUNTER
Called patient back and she wanted to discuss her pathology report stating that she has perineural invasion and what that means.  She wanted to know if she should have a PET scan done before Dr. PHILIPPE proceeds with the flap and reconstruction.  She also states that Dr. PHILIPPE mentioned talking with Dr. Malloy and that she might treat her with a hormone blocker pill since the cancer was weakly hormone positive. Informed her that Dr. Malloy was not here but would be back next week and these questions may have to wait to be addressed then. She verbalized understanding.

## 2021-08-10 ENCOUNTER — TELEPHONE (OUTPATIENT)
Dept: ONCOLOGY | Facility: CLINIC | Age: 41
End: 2021-08-10

## 2021-08-10 NOTE — TELEPHONE ENCOUNTER
Called patient to respond to questions.    She would be a candidate for endocrine therapy with weak ER positivity.   Given her history of DVT, the safest option would be OFS plus an AI. Discussed possible side effects.    She has met with plastic surgery.  Ongoing discussions re: possible resection of additional chest wall tissue/flap reconstruction.    Further discussion at her upcoming visit.

## 2021-08-25 ENCOUNTER — TELEPHONE (OUTPATIENT)
Dept: ONCOLOGY | Facility: CLINIC | Age: 41
End: 2021-08-25

## 2021-08-26 ENCOUNTER — TELEMEDICINE (OUTPATIENT)
Dept: ONCOLOGY | Facility: CLINIC | Age: 41
End: 2021-08-26

## 2021-08-26 DIAGNOSIS — Z17.0 MALIGNANT NEOPLASM OF UPPER-OUTER QUADRANT OF LEFT BREAST IN FEMALE, ESTROGEN RECEPTOR POSITIVE (HCC): Primary | ICD-10-CM

## 2021-08-26 DIAGNOSIS — C50.412 MALIGNANT NEOPLASM OF UPPER-OUTER QUADRANT OF LEFT BREAST IN FEMALE, ESTROGEN RECEPTOR POSITIVE (HCC): Primary | ICD-10-CM

## 2021-08-26 PROCEDURE — 99214 OFFICE O/P EST MOD 30 MIN: CPT | Performed by: INTERNAL MEDICINE

## 2021-08-26 RX ORDER — ANASTROZOLE 1 MG/1
1 TABLET ORAL DAILY
Qty: 30 TABLET | Refills: 11 | Status: SHIPPED | OUTPATIENT
Start: 2021-08-26 | End: 2022-08-31 | Stop reason: SDUPTHER

## 2021-08-26 NOTE — PROGRESS NOTES
Video visit via zoom.    PROBLEM LIST:  1. [id number] ER weakly positive (1-3%), OH negative, Her2 negative invasive ductal carcinoma of the left breast  A) presented with a palpable breast mass.  Biopsy showed ER+, OH- Her2- IDC, grade 3.  B) neoadjuvant TAC started July 2018.  Complicated by neutropenic fever after cycle 4.  Dose reduced to 80% for cycles 5 and 6.  C) bilateral mastectomy on 11/27/18.  Pathology showed a 1.7 cm high grade IDC with superficial invasion into the skeletal muscle.  0/2 SLN involved.  D) PET scan on 1/14/2019 showed a single focus of hypermetabolic activity along the left lateral aspect of the breast and chest wall.  On 2/19/2019 she underwent resection of this lesion which showed residual high-grade invasive ductal carcinoma with tumor extending to the deep margin.  Further resection not possible.  Adjuvant radiotherapy completed 5/10/19.  E) 6/15/2019 she started adjuvant Xeloda.   F) PET scan on 2/19/2019 showed persistent hypermetabolic focus adjacent to the left implant.  On 1/21/2020 she underwent wide excision which showed infiltrating, poorly differentiated ductal adenocarcinoma with positive margins, specimen with extended margins negative  G) PET scan August 25 2020 showed hypermetabolic uptake in chest wall.  On 9/11/20, this was excised, with pathology showing a 2.1 cm poorly differentiated carcinoma, margins negative.  ER weakly positive, OH -, Her2-  H) adjuvant chemotherapy with carboplatin and gemzar started 11/11/20.  Admitted with Covid19 infection and neutropenic fever after cycle 1.  Cycle 2 delayed until 12/30/20.  I) PET/CT 6/24/21 showed new hypermetabolic left chest wall nodule, otherwise negative.  resection left chest wall mass 7/9/21, pathology showed recurrent carcinoma, weakly ER+, OH- HER2-.  2. Left upper extremity DVT, diagnosed March 2021.  On eliquis.      Subjective      Chief Complaint: follow up breast cancer     HISTORY OF PRESENT ILLNESS:   Ina KILPATRICK  Yo returns for follow-up.  She says she has been feeling good.  She has an appointment with a plastic surgeon at  next week.  They will discuss doing a Stacie flap rather than the latissimus flap which would apparently allow for more tissue removal.      Objective      There were no vitals taken for this visit.   There were no vitals filed for this visit.          Performance Status: 0    General: well appearing female in no acute distress  Neuro: alert and oriented  HEENT: sclera anicteric, oropharynx clear  Lymphatics: No masses by inspection  Lungs: Respiratory effort appears normal  Skin: no rashes, lesions, bruising, or petechiae  Psych: mood and affect appropriate                Assessment/Plan   Ina Ram is a 41 y.o. year old female with a stage II B ER weakly positive HER-2 negative breast cancer who returns for follow up.       She has been discussing wider excision of the chest wall given her multiple local recurrences.  While I understand the thought process behind waiting 6 months to ensure no disease before proceeding with surgery, in her case the goal of surgery is to try to eradicate residual microscopic disease that could cause recurrence in the future.  She will discuss further with plastic surgery next week.    We will tentatively plan on repeat PET scan in late September or possibly October depending on timing of her surgery.    We also discussed endocrine therapy.  She has a weakly ER positive tumor.  Because of her prior clot I would not recommend tamoxifen.  We will plan to start ovarian suppression with Lupron next week and begin anastrozole.    Left upper extremity DVT: Completed 3 months of Eliquis.      Total time of patient care on day of service including time prior to, face to face with patient, and following visit spent in reviewing records, lab results, imaging studies, discussion with patient, and documentation/charting was > 30 minutes.          Fifi Malloy,  MD  [facility] Hematology and Oncology    8/26/2021

## 2021-08-30 DIAGNOSIS — C50.412 MALIGNANT NEOPLASM OF UPPER-OUTER QUADRANT OF LEFT BREAST IN FEMALE, ESTROGEN RECEPTOR POSITIVE (HCC): ICD-10-CM

## 2021-08-30 DIAGNOSIS — Z17.0 MALIGNANT NEOPLASM OF UPPER-OUTER QUADRANT OF LEFT BREAST IN FEMALE, ESTROGEN RECEPTOR POSITIVE (HCC): ICD-10-CM

## 2021-08-31 ENCOUNTER — HOSPITAL ENCOUNTER (OUTPATIENT)
Dept: ONCOLOGY | Facility: HOSPITAL | Age: 41
Setting detail: INFUSION SERIES
Discharge: HOME | End: 2021-08-31

## 2021-08-31 ENCOUNTER — DOCUMENTATION (OUTPATIENT)
Dept: NUTRITION | Facility: HOSPITAL | Age: 41
End: 2021-08-31

## 2021-08-31 VITALS
RESPIRATION RATE: 16 BRPM | SYSTOLIC BLOOD PRESSURE: 119 MMHG | DIASTOLIC BLOOD PRESSURE: 71 MMHG | WEIGHT: 179 LBS | HEART RATE: 75 BPM | BODY MASS INDEX: 26.51 KG/M2 | TEMPERATURE: 98 F | HEIGHT: 69 IN

## 2021-08-31 DIAGNOSIS — Z17.0 MALIGNANT NEOPLASM OF UPPER-OUTER QUADRANT OF LEFT BREAST IN FEMALE, ESTROGEN RECEPTOR POSITIVE (HCC): Primary | ICD-10-CM

## 2021-08-31 DIAGNOSIS — C50.412 MALIGNANT NEOPLASM OF UPPER-OUTER QUADRANT OF LEFT BREAST IN FEMALE, ESTROGEN RECEPTOR POSITIVE (HCC): Primary | ICD-10-CM

## 2021-08-31 LAB — HCG INTACT+B SERPL-ACNC: <0.1 MIU/ML

## 2021-08-31 PROCEDURE — 96402 CHEMO HORMON ANTINEOPL SQ/IM: CPT

## 2021-08-31 PROCEDURE — 84702 CHORIONIC GONADOTROPIN TEST: CPT | Performed by: INTERNAL MEDICINE

## 2021-08-31 PROCEDURE — 96372 THER/PROPH/DIAG INJ SC/IM: CPT

## 2021-08-31 PROCEDURE — 25010000002 LEUPROLIDE PER 3.75 MG: Performed by: INTERNAL MEDICINE

## 2021-08-31 PROCEDURE — 36415 COLL VENOUS BLD VENIPUNCTURE: CPT

## 2021-08-31 RX ADMIN — LEUPROLIDE ACETATE 3.75 MG: KIT at 13:21

## 2021-09-07 LAB
CYTO UR: NORMAL
LAB AP CASE REPORT: NORMAL
LAB AP CLINICAL INFORMATION: NORMAL
LAB AP CLINICAL INFORMATION: NORMAL
LAB AP DIAGNOSIS COMMENT: NORMAL
LAB AP OUTSIDE REPORT, ADDENDUM: NORMAL
LAB AP SPECIAL STAINS: NORMAL
LAB AP SPECIAL STAINS: NORMAL
PATH REPORT.FINAL DX SPEC: NORMAL
PATH REPORT.GROSS SPEC: NORMAL

## 2021-09-13 ENCOUNTER — TELEPHONE (OUTPATIENT)
Dept: ONCOLOGY | Facility: CLINIC | Age: 41
End: 2021-09-13

## 2021-09-13 DIAGNOSIS — C50.412 MALIGNANT NEOPLASM OF UPPER-OUTER QUADRANT OF LEFT BREAST IN FEMALE, ESTROGEN RECEPTOR POSITIVE (HCC): Primary | ICD-10-CM

## 2021-09-13 DIAGNOSIS — Z17.0 MALIGNANT NEOPLASM OF UPPER-OUTER QUADRANT OF LEFT BREAST IN FEMALE, ESTROGEN RECEPTOR POSITIVE (HCC): Primary | ICD-10-CM

## 2021-09-13 NOTE — TELEPHONE ENCOUNTER
I called patient and told her we would try to get the PET done the last week of Sept with an appt with Dr. Malloy same day of pet.  I also said we could coordinate the lupron injection with the visit since patient lives far away to cut down on travel time.  Patient verbalized appreciation and understanding.

## 2021-09-13 NOTE — TELEPHONE ENCOUNTER
Patient called she wants to know about her PET scan getting ordered? The surgeon wants this done first. Please call to discuss.

## 2021-09-28 ENCOUNTER — APPOINTMENT (OUTPATIENT)
Dept: ONCOLOGY | Facility: HOSPITAL | Age: 41
End: 2021-09-28

## 2021-09-30 ENCOUNTER — HOSPITAL ENCOUNTER (OUTPATIENT)
Dept: ONCOLOGY | Facility: HOSPITAL | Age: 41
Setting detail: INFUSION SERIES
Discharge: HOME | End: 2021-09-30

## 2021-09-30 ENCOUNTER — OFFICE VISIT (OUTPATIENT)
Dept: ONCOLOGY | Facility: CLINIC | Age: 41
End: 2021-09-30

## 2021-09-30 ENCOUNTER — HOSPITAL ENCOUNTER (OUTPATIENT)
Dept: PET IMAGING | Facility: HOSPITAL | Age: 41
Discharge: HOME | End: 2021-09-30

## 2021-09-30 VITALS
TEMPERATURE: 96.9 F | DIASTOLIC BLOOD PRESSURE: 64 MMHG | HEART RATE: 95 BPM | RESPIRATION RATE: 18 BRPM | WEIGHT: 179 LBS | SYSTOLIC BLOOD PRESSURE: 117 MMHG | HEIGHT: 69 IN | BODY MASS INDEX: 26.51 KG/M2

## 2021-09-30 VITALS
SYSTOLIC BLOOD PRESSURE: 117 MMHG | RESPIRATION RATE: 18 BRPM | WEIGHT: 179 LBS | HEIGHT: 69 IN | TEMPERATURE: 96.9 F | BODY MASS INDEX: 26.51 KG/M2 | OXYGEN SATURATION: 98 % | HEART RATE: 95 BPM | DIASTOLIC BLOOD PRESSURE: 64 MMHG

## 2021-09-30 DIAGNOSIS — C50.412 MALIGNANT NEOPLASM OF UPPER-OUTER QUADRANT OF LEFT BREAST IN FEMALE, ESTROGEN RECEPTOR POSITIVE (HCC): Primary | ICD-10-CM

## 2021-09-30 DIAGNOSIS — C50.412 MALIGNANT NEOPLASM OF UPPER-OUTER QUADRANT OF LEFT BREAST IN FEMALE, ESTROGEN RECEPTOR POSITIVE (HCC): ICD-10-CM

## 2021-09-30 DIAGNOSIS — Z17.0 MALIGNANT NEOPLASM OF UPPER-OUTER QUADRANT OF LEFT BREAST IN FEMALE, ESTROGEN RECEPTOR POSITIVE (HCC): ICD-10-CM

## 2021-09-30 DIAGNOSIS — Z17.0 MALIGNANT NEOPLASM OF UPPER-OUTER QUADRANT OF LEFT BREAST IN FEMALE, ESTROGEN RECEPTOR POSITIVE (HCC): Primary | ICD-10-CM

## 2021-09-30 LAB — GLUCOSE BLDC GLUCOMTR-MCNC: 105 MG/DL (ref 70–130)

## 2021-09-30 PROCEDURE — 82962 GLUCOSE BLOOD TEST: CPT

## 2021-09-30 PROCEDURE — A9552 F18 FDG: HCPCS | Performed by: INTERNAL MEDICINE

## 2021-09-30 PROCEDURE — 25010000002 LEUPROLIDE PER 3.75 MG: Performed by: INTERNAL MEDICINE

## 2021-09-30 PROCEDURE — 96402 CHEMO HORMON ANTINEOPL SQ/IM: CPT

## 2021-09-30 PROCEDURE — 99214 OFFICE O/P EST MOD 30 MIN: CPT | Performed by: INTERNAL MEDICINE

## 2021-09-30 PROCEDURE — 96372 THER/PROPH/DIAG INJ SC/IM: CPT

## 2021-09-30 PROCEDURE — 78815 PET IMAGE W/CT SKULL-THIGH: CPT

## 2021-09-30 PROCEDURE — 0 FLUDEOXYGLUCOSE F18 SOLUTION: Performed by: INTERNAL MEDICINE

## 2021-09-30 RX ORDER — MULTIVIT-MIN/IRON/FOLIC ACID/K 18-600-40
1000 CAPSULE ORAL DAILY
COMMUNITY
Start: 2021-08-27

## 2021-09-30 RX ORDER — ALPRAZOLAM 0.5 MG/1
0.5 TABLET ORAL 2 TIMES DAILY PRN
Qty: 60 TABLET | Refills: 0 | Status: SHIPPED | OUTPATIENT
Start: 2021-09-30

## 2021-09-30 RX ADMIN — LEUPROLIDE ACETATE 3.75 MG: KIT at 15:21

## 2021-09-30 RX ADMIN — FLUDEOXYGLUCOSE F18 1 DOSE: 300 INJECTION INTRAVENOUS at 09:38

## 2021-09-30 NOTE — PROGRESS NOTES
PROBLEM LIST:  1. [id number] ER weakly positive (1-3%), AK negative, Her2 negative invasive ductal carcinoma of the left breast  A) presented with a palpable breast mass.  Biopsy showed ER+, AK- Her2- IDC, grade 3.  B) neoadjuvant TAC started July 2018.  Complicated by neutropenic fever after cycle 4.  Dose reduced to 80% for cycles 5 and 6.  C) bilateral mastectomy on 11/27/18.  Pathology showed a 1.7 cm high grade IDC with superficial invasion into the skeletal muscle.  0/2 SLN involved.  D) PET scan on 1/14/2019 showed a single focus of hypermetabolic activity along the left lateral aspect of the breast and chest wall.  On 2/19/2019 she underwent resection of this lesion which showed residual high-grade invasive ductal carcinoma with tumor extending to the deep margin.  Further resection not possible.  Adjuvant radiotherapy completed 5/10/19.  E) 6/15/2019 she started adjuvant Xeloda.   F) PET scan on 2/19/2019 showed persistent hypermetabolic focus adjacent to the left implant.  On 1/21/2020 she underwent wide excision which showed infiltrating, poorly differentiated ductal adenocarcinoma with positive margins, specimen with extended margins negative  G) PET scan August 25 2020 showed hypermetabolic uptake in chest wall.  On 9/11/20, this was excised, with pathology showing a 2.1 cm poorly differentiated carcinoma, margins negative.  ER weakly positive, AK -, Her2-  H) adjuvant chemotherapy with carboplatin and gemzar started 11/11/20.  Admitted with Covid19 infection and neutropenic fever after cycle 1.  Cycle 2 delayed until 12/30/20.  2. Left upper extremity DVT, diagnosed March 2021.  On eliquis.      Subjective      Chief Complaint: follow up breast cancer     HISTORY OF PRESENT ILLNESS:   Ina Ram returns for follow-up.    She is having some hot flashes on lupron and anastrozole.  She does not want to try any medicines for this at this point but will let us know if it worsens.  She has an  "appointment to follow-up with the plastic surgeon at  in November.  They have told her that if she has 2 consecutive normal PET scans that they may be able to consider the surgery after the first of the year.      Objective      /64   Pulse 95   Temp 96.9 °F (36.1 °C) (Temporal)   Resp 18   Ht 175.3 cm (69.02\")   Wt 81.2 kg (179 lb)   SpO2 98%   BMI 26.42 kg/m²    Vitals:    09/30/21 1529   PainSc: 0-No pain             Performance Status: 0    General: well appearing female in no acute distress  Neuro: alert and oriented  HEENT: sclera anicteric, oropharynx clear  Lymphatics: no cervical, supraclavicular, or axillary adenopathy  Left chest wall: incision well healed.  No significant erythema or masses  Cardiovascular: regular rate and rhythm, no murmurs  Lungs: clear to auscultation bilaterally  Abdomen: soft, nontender, nondistended.  No palpable organomegaly  Extremeties: no lower extremity edema  Skin: no rashes, lesions, bruising, or petechiae  Psych: mood and affect appropriate    NM PET/CT Skull Base to Mid Thigh  Narrative: EXAMINATION: NM PET/CT SKULL BASE TO MID THIGH- 09/30/2021     INDICATION: metastatic breast cancer; C50.412-Malignant neoplasm of  upper-outer quadrant of left female breast; Z17.0-Estrogen receptor  positive status (ER+)     TECHNIQUE: With fasting blood glucose level of 105 mg/dL, a total of  12.0 mCi of FDG was administered via right wrist vein. Following  appropriate delay, PET and CT images were obtained from the level of the  skull vertex to the mid thighs and fused multiplanar images  reconstructed. The CT scan is an unenhanced low-dose study for reference  to the PET scan only and does not constitute a standard diagnostic CT  scan.     The radiation dose reduction device was turned on for each scan per the  ALARA (As Low as Reasonably Achievable) protocol.      COMPARISON: 06/24/2021 whole-body PET/CT scan     FINDINGS: Previous exam report indicates small but " strongly  hypermetabolic new left chest wall nodule, subsequently resected,  showing recurrent carcinoma. According to the technologist, the patient  has no current complaints.     Today's study shows only subtle low level diffuse activity at the site  of previously noted left chest wall nodule and no new abnormalities are  seen on the 3-D series.     Multiplanar images show no significant asymmetry of uptake in the brain.  No hypermetabolic node or mass is seen in the neck. There is the usual  uptake in the oropharynx and muscles of phonation. Low-level thyroid  uptake is also noted.     In the chest, no hypermetabolic mediastinal, hilar or pulmonary  parenchymal disease is seen. The previously noted hypermetabolic  subcutaneous focus in the left chest is no longer present. Low-level  diffuse chest wall muscle uptake here has maximum SUV of 2.3, previously  2.9. No new hypermetabolic focus in the chest wall is identified.     Below the diaphragm, there is the usual heterogeneous uptake in the  liver but no hypermetabolic liver lesions are seen. No hypermetabolic  adrenal activity or other solid organ activity is appreciated. There is  no evidence of hypermetabolic lymphadenopathy. Regarding the lower  abdomen and pelvis, no hypermetabolic node or mass is seen. There is  expected GI and  tract uptake. No pathologic marrow space uptake is  appreciated.     Impression: Essentially negative PET scan. Previously noted  hypermetabolic focus of the left chest wall has resolved. Residual low  level diffuse chest wall uptake, typical of previous treatment, and  decreased from prior study. No evidence of new disease.     D:  09/30/2021  E:  09/30/2021       I personally reviewed the imaging studies.      Assessment/Plan   Ina Ram is a 41 y.o. year old female with a stage II B ER weakly positive HER-2 negative breast cancer who returns for follow up.       PET/CT shows no evidence of disease at this time.  We will  continue Lupron and anastrozole for endocrine therapy, given her weakly hormone sensitive tumor.  Plan to repeat PET scan in 3 months in December, with the hopes that she can undergo wider surgical excision with plastics reconstruction after that.    Left upper extremity DVT: Completed Eliquis treatment.      Total time of patient care on day of service including time prior to, face to face with patient, and following visit spent in reviewing records, lab results, imaging studies, discussion with patient, and documentation/charting was > 30 minutes.          Fifi Malloy MD  [facility] Hematology and Oncology    9/30/2021

## 2021-10-28 ENCOUNTER — APPOINTMENT (OUTPATIENT)
Dept: ONCOLOGY | Facility: HOSPITAL | Age: 41
End: 2021-10-28

## 2021-11-01 ENCOUNTER — HOSPITAL ENCOUNTER (OUTPATIENT)
Dept: ONCOLOGY | Facility: HOSPITAL | Age: 41
Setting detail: INFUSION SERIES
Discharge: HOME | End: 2021-11-01

## 2021-11-01 VITALS
HEIGHT: 69 IN | TEMPERATURE: 95.2 F | RESPIRATION RATE: 18 BRPM | BODY MASS INDEX: 26.96 KG/M2 | DIASTOLIC BLOOD PRESSURE: 66 MMHG | HEART RATE: 74 BPM | WEIGHT: 182 LBS | SYSTOLIC BLOOD PRESSURE: 110 MMHG

## 2021-11-01 DIAGNOSIS — Z17.0 MALIGNANT NEOPLASM OF UPPER-OUTER QUADRANT OF LEFT BREAST IN FEMALE, ESTROGEN RECEPTOR POSITIVE (HCC): Primary | ICD-10-CM

## 2021-11-01 DIAGNOSIS — C50.412 MALIGNANT NEOPLASM OF UPPER-OUTER QUADRANT OF LEFT BREAST IN FEMALE, ESTROGEN RECEPTOR POSITIVE (HCC): Primary | ICD-10-CM

## 2021-11-01 PROCEDURE — 25010000002 LEUPROLIDE PER 3.75 MG: Performed by: INTERNAL MEDICINE

## 2021-11-01 PROCEDURE — 96402 CHEMO HORMON ANTINEOPL SQ/IM: CPT

## 2021-11-01 PROCEDURE — 96401 CHEMO ANTI-NEOPL SQ/IM: CPT

## 2021-11-01 RX ADMIN — LEUPROLIDE ACETATE 3.75 MG: KIT at 14:21

## 2021-11-24 ENCOUNTER — APPOINTMENT (OUTPATIENT)
Dept: ONCOLOGY | Facility: HOSPITAL | Age: 41
End: 2021-11-24

## 2021-11-30 ENCOUNTER — HOSPITAL ENCOUNTER (OUTPATIENT)
Dept: ONCOLOGY | Facility: HOSPITAL | Age: 41
Setting detail: INFUSION SERIES
Discharge: HOME | End: 2021-11-30

## 2021-11-30 VITALS
HEIGHT: 69 IN | SYSTOLIC BLOOD PRESSURE: 126 MMHG | RESPIRATION RATE: 16 BRPM | HEART RATE: 85 BPM | BODY MASS INDEX: 26.81 KG/M2 | WEIGHT: 181 LBS | DIASTOLIC BLOOD PRESSURE: 57 MMHG | TEMPERATURE: 98 F

## 2021-11-30 DIAGNOSIS — Z17.0 MALIGNANT NEOPLASM OF UPPER-OUTER QUADRANT OF LEFT BREAST IN FEMALE, ESTROGEN RECEPTOR POSITIVE (HCC): Primary | ICD-10-CM

## 2021-11-30 DIAGNOSIS — C50.412 MALIGNANT NEOPLASM OF UPPER-OUTER QUADRANT OF LEFT BREAST IN FEMALE, ESTROGEN RECEPTOR POSITIVE (HCC): Primary | ICD-10-CM

## 2021-11-30 PROCEDURE — 25010000002 LEUPROLIDE PER 3.75 MG: Performed by: INTERNAL MEDICINE

## 2021-11-30 PROCEDURE — 96372 THER/PROPH/DIAG INJ SC/IM: CPT

## 2021-11-30 PROCEDURE — 96402 CHEMO HORMON ANTINEOPL SQ/IM: CPT

## 2021-11-30 RX ADMIN — LEUPROLIDE ACETATE 3.75 MG: KIT at 15:02

## 2021-12-23 ENCOUNTER — APPOINTMENT (OUTPATIENT)
Dept: ONCOLOGY | Facility: HOSPITAL | Age: 41
End: 2021-12-23

## 2021-12-23 ENCOUNTER — HOSPITAL ENCOUNTER (OUTPATIENT)
Dept: PET IMAGING | Facility: HOSPITAL | Age: 41
Discharge: HOME | End: 2021-12-23

## 2021-12-23 ENCOUNTER — OFFICE VISIT (OUTPATIENT)
Dept: ONCOLOGY | Facility: CLINIC | Age: 41
End: 2021-12-23

## 2021-12-23 VITALS
HEIGHT: 69 IN | WEIGHT: 183 LBS | BODY MASS INDEX: 27.11 KG/M2 | RESPIRATION RATE: 16 BRPM | DIASTOLIC BLOOD PRESSURE: 67 MMHG | TEMPERATURE: 97.5 F | HEART RATE: 75 BPM | SYSTOLIC BLOOD PRESSURE: 110 MMHG | OXYGEN SATURATION: 99 %

## 2021-12-23 DIAGNOSIS — C50.412 MALIGNANT NEOPLASM OF UPPER-OUTER QUADRANT OF LEFT BREAST IN FEMALE, ESTROGEN RECEPTOR POSITIVE (HCC): Primary | ICD-10-CM

## 2021-12-23 DIAGNOSIS — Z17.0 MALIGNANT NEOPLASM OF UPPER-OUTER QUADRANT OF LEFT BREAST IN FEMALE, ESTROGEN RECEPTOR POSITIVE (HCC): ICD-10-CM

## 2021-12-23 DIAGNOSIS — Z17.0 MALIGNANT NEOPLASM OF UPPER-OUTER QUADRANT OF LEFT BREAST IN FEMALE, ESTROGEN RECEPTOR POSITIVE (HCC): Primary | ICD-10-CM

## 2021-12-23 DIAGNOSIS — C50.412 MALIGNANT NEOPLASM OF UPPER-OUTER QUADRANT OF LEFT BREAST IN FEMALE, ESTROGEN RECEPTOR POSITIVE (HCC): ICD-10-CM

## 2021-12-23 LAB — GLUCOSE BLDC GLUCOMTR-MCNC: 103 MG/DL (ref 70–130)

## 2021-12-23 PROCEDURE — 78815 PET IMAGE W/CT SKULL-THIGH: CPT

## 2021-12-23 PROCEDURE — 0 FLUDEOXYGLUCOSE F18 SOLUTION: Performed by: INTERNAL MEDICINE

## 2021-12-23 PROCEDURE — 82962 GLUCOSE BLOOD TEST: CPT

## 2021-12-23 PROCEDURE — A9552 F18 FDG: HCPCS | Performed by: INTERNAL MEDICINE

## 2021-12-23 PROCEDURE — 99214 OFFICE O/P EST MOD 30 MIN: CPT | Performed by: INTERNAL MEDICINE

## 2021-12-23 RX ORDER — LEVOCETIRIZINE DIHYDROCHLORIDE 5 MG/1
TABLET, FILM COATED ORAL DAILY
COMMUNITY
Start: 2021-11-22

## 2021-12-23 RX ORDER — FLUTICASONE PROPIONATE 50 MCG
SPRAY, SUSPENSION (ML) NASAL
COMMUNITY
Start: 2021-11-22 | End: 2022-06-30

## 2021-12-23 RX ADMIN — FLUDEOXYGLUCOSE F18 1 DOSE: 300 INJECTION INTRAVENOUS at 08:25

## 2021-12-23 NOTE — PROGRESS NOTES
PROBLEM LIST:  1. [id number] ER weakly positive (1-3%), [place] negative, Her2 negative invasive ductal carcinoma of the left breast  A) presented with a palpable breast mass.  Biopsy showed ER+, [place]- Her2- IDC, grade 3.  B) neoadjuvant TAC started July 2018.  Complicated by neutropenic fever after cycle 4.  Dose reduced to 80% for cycles 5 and 6.  C) bilateral mastectomy on 11/27/18.  Pathology showed a 1.7 cm high grade IDC with superficial invasion into the skeletal muscle.  0/2 SLN involved.  D) PET scan on 1/14/2019 showed a single focus of hypermetabolic activity along the left lateral aspect of the breast and chest wall.  On 2/19/2019 she underwent resection of this lesion which showed residual high-grade invasive ductal carcinoma with tumor extending to the deep margin.  Further resection not possible.  Adjuvant radiotherapy completed 5/10/19.  E) 6/15/2019 she started adjuvant Xeloda.   F) PET scan on 2/19/2019 showed persistent hypermetabolic focus adjacent to the left implant.  On 1/21/2020 she underwent wide excision which showed infiltrating, poorly differentiated ductal adenocarcinoma with positive margins, specimen with extended margins negative  G) PET scan August 25 2020 showed hypermetabolic uptake in chest wall.  On 9/11/20, this was excised, with pathology showing a 2.1 cm poorly differentiated carcinoma, margins negative.  ER weakly positive, [place] -, Her2-  H) adjuvant chemotherapy with carboplatin and gemzar started 11/11/20.  Admitted with Covid19 infection and neutropenic fever after cycle 1.  Cycle 2 delayed until 12/30/20.  2. Left upper extremity DVT, diagnosed March 2021.  On eliquis.      Subjective      Chief Complaint: follow up breast cancer     HISTORY OF PRESENT ILLNESS:   Ina Ram returns for follow-up.    She says she is doing well.  A lot of the symptoms that she had when she first started the Lupron and anastrozole have improved.  No other complaints.  She had a PET scan this  "morning.      Objective      /67   Pulse 75   Temp 97.5 °F (36.4 °C) (Temporal)   Resp 16   Ht 175.3 cm (69.02\")   Wt 83 kg (183 lb)   SpO2 99%   BMI 27.01 kg/m²    Vitals:    12/23/21 1129   PainSc: 0-No pain             Performance Status: 0    General: well appearing female in no acute distress  Neuro: alert and oriented  HEENT: sclera anicteric, oropharynx clear  Lymphatics: no cervical, supraclavicular, or axillary adenopathy  Left chest wall: incision well healed.  No significant erythema or masses  Extremeties: no lower extremity edema  Skin: no rashes, lesions, bruising, or petechiae  Psych: mood and affect appropriate    NM PET/CT Skull Base to Mid Thigh  Narrative: EXAMINATION: NM PET/CT, SKULL BASE TO MID THIGH-12/23/2021:     INDICATION: Breast cancer; C50.412-Malignant neoplasm of upper-outer  quadrant of left female breast; Z17.0-Estrogen receptor positive status  (ER+).      TECHNIQUE: Fasting blood glucose 103 mg/dL with radiopharmaceutical  injection 11.6 mCi of F-18 FDG injected into the right wrist with the  patient imaged after an appropriate amount of time. CT datasets  performed for fusion analysis alone and should not be used for  diagnostic purposes as these are low-dose protocol.     The radiation dose reduction device was turned on as low as reasonably  achievable for each scan per ALARA protocol.     COMPARISON: PET/CT 09/30/2021, PET/CT 06/24/2021.     FINDINGS:      HEAD AND NECK: Grossly symmetric appearance of cerebral hemispheres on  limited intracranial evaluation. Physiologic activity within the  extraocular muscles, muscles of phonation and tonsils without abnormal  hypermetabolism of the head and neck.     CHEST: Physiologic activity within the left ventricular myocardium.  Postsurgical changes noted from mastectomy without evidence for  recurrence or nodular focus and no abnormal associated chest wall or  pulmonary parenchymal hypermetabolism identified. The osseous " structures  of sternum, ribs and thoracic spine are unremarkable for abnormal  hypermetabolism.     ABDOMEN AND PELVIS: Physiologic activity within the liver, spleen, renal  collecting systems and GI tract without abnormal hypermetabolism of the  abdomen or pelvis. Pelvic structures, proximal thighs and osseous  structures unremarkable for abnormal hypermetabolism.     Impression: Stable, negative PET/CT.     D:  12/23/2021  E:  12/23/2021              I personally reviewed the imaging studies.      Assessment/Plan   Ina Ram is a 41 y.o. year old female with a stage II B ER weakly positive HER-2 negative breast cancer who returns for follow up.       PET/CT shows no evidence of disease at this time.  We will continue Lupron and anastrozole for endocrine therapy, given her weakly hormone sensitive tumor.  She has been seen at  for discussion of wide local excision given her multiple local recurrences.  At this point there are no plans for surgery.  She says that breast reconstruction is not a big priority for her.  We discussed that there is no data that would suggest that more extensive excision of chest wall tissue would help to prevent further recurrences.  For now we will continue to monitor closely and I will plan to repeat PET scan imaging in 3 months.    Left upper extremity DVT: Completed Eliquis treatment.      Total time of patient care on day of service including time prior to, face to face with patient, and following visit spent in reviewing records, lab results, imaging studies, discussion with patient, and documentation/charting was > 31 minutes.          Fifi Malloy MD  [facility] Hematology and Oncology    12/23/2021

## 2021-12-28 ENCOUNTER — HOSPITAL ENCOUNTER (OUTPATIENT)
Dept: ONCOLOGY | Facility: HOSPITAL | Age: 41
Setting detail: INFUSION SERIES
Discharge: HOME | End: 2021-12-28

## 2021-12-28 VITALS
BODY MASS INDEX: 28.02 KG/M2 | DIASTOLIC BLOOD PRESSURE: 72 MMHG | HEART RATE: 84 BPM | TEMPERATURE: 96.9 F | RESPIRATION RATE: 16 BRPM | SYSTOLIC BLOOD PRESSURE: 127 MMHG | HEIGHT: 68 IN | WEIGHT: 184.9 LBS

## 2021-12-28 DIAGNOSIS — C50.412 MALIGNANT NEOPLASM OF UPPER-OUTER QUADRANT OF LEFT BREAST IN FEMALE, ESTROGEN RECEPTOR POSITIVE (HCC): Primary | ICD-10-CM

## 2021-12-28 DIAGNOSIS — Z17.0 MALIGNANT NEOPLASM OF UPPER-OUTER QUADRANT OF LEFT BREAST IN FEMALE, ESTROGEN RECEPTOR POSITIVE (HCC): Primary | ICD-10-CM

## 2021-12-28 PROCEDURE — 25010000002 LEUPROLIDE PER 3.75 MG: Performed by: INTERNAL MEDICINE

## 2021-12-28 PROCEDURE — 96402 CHEMO HORMON ANTINEOPL SQ/IM: CPT

## 2021-12-28 RX ADMIN — LEUPROLIDE ACETATE 3.75 MG: KIT at 13:24

## 2022-01-24 DIAGNOSIS — C50.412 MALIGNANT NEOPLASM OF UPPER-OUTER QUADRANT OF LEFT BREAST IN FEMALE, ESTROGEN RECEPTOR POSITIVE: Primary | ICD-10-CM

## 2022-01-24 DIAGNOSIS — Z17.0 MALIGNANT NEOPLASM OF UPPER-OUTER QUADRANT OF LEFT BREAST IN FEMALE, ESTROGEN RECEPTOR POSITIVE: Primary | ICD-10-CM

## 2022-01-25 ENCOUNTER — APPOINTMENT (OUTPATIENT)
Dept: ONCOLOGY | Facility: HOSPITAL | Age: 42
End: 2022-01-25

## 2022-01-28 ENCOUNTER — APPOINTMENT (OUTPATIENT)
Dept: ONCOLOGY | Facility: HOSPITAL | Age: 42
End: 2022-01-28

## 2022-02-02 ENCOUNTER — HOSPITAL ENCOUNTER (OUTPATIENT)
Dept: ONCOLOGY | Facility: HOSPITAL | Age: 42
Setting detail: INFUSION SERIES
Discharge: HOME | End: 2022-02-02

## 2022-02-02 VITALS
DIASTOLIC BLOOD PRESSURE: 66 MMHG | RESPIRATION RATE: 18 BRPM | HEART RATE: 95 BPM | SYSTOLIC BLOOD PRESSURE: 134 MMHG | HEIGHT: 69 IN | BODY MASS INDEX: 27.11 KG/M2 | WEIGHT: 183 LBS | TEMPERATURE: 96.9 F

## 2022-02-02 DIAGNOSIS — Z17.0 MALIGNANT NEOPLASM OF UPPER-OUTER QUADRANT OF LEFT BREAST IN FEMALE, ESTROGEN RECEPTOR POSITIVE: Primary | ICD-10-CM

## 2022-02-02 DIAGNOSIS — C50.412 MALIGNANT NEOPLASM OF UPPER-OUTER QUADRANT OF LEFT BREAST IN FEMALE, ESTROGEN RECEPTOR POSITIVE: Primary | ICD-10-CM

## 2022-02-02 PROCEDURE — 96372 THER/PROPH/DIAG INJ SC/IM: CPT

## 2022-02-02 PROCEDURE — 25010000002 LEUPROLIDE PER 3.75 MG: Performed by: INTERNAL MEDICINE

## 2022-02-02 PROCEDURE — 96402 CHEMO HORMON ANTINEOPL SQ/IM: CPT

## 2022-02-02 RX ADMIN — LEUPROLIDE ACETATE 3.75 MG: KIT at 15:06

## 2022-02-03 ENCOUNTER — APPOINTMENT (OUTPATIENT)
Dept: ONCOLOGY | Facility: HOSPITAL | Age: 42
End: 2022-02-03

## 2022-03-01 DIAGNOSIS — C50.412 MALIGNANT NEOPLASM OF UPPER-OUTER QUADRANT OF LEFT BREAST IN FEMALE, ESTROGEN RECEPTOR POSITIVE: Primary | ICD-10-CM

## 2022-03-01 DIAGNOSIS — Z17.0 MALIGNANT NEOPLASM OF UPPER-OUTER QUADRANT OF LEFT BREAST IN FEMALE, ESTROGEN RECEPTOR POSITIVE: Primary | ICD-10-CM

## 2022-03-02 ENCOUNTER — HOSPITAL ENCOUNTER (OUTPATIENT)
Dept: ONCOLOGY | Facility: HOSPITAL | Age: 42
Setting detail: INFUSION SERIES
Discharge: HOME | End: 2022-03-02

## 2022-03-02 VITALS
TEMPERATURE: 98.9 F | DIASTOLIC BLOOD PRESSURE: 75 MMHG | WEIGHT: 179.6 LBS | HEIGHT: 69 IN | SYSTOLIC BLOOD PRESSURE: 134 MMHG | RESPIRATION RATE: 16 BRPM | BODY MASS INDEX: 26.6 KG/M2 | HEART RATE: 88 BPM

## 2022-03-02 DIAGNOSIS — C50.412 MALIGNANT NEOPLASM OF UPPER-OUTER QUADRANT OF LEFT BREAST IN FEMALE, ESTROGEN RECEPTOR POSITIVE: Primary | ICD-10-CM

## 2022-03-02 DIAGNOSIS — Z17.0 MALIGNANT NEOPLASM OF UPPER-OUTER QUADRANT OF LEFT BREAST IN FEMALE, ESTROGEN RECEPTOR POSITIVE: Primary | ICD-10-CM

## 2022-03-02 PROCEDURE — 96402 CHEMO HORMON ANTINEOPL SQ/IM: CPT

## 2022-03-02 PROCEDURE — 96372 THER/PROPH/DIAG INJ SC/IM: CPT

## 2022-03-02 PROCEDURE — 25010000002 LEUPROLIDE PER 3.75 MG: Performed by: INTERNAL MEDICINE

## 2022-03-02 RX ADMIN — LEUPROLIDE ACETATE 3.75 MG: KIT at 16:00

## 2022-03-16 ENCOUNTER — TELEPHONE (OUTPATIENT)
Dept: ONCOLOGY | Facility: CLINIC | Age: 42
End: 2022-03-16

## 2022-03-16 NOTE — TELEPHONE ENCOUNTER
Patient called she has a implant on her right breast, and states if feels different. The cancer was on the left side. She called her plastic surgeon, and can't get in for about a month. They suggested she get an ultrasound to get this checked out. She wants to know if this could be ordered? Please call to discuss.

## 2022-03-16 NOTE — TELEPHONE ENCOUNTER
"I called patient back and she said that she felt like the side and rim of her right implant was shifted a bit lower but that the bottom edge of the implant was feeling \"like a crumpled plastic bag\" and she was wondering if she was really imagining it or if she felt like there was something wrong.  I talked with Dr. Malloy and she texted Dr. Don Asencio and he said the best imaging for a leaking implant would be an MRI but that the PET will show if the implant is deflated.  Patient has a PET and appt with Dr. Malloy next Thursday.  Dr. Malloy stated that she can examine the patient next week and go over the results of the PET scan before we decide to do another type of imaging.  Patient was comfortable with that plan.    "

## 2022-03-24 ENCOUNTER — LAB (OUTPATIENT)
Dept: LAB | Facility: HOSPITAL | Age: 42
End: 2022-03-24

## 2022-03-24 ENCOUNTER — OFFICE VISIT (OUTPATIENT)
Dept: ONCOLOGY | Facility: CLINIC | Age: 42
End: 2022-03-24

## 2022-03-24 ENCOUNTER — HOSPITAL ENCOUNTER (OUTPATIENT)
Dept: PET IMAGING | Facility: HOSPITAL | Age: 42
Discharge: HOME | End: 2022-03-24

## 2022-03-24 VITALS
WEIGHT: 178 LBS | SYSTOLIC BLOOD PRESSURE: 140 MMHG | HEIGHT: 69 IN | RESPIRATION RATE: 16 BRPM | TEMPERATURE: 97.1 F | OXYGEN SATURATION: 98 % | HEART RATE: 74 BPM | DIASTOLIC BLOOD PRESSURE: 72 MMHG | BODY MASS INDEX: 26.36 KG/M2

## 2022-03-24 DIAGNOSIS — Z17.0 MALIGNANT NEOPLASM OF UPPER-OUTER QUADRANT OF LEFT BREAST IN FEMALE, ESTROGEN RECEPTOR POSITIVE: ICD-10-CM

## 2022-03-24 DIAGNOSIS — Z17.0 MALIGNANT NEOPLASM OF UPPER-OUTER QUADRANT OF LEFT BREAST IN FEMALE, ESTROGEN RECEPTOR POSITIVE: Primary | ICD-10-CM

## 2022-03-24 DIAGNOSIS — C50.412 MALIGNANT NEOPLASM OF UPPER-OUTER QUADRANT OF LEFT BREAST IN FEMALE, ESTROGEN RECEPTOR POSITIVE: ICD-10-CM

## 2022-03-24 DIAGNOSIS — C50.412 MALIGNANT NEOPLASM OF UPPER-OUTER QUADRANT OF LEFT BREAST IN FEMALE, ESTROGEN RECEPTOR POSITIVE: Primary | ICD-10-CM

## 2022-03-24 LAB
ALBUMIN SERPL-MCNC: 4.7 G/DL (ref 3.5–5.2)
ALBUMIN/GLOB SERPL: 1.6 G/DL
ALP SERPL-CCNC: 65 U/L (ref 39–117)
ALT SERPL W P-5'-P-CCNC: 19 U/L (ref 1–33)
ANION GAP SERPL CALCULATED.3IONS-SCNC: 11 MMOL/L (ref 5–15)
AST SERPL-CCNC: 21 U/L (ref 1–32)
BASOPHILS # BLD AUTO: 0.02 10*3/MM3 (ref 0–0.2)
BASOPHILS NFR BLD AUTO: 0.4 % (ref 0–1.5)
BILIRUB SERPL-MCNC: 0.4 MG/DL (ref 0–1.2)
BUN SERPL-MCNC: 10 MG/DL (ref 6–20)
BUN/CREAT SERPL: 12.8 (ref 7–25)
CALCIUM SPEC-SCNC: 10.1 MG/DL (ref 8.6–10.5)
CHLORIDE SERPL-SCNC: 102 MMOL/L (ref 98–107)
CO2 SERPL-SCNC: 25 MMOL/L (ref 22–29)
CREAT SERPL-MCNC: 0.78 MG/DL (ref 0.57–1)
DEPRECATED RDW RBC AUTO: 41.6 FL (ref 37–54)
EGFRCR SERPLBLD CKD-EPI 2021: 98 ML/MIN/1.73
EOSINOPHIL # BLD AUTO: 0.09 10*3/MM3 (ref 0–0.4)
EOSINOPHIL NFR BLD AUTO: 1.9 % (ref 0.3–6.2)
ERYTHROCYTE [DISTWIDTH] IN BLOOD BY AUTOMATED COUNT: 12.4 % (ref 12.3–15.4)
GLOBULIN UR ELPH-MCNC: 2.9 GM/DL
GLUCOSE BLDC GLUCOMTR-MCNC: 96 MG/DL (ref 70–130)
GLUCOSE SERPL-MCNC: 91 MG/DL (ref 65–99)
HCT VFR BLD AUTO: 39.6 % (ref 34–46.6)
HGB BLD-MCNC: 13.6 G/DL (ref 12–15.9)
IMM GRANULOCYTES # BLD AUTO: 0.01 10*3/MM3 (ref 0–0.05)
IMM GRANULOCYTES NFR BLD AUTO: 0.2 % (ref 0–0.5)
LYMPHOCYTES # BLD AUTO: 1.95 10*3/MM3 (ref 0.7–3.1)
LYMPHOCYTES NFR BLD AUTO: 40.1 % (ref 19.6–45.3)
MCH RBC QN AUTO: 31.7 PG (ref 26.6–33)
MCHC RBC AUTO-ENTMCNC: 34.3 G/DL (ref 31.5–35.7)
MCV RBC AUTO: 92.3 FL (ref 79–97)
MONOCYTES # BLD AUTO: 0.35 10*3/MM3 (ref 0.1–0.9)
MONOCYTES NFR BLD AUTO: 7.2 % (ref 5–12)
NEUTROPHILS NFR BLD AUTO: 2.44 10*3/MM3 (ref 1.7–7)
NEUTROPHILS NFR BLD AUTO: 50.2 % (ref 42.7–76)
NRBC BLD AUTO-RTO: 0 /100 WBC (ref 0–0.2)
PLATELET # BLD AUTO: 175 10*3/MM3 (ref 140–450)
PMV BLD AUTO: 9.6 FL (ref 6–12)
POTASSIUM SERPL-SCNC: 4.1 MMOL/L (ref 3.5–5.2)
PROT SERPL-MCNC: 7.6 G/DL (ref 6–8.5)
RBC # BLD AUTO: 4.29 10*6/MM3 (ref 3.77–5.28)
SODIUM SERPL-SCNC: 138 MMOL/L (ref 136–145)
WBC NRBC COR # BLD: 4.86 10*3/MM3 (ref 3.4–10.8)

## 2022-03-24 PROCEDURE — 82962 GLUCOSE BLOOD TEST: CPT

## 2022-03-24 PROCEDURE — 85025 COMPLETE CBC W/AUTO DIFF WBC: CPT

## 2022-03-24 PROCEDURE — 80053 COMPREHEN METABOLIC PANEL: CPT

## 2022-03-24 PROCEDURE — 99214 OFFICE O/P EST MOD 30 MIN: CPT | Performed by: INTERNAL MEDICINE

## 2022-03-24 PROCEDURE — 78815 PET IMAGE W/CT SKULL-THIGH: CPT

## 2022-03-24 PROCEDURE — 0 FLUDEOXYGLUCOSE F18 SOLUTION: Performed by: INTERNAL MEDICINE

## 2022-03-24 PROCEDURE — A9552 F18 FDG: HCPCS | Performed by: INTERNAL MEDICINE

## 2022-03-24 PROCEDURE — 36415 COLL VENOUS BLD VENIPUNCTURE: CPT

## 2022-03-24 RX ORDER — PREDNISOLONE ACETATE 10 MG/ML
SUSPENSION/ DROPS OPHTHALMIC
COMMUNITY
Start: 2022-01-10 | End: 2022-06-30

## 2022-03-24 RX ADMIN — FLUDEOXYGLUCOSE F18 1 DOSE: 300 INJECTION INTRAVENOUS at 12:06

## 2022-03-24 NOTE — PROGRESS NOTES
PROBLEM LIST:  1. aP2B2T0 ER weakly positive (1-3%), [place] negative, Her2 negative invasive ductal carcinoma of the left breast  A) presented with a palpable breast mass.  Biopsy showed ER+, [place]- Her2- IDC, grade 3.  B) neoadjuvant TAC started July 2018.  Complicated by neutropenic fever after cycle 4.  Dose reduced to 80% for cycles 5 and 6.  C) bilateral mastectomy on 11/27/18.  Pathology showed a 1.7 cm high grade IDC with superficial invasion into the skeletal muscle.  0/2 SLN involved.  D) PET scan on 1/14/2019 showed a single focus of hypermetabolic activity along the left lateral aspect of the breast and chest wall.  On 2/19/2019 she underwent resection of this lesion which showed residual high-grade invasive ductal carcinoma with tumor extending to the deep margin.  Further resection not possible.  Adjuvant radiotherapy completed 5/10/19.  E) 6/15/2019 she started adjuvant Xeloda.   F) PET scan on 2/19/2019 showed persistent hypermetabolic focus adjacent to the left implant.  On 1/21/2020 she underwent wide excision which showed infiltrating, poorly differentiated ductal adenocarcinoma with positive margins, specimen with extended margins negative  G) PET scan August 25 2020 showed hypermetabolic uptake in chest wall.  On 9/11/20, this was excised, with pathology showing a 2.1 cm poorly differentiated carcinoma, margins negative.  ER weakly positive, [place] -, Her2-  H) adjuvant chemotherapy with carboplatin and gemzar started 11/11/20.  Admitted with Covid19 infection and neutropenic fever after cycle 1.  Cycle 2 delayed until 12/30/20.  Completed 4 cycles.  I) chest wall recurrence - resected 7/9/21.  Pathology showed recurrenct ER weakly +, [place] -, Her2- IDC.  Margins negative.  2. Left upper extremity DVT, diagnosed March 2021.  On eliquis.      Subjective      Chief Complaint: follow up breast cancer     HISTORY OF PRESENT ILLNESS:   Ina Ram returns for follow-up.   She is having some hot flashes and  "bone pain.  Otherwise doing pretty well.  She doesn't sleep well sometimes but this is not every night.      Objective      /72   Pulse 74   Temp 97.1 °F (36.2 °C) (Temporal)   Resp 16   Ht 175.3 cm (69.02\")   Wt 80.7 kg (178 lb)   SpO2 98%   BMI 26.27 kg/m²    Vitals:    03/24/22 1534   PainSc: 0-No pain             Performance Status: 0    General: well appearing female in no acute distress  Neuro: alert and oriented  HEENT: sclera anicteric, oropharynx clear  Lymphatics: no cervical, supraclavicular, or axillary adenopathy  Left chest wall: incision well healed.  No significant erythema or masses  Extremeties: no lower extremity edema  Skin: no rashes, lesions, bruising, or petechiae  Psych: mood and affect appropriate    I have reexamined the patient and the results are consistent with the previously documented exam. Fifi Malloy MD        NM PET/CT Skull Base to Mid Thigh  Narrative: DATE OF EXAM: 3/24/2022 11:50 AM     PROCEDURE: NM PET/CT SKULL BASE TO MID THIGH-     INDICATIONS: BREAST CANCER; C50.412-Malignant neoplasm of upper-outer  quadrant of left female breast; Z17.0-Estrogen receptor positive status  (ER+)     COMPARISON: 12/23/2021     TECHNIQUE: 13.04 mCi of F-18 labeled FDG was administered intravenously  followed by PET imaging from the skull base through the mid thighs.  Low-dose non contrast CT imaging of the same body region was performed.  Fused PET-CT images and 3D MIP PET images were utilized for  interpretation. Blood glucose level at the time of injection was 96  mg/dl.     FINDINGS:  The evaluation of the head and neck soft tissues demonstrates no  evidence for significant abnormal radiopharmaceutical accumulation to  indicate malignancy or metastatic disease.     The evaluation of the thoracic soft tissues demonstrates no evidence for  significant radiopharmaceutical accumulation to indicate malignancy or  metastatic disease.     The evaluation of the abdominal and pelvic " soft tissues demonstrates no  significant abnormal radiopharmaceutical accumulation to indicate  malignancy or metastatic disease. The expected physiologic activity  throughout the liver and spleen, GI tract, and collecting system is  noted.     The evaluation of the proximal appendicular and axial skeleton  demonstrates no significant abnormal radiopharmaceutical accumulation to  indicate osseous malignancy or metastatic disease. Likewise, the  evaluation of the peripheral cutaneous soft tissues demonstrates no  evidence for abnormal radiopharmaceutical accumulation to indicate a  cutaneous soft tissue malignancy or metastatic focus.     The review of the CT images demonstrates postoperative changes status  post left mastectomy. A right breast implant is also noted. Breathing  motion is seen throughout the lungs. No acute abnormalities are  identified.     Impression: 1.  No evidence for significant abnormal radiopharmaceutical  accumulation to indicate malignancy or metastatic disease.  2.  Incidental CT findings as indicated in the body of the report.     This report was finalized on 3/24/2022 1:33 PM by Jus Puente MD.       I personally reviewed the imaging studies.      Assessment/Plan   Ina Ram is a 41 y.o. year old female with a stage II B ER weakly positive HER-2 negative breast cancer who returns for follow up.       PET/CT shows no evidence of disease at this time.  We will continue Lupron and anastrozole for endocrine therapy, given her weakly hormone sensitive tumor.  We will repeat pet scan in 3 months, and that will be about a year from her last surgery.  If stable, may consider spacing out imaging to every 4 months.    Hot flashes: discussed that effexor is an option but she does not want to start another medication at this time.    Left upper extremity DVT: Completed Eliquis treatment.      Total time of patient care on day of service including time prior to, face to face with patient,  and following visit spent in reviewing records, lab results, imaging studies, discussion with patient, and documentation/charting was > 33 minutes.          Fifi Malloy MD  [facility] Hematology and Oncology    3/24/2022

## 2022-03-30 ENCOUNTER — HOSPITAL ENCOUNTER (OUTPATIENT)
Dept: ONCOLOGY | Facility: HOSPITAL | Age: 42
Setting detail: INFUSION SERIES
Discharge: HOME | End: 2022-03-30

## 2022-03-30 VITALS
HEIGHT: 69 IN | TEMPERATURE: 97.9 F | BODY MASS INDEX: 26.22 KG/M2 | SYSTOLIC BLOOD PRESSURE: 114 MMHG | DIASTOLIC BLOOD PRESSURE: 66 MMHG | HEART RATE: 99 BPM | WEIGHT: 177 LBS | RESPIRATION RATE: 16 BRPM

## 2022-03-30 DIAGNOSIS — Z17.0 MALIGNANT NEOPLASM OF UPPER-OUTER QUADRANT OF LEFT BREAST IN FEMALE, ESTROGEN RECEPTOR POSITIVE: Primary | ICD-10-CM

## 2022-03-30 DIAGNOSIS — C50.412 MALIGNANT NEOPLASM OF UPPER-OUTER QUADRANT OF LEFT BREAST IN FEMALE, ESTROGEN RECEPTOR POSITIVE: Primary | ICD-10-CM

## 2022-03-30 PROCEDURE — 96402 CHEMO HORMON ANTINEOPL SQ/IM: CPT

## 2022-03-30 PROCEDURE — 96372 THER/PROPH/DIAG INJ SC/IM: CPT

## 2022-03-30 PROCEDURE — 25010000002 LEUPROLIDE PER 3.75 MG: Performed by: INTERNAL MEDICINE

## 2022-03-30 RX ADMIN — LEUPROLIDE ACETATE 3.75 MG: KIT at 15:37

## 2022-04-27 ENCOUNTER — HOSPITAL ENCOUNTER (OUTPATIENT)
Dept: ONCOLOGY | Facility: HOSPITAL | Age: 42
Setting detail: INFUSION SERIES
Discharge: HOME | End: 2022-04-27

## 2022-04-27 ENCOUNTER — HOSPITAL ENCOUNTER (OUTPATIENT)
Dept: BONE DENSITY | Facility: HOSPITAL | Age: 42
Discharge: HOME | End: 2022-04-27
Admitting: INTERNAL MEDICINE

## 2022-04-27 VITALS
HEIGHT: 69 IN | BODY MASS INDEX: 26.35 KG/M2 | TEMPERATURE: 97.8 F | HEART RATE: 75 BPM | WEIGHT: 177.9 LBS | RESPIRATION RATE: 18 BRPM | SYSTOLIC BLOOD PRESSURE: 123 MMHG | DIASTOLIC BLOOD PRESSURE: 69 MMHG

## 2022-04-27 DIAGNOSIS — Z17.0 MALIGNANT NEOPLASM OF UPPER-OUTER QUADRANT OF LEFT BREAST IN FEMALE, ESTROGEN RECEPTOR POSITIVE: Primary | ICD-10-CM

## 2022-04-27 DIAGNOSIS — C50.412 MALIGNANT NEOPLASM OF UPPER-OUTER QUADRANT OF LEFT BREAST IN FEMALE, ESTROGEN RECEPTOR POSITIVE: ICD-10-CM

## 2022-04-27 DIAGNOSIS — Z17.0 MALIGNANT NEOPLASM OF UPPER-OUTER QUADRANT OF LEFT BREAST IN FEMALE, ESTROGEN RECEPTOR POSITIVE: ICD-10-CM

## 2022-04-27 DIAGNOSIS — C50.412 MALIGNANT NEOPLASM OF UPPER-OUTER QUADRANT OF LEFT BREAST IN FEMALE, ESTROGEN RECEPTOR POSITIVE: Primary | ICD-10-CM

## 2022-04-27 PROCEDURE — 25010000002 LEUPROLIDE PER 3.75 MG: Performed by: INTERNAL MEDICINE

## 2022-04-27 PROCEDURE — 77080 DXA BONE DENSITY AXIAL: CPT

## 2022-04-27 PROCEDURE — 96402 CHEMO HORMON ANTINEOPL SQ/IM: CPT

## 2022-04-27 PROCEDURE — 96372 THER/PROPH/DIAG INJ SC/IM: CPT

## 2022-04-27 RX ADMIN — LEUPROLIDE ACETATE 3.75 MG: KIT at 15:22

## 2022-05-25 ENCOUNTER — APPOINTMENT (OUTPATIENT)
Dept: ONCOLOGY | Facility: HOSPITAL | Age: 42
End: 2022-05-25

## 2022-05-26 ENCOUNTER — HOSPITAL ENCOUNTER (OUTPATIENT)
Dept: ONCOLOGY | Facility: HOSPITAL | Age: 42
Setting detail: INFUSION SERIES
Discharge: HOME | End: 2022-05-26

## 2022-05-26 VITALS
TEMPERATURE: 97.8 F | HEART RATE: 66 BPM | HEIGHT: 69 IN | DIASTOLIC BLOOD PRESSURE: 66 MMHG | RESPIRATION RATE: 16 BRPM | BODY MASS INDEX: 26.22 KG/M2 | SYSTOLIC BLOOD PRESSURE: 117 MMHG | WEIGHT: 177 LBS

## 2022-05-26 DIAGNOSIS — C50.412 MALIGNANT NEOPLASM OF UPPER-OUTER QUADRANT OF LEFT BREAST IN FEMALE, ESTROGEN RECEPTOR POSITIVE: Primary | ICD-10-CM

## 2022-05-26 DIAGNOSIS — Z17.0 MALIGNANT NEOPLASM OF UPPER-OUTER QUADRANT OF LEFT BREAST IN FEMALE, ESTROGEN RECEPTOR POSITIVE: Primary | ICD-10-CM

## 2022-05-26 PROCEDURE — 25010000002 LEUPROLIDE PER 3.75 MG: Performed by: INTERNAL MEDICINE

## 2022-05-26 PROCEDURE — 96372 THER/PROPH/DIAG INJ SC/IM: CPT

## 2022-05-26 RX ADMIN — LEUPROLIDE ACETATE 3.75 MG: KIT at 13:24

## 2022-06-24 ENCOUNTER — HOSPITAL ENCOUNTER (OUTPATIENT)
Dept: BONE DENSITY | Facility: HOSPITAL | Age: 42
End: 2022-06-24

## 2022-06-30 ENCOUNTER — HOSPITAL ENCOUNTER (OUTPATIENT)
Dept: PET IMAGING | Facility: HOSPITAL | Age: 42
Discharge: HOME | End: 2022-06-30

## 2022-06-30 ENCOUNTER — HOSPITAL ENCOUNTER (OUTPATIENT)
Dept: ONCOLOGY | Facility: HOSPITAL | Age: 42
Setting detail: INFUSION SERIES
Discharge: HOME | End: 2022-06-30

## 2022-06-30 ENCOUNTER — OFFICE VISIT (OUTPATIENT)
Dept: ONCOLOGY | Facility: CLINIC | Age: 42
End: 2022-06-30

## 2022-06-30 VITALS
HEIGHT: 69 IN | HEART RATE: 83 BPM | SYSTOLIC BLOOD PRESSURE: 127 MMHG | OXYGEN SATURATION: 99 % | DIASTOLIC BLOOD PRESSURE: 68 MMHG | TEMPERATURE: 97.5 F | RESPIRATION RATE: 16 BRPM | BODY MASS INDEX: 25.92 KG/M2 | WEIGHT: 175 LBS

## 2022-06-30 DIAGNOSIS — C50.412 MALIGNANT NEOPLASM OF UPPER-OUTER QUADRANT OF LEFT BREAST IN FEMALE, ESTROGEN RECEPTOR POSITIVE: Primary | ICD-10-CM

## 2022-06-30 DIAGNOSIS — C50.412 MALIGNANT NEOPLASM OF UPPER-OUTER QUADRANT OF LEFT BREAST IN FEMALE, ESTROGEN RECEPTOR POSITIVE: ICD-10-CM

## 2022-06-30 DIAGNOSIS — Z17.0 MALIGNANT NEOPLASM OF UPPER-OUTER QUADRANT OF LEFT BREAST IN FEMALE, ESTROGEN RECEPTOR POSITIVE: ICD-10-CM

## 2022-06-30 DIAGNOSIS — Z17.0 MALIGNANT NEOPLASM OF UPPER-OUTER QUADRANT OF LEFT BREAST IN FEMALE, ESTROGEN RECEPTOR POSITIVE: Primary | ICD-10-CM

## 2022-06-30 LAB — GLUCOSE BLDC GLUCOMTR-MCNC: 97 MG/DL (ref 70–130)

## 2022-06-30 PROCEDURE — 96372 THER/PROPH/DIAG INJ SC/IM: CPT

## 2022-06-30 PROCEDURE — 96402 CHEMO HORMON ANTINEOPL SQ/IM: CPT

## 2022-06-30 PROCEDURE — A9552 F18 FDG: HCPCS | Performed by: INTERNAL MEDICINE

## 2022-06-30 PROCEDURE — 78815 PET IMAGE W/CT SKULL-THIGH: CPT

## 2022-06-30 PROCEDURE — 0 FLUDEOXYGLUCOSE F18 SOLUTION: Performed by: INTERNAL MEDICINE

## 2022-06-30 PROCEDURE — 99214 OFFICE O/P EST MOD 30 MIN: CPT | Performed by: INTERNAL MEDICINE

## 2022-06-30 PROCEDURE — 82962 GLUCOSE BLOOD TEST: CPT

## 2022-06-30 PROCEDURE — 25010000002 LEUPROLIDE PER 3.75 MG: Performed by: INTERNAL MEDICINE

## 2022-06-30 RX ORDER — LEVOFLOXACIN 500 MG/1
TABLET, FILM COATED ORAL
COMMUNITY
Start: 2022-05-05 | End: 2022-06-30

## 2022-06-30 RX ORDER — LEUPROLIDE ACETATE 7.5 MG
KIT INTRAMUSCULAR
COMMUNITY
Start: 2021-08-21

## 2022-06-30 RX ORDER — PROMETHAZINE HYDROCHLORIDE 25 MG/1
TABLET ORAL
COMMUNITY
Start: 2022-03-24

## 2022-06-30 RX ADMIN — LEUPROLIDE ACETATE 3.75 MG: KIT at 15:02

## 2022-06-30 RX ADMIN — FLUDEOXYGLUCOSE F18 1 DOSE: 300 INJECTION INTRAVENOUS at 11:37

## 2022-06-30 NOTE — PROGRESS NOTES
PROBLEM LIST:  1. [id number] ER weakly positive (1-3%), [place] negative, Her2 negative invasive ductal carcinoma of the left breast  A) presented with a palpable breast mass.  Biopsy showed ER+, [place]- Her2- IDC, grade 3.  B) neoadjuvant TAC started July 2018.  Complicated by neutropenic fever after cycle 4.  Dose reduced to 80% for cycles 5 and 6.  C) bilateral mastectomy on 11/27/18.  Pathology showed a 1.7 cm high grade IDC with superficial invasion into the skeletal muscle.  0/2 SLN involved.  D) PET scan on 1/14/2019 showed a single focus of hypermetabolic activity along the left lateral aspect of the breast and chest wall.  On 2/19/2019 she underwent resection of this lesion which showed residual high-grade invasive ductal carcinoma with tumor extending to the deep margin.  Further resection not possible.  Adjuvant radiotherapy completed 5/10/19.  E) 6/15/2019 she started adjuvant Xeloda.   F) PET scan on 2/19/2019 showed persistent hypermetabolic focus adjacent to the left implant.  On 1/21/2020 she underwent wide excision which showed infiltrating, poorly differentiated ductal adenocarcinoma with positive margins, specimen with extended margins negative  G) PET scan August 25 2020 showed hypermetabolic uptake in chest wall.  On 9/11/20, this was excised, with pathology showing a 2.1 cm poorly differentiated carcinoma, margins negative.  ER weakly positive, [place] -, Her2-  H) adjuvant chemotherapy with carboplatin and gemzar started 11/11/20.  Admitted with Covid19 infection and neutropenic fever after cycle 1.  Cycle 2 delayed until 12/30/20.  Completed 4 cycles.  I) chest wall recurrence - resected 7/9/21.  Pathology showed recurrenct ER weakly +, [place] -, Her2- IDC.  Margins negative.  2. Left upper extremity DVT, diagnosed March 2021.  On eliquis.      Subjective      Chief Complaint: follow up breast cancer     HISTORY OF PRESENT ILLNESS:   Ina Ram returns for follow-up.   She continues to have hot flashes  "and night sweats.  She also has some joint aches and stiffness.  Otherwise she is feeling pretty well.    Objective      /68   Pulse 83   Temp 97.5 °F (36.4 °C) (Temporal)   Resp 16   Ht 175.3 cm (69.02\")   Wt 79.4 kg (175 lb)   SpO2 99%   BMI 25.83 kg/m²    Vitals:    06/30/22 1401   PainSc: 0-No pain             Performance Status: 0    General: well appearing female in no acute distress  Neuro: alert and oriented  HEENT: sclera anicteric, oropharynx clear  Lymphatics: no cervical, supraclavicular, or axillary adenopathy  Left chest wall: incision well healed.  No significant erythema or masses  Extremeties: no lower extremity edema  Skin: no rashes, lesions, bruising, or petechiae  Psych: mood and affect appropriate    I have reexamined the patient and the results are consistent with the previously documented exam. Fifi Malloy MD        NM PET/CT Skull Base to Mid Thigh  Narrative: DATE OF EXAM: 6/30/2022 11:25 AM     PROCEDURE: NM PET/CT SKULL BASE TO MID THIGH-     INDICATIONS: breast cancer; C50.412-Malignant neoplasm of upper-outer  quadrant of left female breast; Z17.0-Estrogen receptor positive status  (ER+)     TECHNIQUE: 12.68 mCi of F-18 labeled FDG was administered intravenously  followed by PET imaging from the skull vertex through the mid thighs.  Low-dose non contrast CT imaging of the same body region was performed.  Fused PET-CT images and 3D MIP PET images were utilized for  interpretation. Blood glucose level at the time of injection was 97  mg/dl.      COMPARISON: Subsequent exam for follow-up with prior PET/CT dated  3/24/2022     HEAD AND NECK: Physiologic hypermetabolism of the aerodigestive tract  structures is present, without hypermetabolic cervical adenopathy or  aerodigestive tract mass. Normal hypermetabolism of the cerebral  hemispheres.     CHEST: There is physiologic hypermetabolism of the left ventricular  myocardium. There is otherwise no hypermetabolic adenopathy " or pulmonary  nodularity. Postoperative changes are redemonstrated from prior  mastectomy.     ABDOMEN AND PELVIS: There is physiologic activity of the  gastrointestinal and genitourinary tracts, without focal hypermetabolism  concerning for acute or neoplastic process. Osseous structures are  unremarkable diffusely.     Impression: Stable negative PET/CT without evidence of recurrent or metastatic  disease.     This report was finalized on 6/30/2022 1:19 PM by Nato Pereira.       I personally reviewed the imaging studies.      Assessment & Plan   nIa Ram is a 42 y.o. year old female with a stage II B ER weakly positive HER-2 negative breast cancer who returns for follow up.       Her PET scan again shows no evidence of disease recurrence.  It is now been about a year with no evidence of disease after multiple local recurrences.  We will continue Lupron and anastrozole.  We will space out imaging to every 4 months for the next year.    Arthralgias: Discussed benefits of exercise, she can use over-the-counter NSAIDs, and can try over-the-counter supplements such as glucosamine as well.    Follow-up in 4 months.              Fifi Malloy MD  [facility] Hematology and Oncology    6/30/2022

## 2022-07-28 ENCOUNTER — HOSPITAL ENCOUNTER (OUTPATIENT)
Dept: ONCOLOGY | Facility: HOSPITAL | Age: 42
Setting detail: INFUSION SERIES
Discharge: HOME | End: 2022-07-28

## 2022-07-28 VITALS
TEMPERATURE: 97.7 F | HEART RATE: 73 BPM | RESPIRATION RATE: 18 BRPM | DIASTOLIC BLOOD PRESSURE: 59 MMHG | BODY MASS INDEX: 25.61 KG/M2 | SYSTOLIC BLOOD PRESSURE: 109 MMHG | WEIGHT: 173.5 LBS

## 2022-07-28 DIAGNOSIS — C50.412 MALIGNANT NEOPLASM OF UPPER-OUTER QUADRANT OF LEFT BREAST IN FEMALE, ESTROGEN RECEPTOR POSITIVE: Primary | ICD-10-CM

## 2022-07-28 DIAGNOSIS — Z17.0 MALIGNANT NEOPLASM OF UPPER-OUTER QUADRANT OF LEFT BREAST IN FEMALE, ESTROGEN RECEPTOR POSITIVE: Primary | ICD-10-CM

## 2022-07-28 PROCEDURE — 96402 CHEMO HORMON ANTINEOPL SQ/IM: CPT

## 2022-07-28 PROCEDURE — 25010000002 LEUPROLIDE PER 3.75 MG: Performed by: INTERNAL MEDICINE

## 2022-07-28 PROCEDURE — 96372 THER/PROPH/DIAG INJ SC/IM: CPT

## 2022-07-28 RX ADMIN — LEUPROLIDE ACETATE 3.75 MG: KIT at 14:06

## 2022-08-25 ENCOUNTER — HOSPITAL ENCOUNTER (OUTPATIENT)
Dept: ONCOLOGY | Facility: HOSPITAL | Age: 42
Setting detail: INFUSION SERIES
Discharge: HOME | End: 2022-08-25

## 2022-08-25 VITALS
RESPIRATION RATE: 16 BRPM | WEIGHT: 172 LBS | TEMPERATURE: 98 F | HEIGHT: 69 IN | HEART RATE: 92 BPM | DIASTOLIC BLOOD PRESSURE: 70 MMHG | BODY MASS INDEX: 25.48 KG/M2 | SYSTOLIC BLOOD PRESSURE: 115 MMHG

## 2022-08-25 DIAGNOSIS — C50.412 MALIGNANT NEOPLASM OF UPPER-OUTER QUADRANT OF LEFT BREAST IN FEMALE, ESTROGEN RECEPTOR POSITIVE: Primary | ICD-10-CM

## 2022-08-25 DIAGNOSIS — Z17.0 MALIGNANT NEOPLASM OF UPPER-OUTER QUADRANT OF LEFT BREAST IN FEMALE, ESTROGEN RECEPTOR POSITIVE: Primary | ICD-10-CM

## 2022-08-25 PROCEDURE — 25010000002 LEUPROLIDE PER 3.75 MG: Performed by: INTERNAL MEDICINE

## 2022-08-25 PROCEDURE — 96372 THER/PROPH/DIAG INJ SC/IM: CPT

## 2022-08-25 PROCEDURE — 96402 CHEMO HORMON ANTINEOPL SQ/IM: CPT

## 2022-08-25 RX ADMIN — LEUPROLIDE ACETATE 3.75 MG: KIT at 14:39

## 2022-08-31 RX ORDER — ANASTROZOLE 1 MG/1
1 TABLET ORAL DAILY
Qty: 30 TABLET | Refills: 11 | Status: SHIPPED | OUTPATIENT
Start: 2022-08-31

## 2022-08-31 NOTE — TELEPHONE ENCOUNTER
Caller: NESHA    Relationship: SELF    Best call back number: [phone]    Requested Prescriptions:   Requested Prescriptions     Pending Prescriptions Disp Refills   • anastrozole (ARIMIDEX) 1 MG tablet 30 tablet 11     Sig: Take 1 tablet by mouth Daily.        Pharmacy where request should be sent:    [place] [address]  - [phone] [facility] [phone] FX  [phone]      Does the patient have less than a 3 day supply:  [] Yes  [x] No    Kaylin Lu   08/31/22 11:28 EDT

## 2022-09-22 ENCOUNTER — HOSPITAL ENCOUNTER (OUTPATIENT)
Dept: ONCOLOGY | Facility: HOSPITAL | Age: 42
Setting detail: INFUSION SERIES
Discharge: HOME | End: 2022-09-22

## 2022-09-22 VITALS
HEART RATE: 68 BPM | SYSTOLIC BLOOD PRESSURE: 118 MMHG | BODY MASS INDEX: 25.62 KG/M2 | DIASTOLIC BLOOD PRESSURE: 73 MMHG | RESPIRATION RATE: 18 BRPM | TEMPERATURE: 96.7 F | WEIGHT: 173 LBS | HEIGHT: 69 IN

## 2022-09-22 DIAGNOSIS — Z17.0 MALIGNANT NEOPLASM OF UPPER-OUTER QUADRANT OF LEFT BREAST IN FEMALE, ESTROGEN RECEPTOR POSITIVE: Primary | ICD-10-CM

## 2022-09-22 DIAGNOSIS — C50.412 MALIGNANT NEOPLASM OF UPPER-OUTER QUADRANT OF LEFT BREAST IN FEMALE, ESTROGEN RECEPTOR POSITIVE: Primary | ICD-10-CM

## 2022-09-22 PROCEDURE — 25010000002 LEUPROLIDE PER 3.75 MG: Performed by: INTERNAL MEDICINE

## 2022-09-22 PROCEDURE — 96402 CHEMO HORMON ANTINEOPL SQ/IM: CPT

## 2022-09-22 RX ADMIN — LEUPROLIDE ACETATE 3.75 MG: KIT at 14:40

## 2022-10-20 ENCOUNTER — OFFICE VISIT (OUTPATIENT)
Dept: ONCOLOGY | Facility: CLINIC | Age: 42
End: 2022-10-20

## 2022-10-20 ENCOUNTER — HOSPITAL ENCOUNTER (OUTPATIENT)
Dept: PET IMAGING | Facility: HOSPITAL | Age: 42
Discharge: HOME | End: 2022-10-20

## 2022-10-20 ENCOUNTER — TRANSCRIBE ORDERS (OUTPATIENT)
Dept: CARDIOLOGY | Facility: HOSPITAL | Age: 42
End: 2022-10-20

## 2022-10-20 ENCOUNTER — HOSPITAL ENCOUNTER (OUTPATIENT)
Dept: CARDIOLOGY | Facility: HOSPITAL | Age: 42
Discharge: HOME | End: 2022-10-20

## 2022-10-20 ENCOUNTER — HOSPITAL ENCOUNTER (OUTPATIENT)
Dept: ONCOLOGY | Facility: HOSPITAL | Age: 42
Setting detail: INFUSION SERIES
Discharge: HOME | End: 2022-10-20

## 2022-10-20 ENCOUNTER — LAB (OUTPATIENT)
Dept: LAB | Facility: HOSPITAL | Age: 42
End: 2022-10-20

## 2022-10-20 VITALS
OXYGEN SATURATION: 96 % | TEMPERATURE: 97.5 F | RESPIRATION RATE: 16 BRPM | HEART RATE: 94 BPM | BODY MASS INDEX: 25.92 KG/M2 | DIASTOLIC BLOOD PRESSURE: 68 MMHG | SYSTOLIC BLOOD PRESSURE: 133 MMHG | WEIGHT: 175 LBS | HEIGHT: 69 IN

## 2022-10-20 DIAGNOSIS — Z17.0 MALIGNANT NEOPLASM OF UPPER-OUTER QUADRANT OF LEFT BREAST IN FEMALE, ESTROGEN RECEPTOR POSITIVE: Primary | ICD-10-CM

## 2022-10-20 DIAGNOSIS — Z17.0 MALIGNANT NEOPLASM OF UPPER-OUTER QUADRANT OF LEFT BREAST IN FEMALE, ESTROGEN RECEPTOR POSITIVE: ICD-10-CM

## 2022-10-20 DIAGNOSIS — R00.2 PALPITATIONS: ICD-10-CM

## 2022-10-20 DIAGNOSIS — C50.412 MALIGNANT NEOPLASM OF UPPER-OUTER QUADRANT OF LEFT BREAST IN FEMALE, ESTROGEN RECEPTOR POSITIVE: Primary | ICD-10-CM

## 2022-10-20 DIAGNOSIS — R00.2 PALPITATIONS: Primary | ICD-10-CM

## 2022-10-20 DIAGNOSIS — C50.412 MALIGNANT NEOPLASM OF UPPER-OUTER QUADRANT OF LEFT BREAST IN FEMALE, ESTROGEN RECEPTOR POSITIVE: ICD-10-CM

## 2022-10-20 LAB
ALBUMIN SERPL-MCNC: 4.6 G/DL (ref 3.5–5.2)
ALBUMIN/GLOB SERPL: 1.5 G/DL
ALP SERPL-CCNC: 84 U/L (ref 39–117)
ALT SERPL W P-5'-P-CCNC: 21 U/L (ref 1–33)
ANION GAP SERPL CALCULATED.3IONS-SCNC: 11 MMOL/L (ref 5–15)
AST SERPL-CCNC: 16 U/L (ref 1–32)
BASOPHILS # BLD AUTO: 0.02 10*3/MM3 (ref 0–0.2)
BASOPHILS NFR BLD AUTO: 0.5 % (ref 0–1.5)
BILIRUB SERPL-MCNC: 0.4 MG/DL (ref 0–1.2)
BUN SERPL-MCNC: 10 MG/DL (ref 6–20)
BUN/CREAT SERPL: 13 (ref 7–25)
CALCIUM SPEC-SCNC: 10.1 MG/DL (ref 8.6–10.5)
CHLORIDE SERPL-SCNC: 104 MMOL/L (ref 98–107)
CO2 SERPL-SCNC: 25 MMOL/L (ref 22–29)
CREAT SERPL-MCNC: 0.77 MG/DL (ref 0.57–1)
DEPRECATED RDW RBC AUTO: 42.1 FL (ref 37–54)
EGFRCR SERPLBLD CKD-EPI 2021: 98.9 ML/MIN/1.73
EOSINOPHIL # BLD AUTO: 0.16 10*3/MM3 (ref 0–0.4)
EOSINOPHIL NFR BLD AUTO: 3.6 % (ref 0.3–6.2)
ERYTHROCYTE [DISTWIDTH] IN BLOOD BY AUTOMATED COUNT: 12.2 % (ref 12.3–15.4)
GLOBULIN UR ELPH-MCNC: 3 GM/DL
GLUCOSE BLDC GLUCOMTR-MCNC: 93 MG/DL (ref 70–130)
GLUCOSE SERPL-MCNC: 135 MG/DL (ref 65–99)
HCT VFR BLD AUTO: 38.6 % (ref 34–46.6)
HGB BLD-MCNC: 13.2 G/DL (ref 12–15.9)
IMM GRANULOCYTES # BLD AUTO: 0 10*3/MM3 (ref 0–0.05)
IMM GRANULOCYTES NFR BLD AUTO: 0 % (ref 0–0.5)
LYMPHOCYTES # BLD AUTO: 1.65 10*3/MM3 (ref 0.7–3.1)
LYMPHOCYTES NFR BLD AUTO: 37.5 % (ref 19.6–45.3)
MCH RBC QN AUTO: 32 PG (ref 26.6–33)
MCHC RBC AUTO-ENTMCNC: 34.2 G/DL (ref 31.5–35.7)
MCV RBC AUTO: 93.5 FL (ref 79–97)
MONOCYTES # BLD AUTO: 0.23 10*3/MM3 (ref 0.1–0.9)
MONOCYTES NFR BLD AUTO: 5.2 % (ref 5–12)
NEUTROPHILS NFR BLD AUTO: 2.34 10*3/MM3 (ref 1.7–7)
NEUTROPHILS NFR BLD AUTO: 53.2 % (ref 42.7–76)
PLATELET # BLD AUTO: 174 10*3/MM3 (ref 140–450)
PMV BLD AUTO: 9 FL (ref 6–12)
POTASSIUM SERPL-SCNC: 3.7 MMOL/L (ref 3.5–5.2)
PROT SERPL-MCNC: 7.6 G/DL (ref 6–8.5)
QT INTERVAL: 374 MS
QTC INTERVAL: 426 MS
RBC # BLD AUTO: 4.13 10*6/MM3 (ref 3.77–5.28)
SODIUM SERPL-SCNC: 140 MMOL/L (ref 136–145)
WBC NRBC COR # BLD: 4.4 10*3/MM3 (ref 3.4–10.8)

## 2022-10-20 PROCEDURE — 80053 COMPREHEN METABOLIC PANEL: CPT

## 2022-10-20 PROCEDURE — 93010 ELECTROCARDIOGRAM REPORT: CPT | Performed by: INTERNAL MEDICINE

## 2022-10-20 PROCEDURE — 82962 GLUCOSE BLOOD TEST: CPT

## 2022-10-20 PROCEDURE — 0 FLUDEOXYGLUCOSE F18 SOLUTION: Performed by: INTERNAL MEDICINE

## 2022-10-20 PROCEDURE — 25010000002 LEUPROLIDE PER 3.75 MG: Performed by: INTERNAL MEDICINE

## 2022-10-20 PROCEDURE — 93005 ELECTROCARDIOGRAM TRACING: CPT | Performed by: INTERNAL MEDICINE

## 2022-10-20 PROCEDURE — 36415 COLL VENOUS BLD VENIPUNCTURE: CPT

## 2022-10-20 PROCEDURE — 85025 COMPLETE CBC W/AUTO DIFF WBC: CPT

## 2022-10-20 PROCEDURE — 96402 CHEMO HORMON ANTINEOPL SQ/IM: CPT

## 2022-10-20 PROCEDURE — 99214 OFFICE O/P EST MOD 30 MIN: CPT | Performed by: INTERNAL MEDICINE

## 2022-10-20 PROCEDURE — 96372 THER/PROPH/DIAG INJ SC/IM: CPT

## 2022-10-20 PROCEDURE — A9552 F18 FDG: HCPCS | Performed by: INTERNAL MEDICINE

## 2022-10-20 PROCEDURE — 78815 PET IMAGE W/CT SKULL-THIGH: CPT

## 2022-10-20 RX ORDER — PREDNISONE 20 MG/1
20 TABLET ORAL DAILY
COMMUNITY
Start: 2022-07-30 | End: 2023-02-09

## 2022-10-20 RX ORDER — KETOROLAC TROMETHAMINE 10 MG/1
10 TABLET, FILM COATED ORAL 3 TIMES DAILY PRN
COMMUNITY
Start: 2022-07-30

## 2022-10-20 RX ADMIN — LEUPROLIDE ACETATE 3.75 MG: KIT at 15:07

## 2022-10-20 RX ADMIN — FLUDEOXYGLUCOSE F18 1 DOSE: 300 INJECTION INTRAVENOUS at 10:49

## 2022-10-20 NOTE — PROGRESS NOTES
PROBLEM LIST:  1. [id number] ER weakly positive (1-3%), [place] negative, Her2 negative invasive ductal carcinoma of the left breast  A) presented with a palpable breast mass.  Biopsy showed ER+, [place]- Her2- IDC, grade 3.  B) neoadjuvant TAC started July 2018.  Complicated by neutropenic fever after cycle 4.  Dose reduced to 80% for cycles 5 and 6.  C) bilateral mastectomy on 11/27/18.  Pathology showed a 1.7 cm high grade IDC with superficial invasion into the skeletal muscle.  0/2 SLN involved.  D) PET scan on 1/14/2019 showed a single focus of hypermetabolic activity along the left lateral aspect of the breast and chest wall.  On 2/19/2019 she underwent resection of this lesion which showed residual high-grade invasive ductal carcinoma with tumor extending to the deep margin.  Further resection not possible.  Adjuvant radiotherapy completed 5/10/19.  E) 6/15/2019 she started adjuvant Xeloda.   F) PET scan on 2/19/2019 showed persistent hypermetabolic focus adjacent to the left implant.  On 1/21/2020 she underwent wide excision which showed infiltrating, poorly differentiated ductal adenocarcinoma with positive margins, specimen with extended margins negative  G) PET scan August 25 2020 showed hypermetabolic uptake in chest wall.  On 9/11/20, this was excised, with pathology showing a 2.1 cm poorly differentiated carcinoma, margins negative.  ER weakly positive, [place] -, Her2-  H) adjuvant chemotherapy with carboplatin and gemzar started 11/11/20.  Admitted with Covid19 infection and neutropenic fever after cycle 1.  Cycle 2 delayed until 12/30/20.  Completed 4 cycles.  I) chest wall recurrence - resected 7/9/21.  Pathology showed recurrent ER weakly +, [place] -, Her2- IDC.  Margins negative. Started on lupron and anastrozole.  2. Left upper extremity DVT, diagnosed March 2021.  Treated with eliquis.      Subjective      Chief Complaint: follow up breast cancer     HISTORY OF PRESENT ILLNESS:   Ina Ram returns for  "follow-up.   She continues to have menopausal symptoms.  She also reports some occasional palpitations.  She feels what she describes as a fluttering that lasts only a few seconds, she has some symptoms of weakness and dizziness that accompany this.  It happens sporadically, sometimes once a day sometimes a few times a week.    Objective      /68   Pulse 94   Temp 97.5 °F (36.4 °C) (Temporal)   Resp 16   Ht 175.3 cm (69.02\")   Wt 79.4 kg (175 lb)   SpO2 96%   BMI 25.83 kg/m²    Vitals:    10/20/22 1407   PainSc: 0-No pain             Performance Status: 0    General: well appearing female in no acute distress  Neuro: alert and oriented  HEENT: sclera anicteric, oropharynx clear  Lymphatics: no cervical, supraclavicular, or axillary adenopathy  Left chest wall: incision well healed.  No significant erythema or masses  Extremeties: no lower extremity edema  Skin: no rashes, lesions, bruising, or petechiae  Psych: mood and affect appropriate    I have reexamined the patient and the results are consistent with the previously documented exam. Fifi Malloy MD        NM PET/CT Skull Base to Mid Thigh  Narrative: NM PET/CT SKULL BASE TO MID THIGH-     Date of Exam: 10/20/2022 10:41 AM     Indication: breast cancer; C50.412-Malignant neoplasm of upper-outer  quadrant of left female breast; Z17.0-Estrogen receptor positive status  (ER+).     Comparison Exams: June 30, 2022     Technique: Whole body PET/CT imaging was performed with positron  emission tomography (PET with concurrently acquired computed tomography  (CT) for attenuation correction and anatomical localization); field of  view imaged was the skull base to midthigh. Images were obtained after  one hour of equilibrium.     Blood glucose level: 93 mg/dL.  FDG dosage: 11.72 mCi F-18.     Automated exposure control and iterative reconstruction methods were  used.     FINDINGS:  Neck:     No abnormally enlarged or FDG avid lymph nodes are identified. " No  concerning soft tissue mass is identified.     Chest:     No discrete pulmonary nodule is identified. No abnormally enlarged or  FDG avid lymph nodes are identified. There are changes from mastectomy  with a right breast implant in place.     Abdomen/pelvis:     No abnormally enlarged or FDG avid lymph nodes are identified. No  concerning soft tissue mass is identified.     Bones:     There is mild mucosal disease within the left sphenoid sinus. No  aggressive appearing or FDG avid osseous lesions are identified.     Impression: No evidence of local recurrence or metastasis on this examination.     This report was finalized on 10/20/2022 12:37 PM by Jus Giron MD.       I personally reviewed the imaging studies.      Assessment & Plan   Ina Ram is a 42 y.o. year old female with a stage II B ER weakly positive HER-2 negative breast cancer who returns for follow up.       Her PET scan again shows no evidence of disease recurrence.   We will continue Lupron and anastrozole.  We will continue imaging every 4 months until next summer, and then likely space it out to every 6 months.    Palpitations: We will check an EKG today.  She has had prior anthracycline chemotherapy.  We discussed the option of cardiology referral.  We will wait on this at this point since the symptoms are fairly mild.    Follow-up in 4 months.              Fifi Malloy MD  [facility] Hematology and Oncology    10/20/2022

## 2022-10-21 ENCOUNTER — TELEPHONE (OUTPATIENT)
Dept: ONCOLOGY | Facility: CLINIC | Age: 42
End: 2022-10-21

## 2022-10-21 NOTE — TELEPHONE ENCOUNTER
Call to Ina to notify her that per Dr Malloy, her EKG shows a normal rhythm.  Encouraged Ina to notify the office if her palpitations should worsen and we can refer her to cardiology.  Ina states understood

## 2022-11-17 ENCOUNTER — APPOINTMENT (OUTPATIENT)
Dept: ONCOLOGY | Facility: HOSPITAL | Age: 42
End: 2022-11-17

## 2022-11-21 ENCOUNTER — HOSPITAL ENCOUNTER (OUTPATIENT)
Dept: ONCOLOGY | Facility: HOSPITAL | Age: 42
Setting detail: INFUSION SERIES
Discharge: HOME | End: 2022-11-21

## 2022-11-21 VITALS
BODY MASS INDEX: 25.33 KG/M2 | HEART RATE: 73 BPM | SYSTOLIC BLOOD PRESSURE: 113 MMHG | WEIGHT: 171 LBS | DIASTOLIC BLOOD PRESSURE: 61 MMHG | RESPIRATION RATE: 18 BRPM | TEMPERATURE: 97.6 F | HEIGHT: 69 IN

## 2022-11-21 DIAGNOSIS — Z17.0 MALIGNANT NEOPLASM OF UPPER-OUTER QUADRANT OF LEFT BREAST IN FEMALE, ESTROGEN RECEPTOR POSITIVE: Primary | ICD-10-CM

## 2022-11-21 DIAGNOSIS — C50.412 MALIGNANT NEOPLASM OF UPPER-OUTER QUADRANT OF LEFT BREAST IN FEMALE, ESTROGEN RECEPTOR POSITIVE: Primary | ICD-10-CM

## 2022-11-21 PROCEDURE — 96372 THER/PROPH/DIAG INJ SC/IM: CPT

## 2022-11-21 PROCEDURE — 25010000002 LEUPROLIDE PER 3.75 MG: Performed by: INTERNAL MEDICINE

## 2022-11-21 PROCEDURE — 96402 CHEMO HORMON ANTINEOPL SQ/IM: CPT

## 2022-11-21 RX ADMIN — LEUPROLIDE ACETATE 3.75 MG: KIT at 15:22

## 2022-12-15 ENCOUNTER — APPOINTMENT (OUTPATIENT)
Dept: ONCOLOGY | Facility: HOSPITAL | Age: 42
End: 2022-12-15
Payer: MEDICAID

## 2022-12-19 ENCOUNTER — TELEPHONE (OUTPATIENT)
Dept: ONCOLOGY | Facility: CLINIC | Age: 42
End: 2022-12-19

## 2022-12-19 ENCOUNTER — HOSPITAL ENCOUNTER (OUTPATIENT)
Dept: ONCOLOGY | Facility: HOSPITAL | Age: 42
Setting detail: INFUSION SERIES
Discharge: HOME | End: 2022-12-19
Payer: MEDICAID

## 2022-12-19 VITALS
HEIGHT: 69 IN | TEMPERATURE: 97.8 F | HEART RATE: 68 BPM | BODY MASS INDEX: 25.18 KG/M2 | RESPIRATION RATE: 18 BRPM | WEIGHT: 170 LBS | SYSTOLIC BLOOD PRESSURE: 126 MMHG | DIASTOLIC BLOOD PRESSURE: 68 MMHG

## 2022-12-19 DIAGNOSIS — Z17.0 MALIGNANT NEOPLASM OF UPPER-OUTER QUADRANT OF LEFT BREAST IN FEMALE, ESTROGEN RECEPTOR POSITIVE: Primary | ICD-10-CM

## 2022-12-19 DIAGNOSIS — C50.412 MALIGNANT NEOPLASM OF UPPER-OUTER QUADRANT OF LEFT BREAST IN FEMALE, ESTROGEN RECEPTOR POSITIVE: Primary | ICD-10-CM

## 2022-12-19 DIAGNOSIS — Z85.3 PERSONAL HISTORY OF MALIGNANT NEOPLASM OF BREAST: ICD-10-CM

## 2022-12-19 DIAGNOSIS — Z90.13 H/O BILATERAL MASTECTOMY: ICD-10-CM

## 2022-12-19 DIAGNOSIS — Z90.13 STATUS POST BILATERAL MASTECTOMY: ICD-10-CM

## 2022-12-19 PROCEDURE — 96402 CHEMO HORMON ANTINEOPL SQ/IM: CPT

## 2022-12-19 PROCEDURE — 96401 CHEMO ANTI-NEOPL SQ/IM: CPT

## 2022-12-19 PROCEDURE — 25010000002 LEUPROLIDE PER 3.75 MG: Performed by: INTERNAL MEDICINE

## 2022-12-19 RX ADMIN — LEUPROLIDE ACETATE 3.75 MG: KIT at 13:15

## 2022-12-19 NOTE — TELEPHONE ENCOUNTER
Caller: APRIL    Relationship: Other    Best call back number: [phone]    What is the best time to reach you: ANYTIME    Who are you requesting to speak with (clinical staff, provider,  specific staff member): DR DAI     What was the call regarding: PT NEEDS MASTECTOMY BRAS. THEY NEED AN ORDER FAXED TO [employer]. L800.00 IS THE CODE NEEDED ON THE ORDER. PLEASE LIST QTY 1 FOR MASTECTOMY BRA AND FAX ORDER -[phone].     Do you require a callback: NO CALLBACK REQ UNLESS QUESTIONS

## 2022-12-19 NOTE — TELEPHONE ENCOUNTER
Caller: KODI    Relationship:     Best call back number: [phone]    Who are you requesting to speak with (clinical staff, provider,  specific staff member): CLINICAL    What was the call regarding: KODI IS CALLING STATES A SCRIPT WAS SENT OVER BUT IT WAS INCORRECT  IT NEEDS TO SAY  2 SILICON BREAST PROTHESIS   4 MASTECTOMY BRAS   DX CODE: Z90.13  Z85.3      FAX# [phone]    SHE WOULD ALSO NEED A SURGERY NOTE AS WELL SENT WITH THE SCRIPT      Do you require a callback: N/A

## 2023-01-12 ENCOUNTER — APPOINTMENT (OUTPATIENT)
Dept: ONCOLOGY | Facility: HOSPITAL | Age: 43
End: 2023-01-12
Payer: MEDICAID

## 2023-01-16 ENCOUNTER — APPOINTMENT (OUTPATIENT)
Dept: ONCOLOGY | Facility: HOSPITAL | Age: 43
End: 2023-01-16
Payer: MEDICAID

## 2023-01-17 ENCOUNTER — HOSPITAL ENCOUNTER (OUTPATIENT)
Dept: ONCOLOGY | Facility: HOSPITAL | Age: 43
Setting detail: INFUSION SERIES
Discharge: HOME | End: 2023-01-17
Payer: MEDICAID

## 2023-01-17 VITALS
WEIGHT: 171 LBS | TEMPERATURE: 97.3 F | BODY MASS INDEX: 25.33 KG/M2 | SYSTOLIC BLOOD PRESSURE: 108 MMHG | DIASTOLIC BLOOD PRESSURE: 66 MMHG | RESPIRATION RATE: 16 BRPM | HEART RATE: 85 BPM | HEIGHT: 69 IN

## 2023-01-17 DIAGNOSIS — C50.412 MALIGNANT NEOPLASM OF UPPER-OUTER QUADRANT OF LEFT BREAST IN FEMALE, ESTROGEN RECEPTOR POSITIVE: Primary | ICD-10-CM

## 2023-01-17 DIAGNOSIS — Z17.0 MALIGNANT NEOPLASM OF UPPER-OUTER QUADRANT OF LEFT BREAST IN FEMALE, ESTROGEN RECEPTOR POSITIVE: Primary | ICD-10-CM

## 2023-01-17 PROCEDURE — 96402 CHEMO HORMON ANTINEOPL SQ/IM: CPT

## 2023-01-17 PROCEDURE — 96372 THER/PROPH/DIAG INJ SC/IM: CPT

## 2023-01-17 PROCEDURE — 25010000002 LEUPROLIDE PER 3.75 MG: Performed by: INTERNAL MEDICINE

## 2023-01-17 RX ADMIN — LEUPROLIDE ACETATE 3.75 MG: KIT at 11:01

## 2023-02-09 ENCOUNTER — LAB (OUTPATIENT)
Dept: LAB | Facility: HOSPITAL | Age: 43
End: 2023-02-09
Payer: MEDICAID

## 2023-02-09 ENCOUNTER — HOSPITAL ENCOUNTER (OUTPATIENT)
Dept: PET IMAGING | Facility: HOSPITAL | Age: 43
Discharge: HOME | End: 2023-02-09
Payer: MEDICAID

## 2023-02-09 ENCOUNTER — OFFICE VISIT (OUTPATIENT)
Dept: ONCOLOGY | Facility: CLINIC | Age: 43
End: 2023-02-09
Payer: MEDICAID

## 2023-02-09 ENCOUNTER — APPOINTMENT (OUTPATIENT)
Dept: ONCOLOGY | Facility: HOSPITAL | Age: 43
End: 2023-02-09
Payer: MEDICAID

## 2023-02-09 VITALS
WEIGHT: 174 LBS | HEART RATE: 94 BPM | SYSTOLIC BLOOD PRESSURE: 130 MMHG | HEIGHT: 69 IN | BODY MASS INDEX: 25.77 KG/M2 | DIASTOLIC BLOOD PRESSURE: 81 MMHG | OXYGEN SATURATION: 99 % | TEMPERATURE: 97.5 F | RESPIRATION RATE: 16 BRPM

## 2023-02-09 DIAGNOSIS — Z17.0 MALIGNANT NEOPLASM OF UPPER-OUTER QUADRANT OF LEFT BREAST IN FEMALE, ESTROGEN RECEPTOR POSITIVE: ICD-10-CM

## 2023-02-09 DIAGNOSIS — C50.412 MALIGNANT NEOPLASM OF UPPER-OUTER QUADRANT OF LEFT BREAST IN FEMALE, ESTROGEN RECEPTOR POSITIVE: Primary | ICD-10-CM

## 2023-02-09 DIAGNOSIS — Z17.0 MALIGNANT NEOPLASM OF UPPER-OUTER QUADRANT OF LEFT BREAST IN FEMALE, ESTROGEN RECEPTOR POSITIVE: Primary | ICD-10-CM

## 2023-02-09 DIAGNOSIS — C50.412 MALIGNANT NEOPLASM OF UPPER-OUTER QUADRANT OF LEFT BREAST IN FEMALE, ESTROGEN RECEPTOR POSITIVE: ICD-10-CM

## 2023-02-09 LAB
ALBUMIN SERPL-MCNC: 4.7 G/DL (ref 3.5–5.2)
ALBUMIN/GLOB SERPL: 1.7 G/DL
ALP SERPL-CCNC: 72 U/L (ref 39–117)
ALT SERPL W P-5'-P-CCNC: 20 U/L (ref 1–33)
ANION GAP SERPL CALCULATED.3IONS-SCNC: 12 MMOL/L (ref 5–15)
AST SERPL-CCNC: 16 U/L (ref 1–32)
BASOPHILS # BLD AUTO: 0.01 10*3/MM3 (ref 0–0.2)
BASOPHILS NFR BLD AUTO: 0.2 % (ref 0–1.5)
BILIRUB SERPL-MCNC: 0.3 MG/DL (ref 0–1.2)
BUN SERPL-MCNC: 11 MG/DL (ref 6–20)
BUN/CREAT SERPL: 14.3 (ref 7–25)
CALCIUM SPEC-SCNC: 10.1 MG/DL (ref 8.6–10.5)
CHLORIDE SERPL-SCNC: 104 MMOL/L (ref 98–107)
CO2 SERPL-SCNC: 27 MMOL/L (ref 22–29)
CREAT SERPL-MCNC: 0.77 MG/DL (ref 0.57–1)
DEPRECATED RDW RBC AUTO: 43 FL (ref 37–54)
EGFRCR SERPLBLD CKD-EPI 2021: 98.9 ML/MIN/1.73
EOSINOPHIL # BLD AUTO: 0.13 10*3/MM3 (ref 0–0.4)
EOSINOPHIL NFR BLD AUTO: 2.6 % (ref 0.3–6.2)
ERYTHROCYTE [DISTWIDTH] IN BLOOD BY AUTOMATED COUNT: 12.6 % (ref 12.3–15.4)
GLOBULIN UR ELPH-MCNC: 2.7 GM/DL
GLUCOSE BLDC GLUCOMTR-MCNC: 94 MG/DL (ref 70–130)
GLUCOSE SERPL-MCNC: 110 MG/DL (ref 65–99)
HCT VFR BLD AUTO: 39 % (ref 34–46.6)
HGB BLD-MCNC: 13.1 G/DL (ref 12–15.9)
IMM GRANULOCYTES # BLD AUTO: 0 10*3/MM3 (ref 0–0.05)
IMM GRANULOCYTES NFR BLD AUTO: 0 % (ref 0–0.5)
LYMPHOCYTES # BLD AUTO: 2 10*3/MM3 (ref 0.7–3.1)
LYMPHOCYTES NFR BLD AUTO: 39.8 % (ref 19.6–45.3)
MCH RBC QN AUTO: 31 PG (ref 26.6–33)
MCHC RBC AUTO-ENTMCNC: 33.6 G/DL (ref 31.5–35.7)
MCV RBC AUTO: 92.2 FL (ref 79–97)
MONOCYTES # BLD AUTO: 0.26 10*3/MM3 (ref 0.1–0.9)
MONOCYTES NFR BLD AUTO: 5.2 % (ref 5–12)
NEUTROPHILS NFR BLD AUTO: 2.62 10*3/MM3 (ref 1.7–7)
NEUTROPHILS NFR BLD AUTO: 52.2 % (ref 42.7–76)
PLATELET # BLD AUTO: 176 10*3/MM3 (ref 140–450)
PMV BLD AUTO: 9.2 FL (ref 6–12)
POTASSIUM SERPL-SCNC: 3.9 MMOL/L (ref 3.5–5.2)
PROT SERPL-MCNC: 7.4 G/DL (ref 6–8.5)
RBC # BLD AUTO: 4.23 10*6/MM3 (ref 3.77–5.28)
SODIUM SERPL-SCNC: 143 MMOL/L (ref 136–145)
WBC NRBC COR # BLD: 5.02 10*3/MM3 (ref 3.4–10.8)

## 2023-02-09 PROCEDURE — 80053 COMPREHEN METABOLIC PANEL: CPT

## 2023-02-09 PROCEDURE — 78815 PET IMAGE W/CT SKULL-THIGH: CPT

## 2023-02-09 PROCEDURE — 82962 GLUCOSE BLOOD TEST: CPT

## 2023-02-09 PROCEDURE — 85025 COMPLETE CBC W/AUTO DIFF WBC: CPT

## 2023-02-09 PROCEDURE — 36415 COLL VENOUS BLD VENIPUNCTURE: CPT

## 2023-02-09 PROCEDURE — 99214 OFFICE O/P EST MOD 30 MIN: CPT | Performed by: INTERNAL MEDICINE

## 2023-02-09 PROCEDURE — A9552 F18 FDG: HCPCS | Performed by: INTERNAL MEDICINE

## 2023-02-09 PROCEDURE — 0 FLUDEOXYGLUCOSE F18 SOLUTION: Performed by: INTERNAL MEDICINE

## 2023-02-09 RX ORDER — LEVOFLOXACIN 500 MG/1
1 TABLET, FILM COATED ORAL DAILY
COMMUNITY
Start: 2022-11-19 | End: 2023-02-14

## 2023-02-09 RX ORDER — CHOLECALCIFEROL (VITAMIN D3) 25 MCG
1 CAPSULE ORAL DAILY
COMMUNITY
Start: 2023-01-27

## 2023-02-09 RX ORDER — SPINOSAD 9 MG/ML
SUSPENSION TOPICAL
COMMUNITY
Start: 2023-01-26

## 2023-02-09 RX ORDER — IBUPROFEN 800 MG/1
TABLET ORAL
COMMUNITY
Start: 2022-12-05

## 2023-02-09 RX ADMIN — FLUDEOXYGLUCOSE F18 1 DOSE: 300 INJECTION INTRAVENOUS at 10:32

## 2023-02-09 NOTE — PROGRESS NOTES
PROBLEM LIST:  1. tY3V2K6 ER weakly positive (1-3%), AK negative, Her2 negative invasive ductal carcinoma of the left breast  A) presented with a palpable breast mass.  Biopsy showed ER+, AK- Her2- IDC, grade 3.  B) neoadjuvant TAC started July 2018.  Complicated by neutropenic fever after cycle 4.  Dose reduced to 80% for cycles 5 and 6.  C) bilateral mastectomy on 11/27/18.  Pathology showed a 1.7 cm high grade IDC with superficial invasion into the skeletal muscle.  0/2 SLN involved.  D) PET scan on 1/14/2019 showed a single focus of hypermetabolic activity along the left lateral aspect of the breast and chest wall.  On 2/19/2019 she underwent resection of this lesion which showed residual high-grade invasive ductal carcinoma with tumor extending to the deep margin.  Further resection not possible.  Adjuvant radiotherapy completed 5/10/19.  E) 6/15/2019 she started adjuvant Xeloda.   F) PET scan on 2/19/2019 showed persistent hypermetabolic focus adjacent to the left implant.  On 1/21/2020 she underwent wide excision which showed infiltrating, poorly differentiated ductal adenocarcinoma with positive margins, specimen with extended margins negative  G) PET scan August 25 2020 showed hypermetabolic uptake in chest wall.  On 9/11/20, this was excised, with pathology showing a 2.1 cm poorly differentiated carcinoma, margins negative.  ER weakly positive, AK -, Her2-  H) adjuvant chemotherapy with carboplatin and gemzar started 11/11/20.  Admitted with Covid19 infection and neutropenic fever after cycle 1.  Cycle 2 delayed until 12/30/20.  Completed 4 cycles.  I) chest wall recurrence - resected 7/9/21.  Pathology showed recurrent ER weakly +, AK -, Her2- IDC.  Margins negative. Started on lupron and anastrozole.  2. Left upper extremity DVT, diagnosed March 2021.  Treated with eliquis.      Subjective      Chief Complaint: follow up breast cancer     HISTORY OF PRESENT ILLNESS:   Ina Ram returns for  "follow-up.   She continues to have menopausal symptoms.  The hot flashes are annoying but not terrible.  She says she feels tired a lot and sometimes does not sleep well at night.  Otherwise no new complaints.    Objective      /81   Pulse 94   Temp 97.5 °F (36.4 °C) (Infrared)   Resp 16   Ht 175.3 cm (69.02\")   Wt 78.9 kg (174 lb)   SpO2 99%   BMI 25.68 kg/m²    Vitals:    02/09/23 1342   PainSc: 0-No pain             Performance Status: 0    General: well appearing female in no acute distress  Neuro: alert and oriented  HEENT: sclera anicteric, oropharynx clear  Lymphatics: no cervical, supraclavicular, or axillary adenopathy  Left chest wall: incision well healed.  No significant erythema or masses  Extremeties: no lower extremity edema  Skin: no rashes, lesions, bruising, or petechiae  Psych: mood and affect appropriate    I have reexamined the patient and the results are consistent with the previously documented exam. Fifi Malloy MD        NM PET/CT Skull Base to Mid Thigh  Narrative: NM PET/CT SKULL BASE TO MID THIGH    Date of Exam: 2/9/2023 10:15 AM EST    Indication: breast cancer.    Comparison: PET CT 10/20/2022    Technique: 11.9 mCi of F-18 FDG was administered intravenously. PET imaging was obtained from skull to mid-thigh approximately 60 minutes after radiotracer injection. A low dose non contrast CT was obtained for attenuation correction and anatomic   localization. Fused PET-CT and 3D MIP reconstructions were utilized for image interpretation.  Fasting blood glucose level: 130 mg/dl.    Findings:  Head and neck:  Normal, symmetric physiologic FDG uptake within the brain. There is physiologic uptake in the oropharynx and muscles of phonation. No hypermetabolic neck mass or cervical lymph node.    Chest:  No hypermetabolic pulmonary parenchymal process. No discrete or suspicious lung nodule noting limitations of low-dose CT technique. No enlarged or hypermetabolic mediastinal, hilar, " or axillary lymph nodes.    Abdomen and pelvis:  No focal hypermetabolism to suggest primary or metastatic visceral or jayne disease in the abdomen or pelvis. There is physiologic uptake in the GI and  tracts.    Bones and body wall soft tissues:  Redemonstration of bilateral mastectomy with right breast prosthesis, without associated abnormal hypermetabolism in the chest wall. No focal or suspicious bone marrow uptake.  Impression: Impression:  Negative PET-CT.       Electronically Signed: Gary Lopez    2/9/2023 12:08 PM EST    Workstation ID: [id number]    I personally reviewed the imaging studies.      Assessment & Plan   Ina Ram is a 42 y.o. year old female with a stage II B ER weakly positive HER-2 negative breast cancer who returns for follow up.       Her PET scan again shows no evidence of disease recurrence.   We will continue Lupron and anastrozole.      Follow-up in 4 months with repeat imaging.  If scans are stable at that time we will space out to every 6months.    She asked about possible reconstruction.  I think this would be reasonable to consider when she has been disease-free for 2 years.    We also discussed the option of hysterectomy and oophorectomy.  This would eliminate the need for the Lupron injections.  She will think about this.              iFfi Malloy MD  [facility] Hematology and Oncology    2/9/2023

## 2023-02-14 ENCOUNTER — HOSPITAL ENCOUNTER (OUTPATIENT)
Dept: ONCOLOGY | Facility: HOSPITAL | Age: 43
Discharge: HOME | End: 2023-02-14
Admitting: INTERNAL MEDICINE
Payer: MEDICAID

## 2023-02-14 VITALS
BODY MASS INDEX: 25.62 KG/M2 | HEART RATE: 79 BPM | DIASTOLIC BLOOD PRESSURE: 69 MMHG | WEIGHT: 173 LBS | HEIGHT: 69 IN | SYSTOLIC BLOOD PRESSURE: 123 MMHG | TEMPERATURE: 97.2 F | RESPIRATION RATE: 16 BRPM

## 2023-02-14 DIAGNOSIS — C50.412 MALIGNANT NEOPLASM OF UPPER-OUTER QUADRANT OF LEFT BREAST IN FEMALE, ESTROGEN RECEPTOR POSITIVE: Primary | ICD-10-CM

## 2023-02-14 DIAGNOSIS — Z17.0 MALIGNANT NEOPLASM OF UPPER-OUTER QUADRANT OF LEFT BREAST IN FEMALE, ESTROGEN RECEPTOR POSITIVE: Primary | ICD-10-CM

## 2023-02-14 PROCEDURE — 96372 THER/PROPH/DIAG INJ SC/IM: CPT

## 2023-02-14 PROCEDURE — 25010000002 LEUPROLIDE PER 3.75 MG: Performed by: INTERNAL MEDICINE

## 2023-02-14 PROCEDURE — 96402 CHEMO HORMON ANTINEOPL SQ/IM: CPT

## 2023-02-14 RX ADMIN — LEUPROLIDE ACETATE 3.75 MG: KIT at 15:00

## 2023-02-15 DIAGNOSIS — C50.412 MALIGNANT NEOPLASM OF UPPER-OUTER QUADRANT OF LEFT BREAST IN FEMALE, ESTROGEN RECEPTOR POSITIVE: ICD-10-CM

## 2023-02-15 DIAGNOSIS — Z17.0 MALIGNANT NEOPLASM OF UPPER-OUTER QUADRANT OF LEFT BREAST IN FEMALE, ESTROGEN RECEPTOR POSITIVE: ICD-10-CM

## 2023-02-15 DIAGNOSIS — Z17.0 MALIGNANT NEOPLASM OF UPPER-OUTER QUADRANT OF LEFT BREAST IN FEMALE, ESTROGEN RECEPTOR POSITIVE: Primary | ICD-10-CM

## 2023-02-15 DIAGNOSIS — C50.412 MALIGNANT NEOPLASM OF UPPER-OUTER QUADRANT OF LEFT BREAST IN FEMALE, ESTROGEN RECEPTOR POSITIVE: Primary | ICD-10-CM

## 2023-03-17 ENCOUNTER — HOSPITAL ENCOUNTER (OUTPATIENT)
Dept: ONCOLOGY | Facility: HOSPITAL | Age: 43
Discharge: HOME | End: 2023-03-17
Admitting: INTERNAL MEDICINE
Payer: MEDICAID

## 2023-03-17 VITALS
WEIGHT: 170.2 LBS | BODY MASS INDEX: 25.12 KG/M2 | HEART RATE: 104 BPM | TEMPERATURE: 98 F | DIASTOLIC BLOOD PRESSURE: 71 MMHG | RESPIRATION RATE: 16 BRPM | SYSTOLIC BLOOD PRESSURE: 117 MMHG

## 2023-03-17 DIAGNOSIS — Z17.0 MALIGNANT NEOPLASM OF UPPER-OUTER QUADRANT OF LEFT BREAST IN FEMALE, ESTROGEN RECEPTOR POSITIVE: Primary | ICD-10-CM

## 2023-03-17 DIAGNOSIS — C50.412 MALIGNANT NEOPLASM OF UPPER-OUTER QUADRANT OF LEFT BREAST IN FEMALE, ESTROGEN RECEPTOR POSITIVE: Primary | ICD-10-CM

## 2023-03-17 PROCEDURE — 25010000002 LEUPROLIDE 22.5 MG KIT: Performed by: INTERNAL MEDICINE

## 2023-03-17 PROCEDURE — 96402 CHEMO HORMON ANTINEOPL SQ/IM: CPT

## 2023-03-17 PROCEDURE — 96372 THER/PROPH/DIAG INJ SC/IM: CPT

## 2023-03-17 RX ADMIN — LEUPROLIDE ACETATE 22.5 MG: KIT SUBCUTANEOUS at 14:33

## 2023-05-15 ENCOUNTER — TELEPHONE (OUTPATIENT)
Dept: ONCOLOGY | Facility: CLINIC | Age: 43
End: 2023-05-15
Payer: MEDICAID

## 2023-05-15 NOTE — TELEPHONE ENCOUNTER
Patient called yesterday she had some vision changes everything looked wavy unless she looked right at something, this caused her to be nauseated so  She took a phenergan to help. She still has it a little bit today, but better than yesterday. Please call.

## 2023-05-15 NOTE — TELEPHONE ENCOUNTER
I called patient back and she said yesterday her vision was blurry and wavy and it actually made her feel a little sick to her stomach.  I talked with SHIVA Sesay and with Nelly Roach pharmD and there is a possibility that the aromatase inhibitors can cause traction vision changes that are associated with the retina.  Monse said the patient should stop the anastrozole and be evaluated by an ophthalmologist to make sure there are no abnormalities.  She is to contact us once she has been evaluated.  There is an [facility] office in [place] that has specialists and I gave patient the number.  I told her to let me know if she needs a referral.  She verbalized understanding.     No Residual Tumor Seen Histology Text: There were no malignant cells seen in the sections examined.

## 2023-05-16 ENCOUNTER — TELEPHONE (OUTPATIENT)
Dept: ONCOLOGY | Facility: CLINIC | Age: 43
End: 2023-05-16
Payer: MEDICAID

## 2023-05-16 NOTE — TELEPHONE ENCOUNTER
Per Dr. Malloy, I called patient and told her to follow what options the eye doctor gave and to have the MRI would show the optic nerve.  She verbalized understanding.

## 2023-05-16 NOTE — TELEPHONE ENCOUNTER
Patient called she went to see the Ophthalmologist today Dr. Cline in [place], [place] he said her vision is okay, but she does have an enlarged right optic nerve. He may have to send her out for another opinion or MRI. She is getting the office to fax over the note. Please call.

## 2023-05-17 ENCOUNTER — TELEPHONE (OUTPATIENT)
Dept: ONCOLOGY | Facility: CLINIC | Age: 43
End: 2023-05-17
Payer: MEDICAID

## 2023-05-17 NOTE — TELEPHONE ENCOUNTER
Call from Dr. Cline, optometry.  Unexplained unilateral vision change with normal eye exam.  He plans to f/u within a few weeks and if no resolution will order MRI.

## 2023-06-08 ENCOUNTER — HOSPITAL ENCOUNTER (OUTPATIENT)
Dept: PET IMAGING | Facility: HOSPITAL | Age: 43
Discharge: HOME | End: 2023-06-08
Payer: MEDICARE

## 2023-06-08 ENCOUNTER — OFFICE VISIT (OUTPATIENT)
Dept: ONCOLOGY | Facility: CLINIC | Age: 43
End: 2023-06-08
Payer: MEDICARE

## 2023-06-08 ENCOUNTER — HOSPITAL ENCOUNTER (OUTPATIENT)
Dept: ONCOLOGY | Facility: HOSPITAL | Age: 43
Discharge: HOME | End: 2023-06-08
Admitting: INTERNAL MEDICINE
Payer: MEDICARE

## 2023-06-08 VITALS
WEIGHT: 167.2 LBS | HEIGHT: 69 IN | TEMPERATURE: 97.1 F | BODY MASS INDEX: 24.76 KG/M2 | SYSTOLIC BLOOD PRESSURE: 106 MMHG | RESPIRATION RATE: 16 BRPM | OXYGEN SATURATION: 99 % | DIASTOLIC BLOOD PRESSURE: 71 MMHG | HEART RATE: 81 BPM

## 2023-06-08 DIAGNOSIS — C50.412 MALIGNANT NEOPLASM OF UPPER-OUTER QUADRANT OF LEFT BREAST IN FEMALE, ESTROGEN RECEPTOR POSITIVE: Primary | ICD-10-CM

## 2023-06-08 DIAGNOSIS — C50.412 MALIGNANT NEOPLASM OF UPPER-OUTER QUADRANT OF LEFT BREAST IN FEMALE, ESTROGEN RECEPTOR POSITIVE: ICD-10-CM

## 2023-06-08 DIAGNOSIS — Z17.0 MALIGNANT NEOPLASM OF UPPER-OUTER QUADRANT OF LEFT BREAST IN FEMALE, ESTROGEN RECEPTOR POSITIVE: ICD-10-CM

## 2023-06-08 DIAGNOSIS — Z17.0 MALIGNANT NEOPLASM OF UPPER-OUTER QUADRANT OF LEFT BREAST IN FEMALE, ESTROGEN RECEPTOR POSITIVE: Primary | ICD-10-CM

## 2023-06-08 LAB — GLUCOSE BLDC GLUCOMTR-MCNC: 103 MG/DL (ref 70–130)

## 2023-06-08 PROCEDURE — 99214 OFFICE O/P EST MOD 30 MIN: CPT | Performed by: INTERNAL MEDICINE

## 2023-06-08 PROCEDURE — 82948 REAGENT STRIP/BLOOD GLUCOSE: CPT

## 2023-06-08 PROCEDURE — A9552 F18 FDG: HCPCS | Performed by: INTERNAL MEDICINE

## 2023-06-08 PROCEDURE — 96402 CHEMO HORMON ANTINEOPL SQ/IM: CPT

## 2023-06-08 PROCEDURE — 78815 PET IMAGE W/CT SKULL-THIGH: CPT

## 2023-06-08 PROCEDURE — 96372 THER/PROPH/DIAG INJ SC/IM: CPT

## 2023-06-08 PROCEDURE — 1126F AMNT PAIN NOTED NONE PRSNT: CPT | Performed by: INTERNAL MEDICINE

## 2023-06-08 PROCEDURE — 25010000002 LEUPROLIDE 22.5 MG KIT: Performed by: INTERNAL MEDICINE

## 2023-06-08 PROCEDURE — 0 FLUDEOXYGLUCOSE F18 SOLUTION: Performed by: INTERNAL MEDICINE

## 2023-06-08 RX ORDER — TRIAMCINOLONE ACETONIDE 1 MG/G
CREAM TOPICAL
COMMUNITY
Start: 2023-06-01 | End: 2023-06-08

## 2023-06-08 RX ORDER — LEUPROLIDE ACETATE 22.5 MG
22.5 KIT INTRAMUSCULAR
COMMUNITY
Start: 2023-03-30

## 2023-06-08 RX ORDER — DOXYCYCLINE HYCLATE 100 MG/1
1 CAPSULE ORAL EVERY 12 HOURS SCHEDULED
COMMUNITY
Start: 2023-05-15 | End: 2023-06-08

## 2023-06-08 RX ADMIN — LEUPROLIDE ACETATE 22.5 MG: KIT SUBCUTANEOUS at 14:45

## 2023-06-08 RX ADMIN — FLUDEOXYGLUCOSE F18 1 DOSE: 300 INJECTION INTRAVENOUS at 10:43

## 2023-06-08 NOTE — PROGRESS NOTES
PROBLEM LIST:  1. [id number] ER weakly positive (1-3%), NM negative, Her2 negative invasive ductal carcinoma of the left breast  A) presented with a palpable breast mass.  Biopsy showed ER+, NM- Her2- IDC, grade 3.  B) neoadjuvant TAC started July 2018.  Complicated by neutropenic fever after cycle 4.  Dose reduced to 80% for cycles 5 and 6.  C) bilateral mastectomy on 11/27/18.  Pathology showed a 1.7 cm high grade IDC with superficial invasion into the skeletal muscle.  0/2 SLN involved.  D) PET scan on 1/14/2019 showed a single focus of hypermetabolic activity along the left lateral aspect of the breast and chest wall.  On 2/19/2019 she underwent resection of this lesion which showed residual high-grade invasive ductal carcinoma with tumor extending to the deep margin.  Further resection not possible.  Adjuvant radiotherapy completed 5/10/19.  E) 6/15/2019 she started adjuvant Xeloda.   F) PET scan on 2/19/2019 showed persistent hypermetabolic focus adjacent to the left implant.  On 1/21/2020 she underwent wide excision which showed infiltrating, poorly differentiated ductal adenocarcinoma with positive margins, specimen with extended margins negative  G) PET scan August 25 2020 showed hypermetabolic uptake in chest wall.  On 9/11/20, this was excised, with pathology showing a 2.1 cm poorly differentiated carcinoma, margins negative.  ER weakly positive, NM -, Her2-  H) adjuvant chemotherapy with carboplatin and gemzar started 11/11/20.  Admitted with Covid19 infection and neutropenic fever after cycle 1.  Cycle 2 delayed until 12/30/20.  Completed 4 cycles.  I) chest wall recurrence - resected 7/9/21.  Pathology showed recurrent ER weakly +, NM -, Her2- IDC.  Margins negative. Started on lupron and anastrozole.  2. Left upper extremity DVT, diagnosed March 2021.  Treated with eliquis.      Subjective      Chief Complaint: follow up breast cancer     HISTORY OF PRESENT ILLNESS:   Ina Ram returns for  "follow-up.   She continues to follow with the eye doctor at home.  She says the vision has not changed.  She is wearing sunglasses.  She has follow-up with her eye doctor later this month.  She has been holding the anastrozole for now.    Objective      /71   Pulse 81   Temp 97.1 °F (36.2 °C) (Infrared)   Resp 16   Ht 175.3 cm (69.02\")   Wt 75.8 kg (167 lb 3.2 oz)   SpO2 99%   BMI 24.68 kg/m²    Vitals:    06/08/23 1329   PainSc: 0-No pain             Performance Status: 0    General: well appearing female in no acute distress  Neuro: alert and oriented  HEENT: sclera anicteric, oropharynx clear  Lymphatics: no cervical, supraclavicular, or axillary adenopathy  Left chest wall: incision well healed.  No significant erythema or masses  Extremeties: no lower extremity edema  Skin: no rashes, lesions, bruising, or petechiae  Psych: mood and affect appropriate    I have reexamined the patient and the results are consistent with the previously documented exam. Fifi Malloy MD        NM PET/CT Skull Base to Mid Thigh  Narrative: NM PET/CT SKULL BASE TO MID THIGH    Date of Exam: 6/8/2023 10:30 AM EDT    Indication: History of stage IIb HER2 negative breast cancer, follow-up, bilateral mastectomy November 2018, chest wall recurrence resected July 2021    Comparison: PET/CT 2/9/2023    Technique: 11.63 mCi of F-18 FDG was administered intravenously. PET imaging was obtained from vertex to mid-thigh approximately 60 minutes after radiotracer injection. A low dose non contrast CT was obtained for attenuation correction and anatomic   localization. Fused PET-CT and 3D MIP reconstructions were utilized for image interpretation.  Fasting blood glucose level: 103 mg/dl.    Reference uptake values:  Mediastinum: 2.5 SUVmax  Liver: 3 SUVmax    Findings:  Head and neck:  Physiologic intracranial uptake. No hypermetabolic adenopathy in the neck. Streak artifact from dental amalgam partially limits oral pharyngeal " assessment.    Chest:  No hypermetabolic adenopathy in the chest. Postsurgical changes of bilateral mastectomy again noted. There is a right breast implant which is unchanged. No axillary or subpectoral lymphadenopathy. Heart size normal. Mild coronary artery calcification. No   pericardial no pleural effusion. Trachea and mainstem bronchi are patent. Lungs are without consolidation. No pneumothorax. No suspicious pulmonary nodule or mass. No findings of pneumonia.    Abdomen and pelvis:  No hypermetabolic adenopathy in abdomen or pelvis. Noncontrast liver, spleen, and adrenal glands are without acute abnormality. Pancreas without findings of pancreatitis. No radiodense gallstone. The kidneys are without hydronephrosis. No obstructive   uropathy. Mild bladder wall thickening likely secondary to incomplete distention. Uterus and adnexa without acute abnormality. Negative for pneumoperitoneum. No bowel obstruction. No fluid collection in the abdomen or pelvis. Abdominal aorta without   aneurysm.    Osseous structures:  No findings to indicate osseous metastases. No aggressive osseous lesion or fracture.  Impression: Impression:  No findings of recurrent malignancy or metastatic disease.    Electronically Signed: Phillip Martin    6/8/2023 12:29 PM EDT    Workstation ID: [id number]    I personally reviewed the imaging studies.      Assessment & Plan   Ina Ram is a 43 y.o. year old female with a stage II B ER weakly positive HER-2 negative breast cancer who returns for follow up.       Her PET scan again shows no evidence of disease recurrence.   It has now been 2 years since her last surgery.  We will continue Lupron and anastrozole.    We have briefly held the anastrozole because of some subtle visual changes.  No concerning findings with her eye exam.  I recommended that unless there is any concern after her follow-up with her eye doctor later this month that she should resume anastrozole.    She has met with her  gynecologist and talked about hysterectomy and oophorectomy.  She can do this at any time if she desires.  In the meantime we will continue with Lupron.    Follow-up in 6 months with repeat PET scan.              Fifi Malloy MD  [facility] Hematology and Oncology    6/8/2023

## 2023-06-13 NOTE — TELEPHONE ENCOUNTER
----- Message from Fifi Malloy MD sent at 10/21/2022 12:46 PM EDT -----  Regarding: FW:  Please let her know ekg shows normal rhythm.  She can let us know if worsening palpitations and we can set her up with cardiology  ----- Message -----  From: Interface, Ekg Results In  Sent: 10/20/2022   3:49 PM EDT  To: Fifi Malloy MD     None

## 2023-06-16 DIAGNOSIS — C50.412 MALIGNANT NEOPLASM OF UPPER-OUTER QUADRANT OF LEFT BREAST IN FEMALE, ESTROGEN RECEPTOR POSITIVE: Primary | ICD-10-CM

## 2023-06-16 DIAGNOSIS — Z17.0 MALIGNANT NEOPLASM OF UPPER-OUTER QUADRANT OF LEFT BREAST IN FEMALE, ESTROGEN RECEPTOR POSITIVE: Primary | ICD-10-CM

## 2023-08-31 ENCOUNTER — HOSPITAL ENCOUNTER (OUTPATIENT)
Dept: ONCOLOGY | Facility: HOSPITAL | Age: 43
Discharge: HOME | End: 2023-08-31
Payer: MEDICARE

## 2023-09-06 ENCOUNTER — HOSPITAL ENCOUNTER (OUTPATIENT)
Dept: ONCOLOGY | Facility: HOSPITAL | Age: 43
Discharge: HOME | End: 2023-09-06
Admitting: INTERNAL MEDICINE
Payer: MEDICARE

## 2023-09-06 VITALS
HEART RATE: 63 BPM | BODY MASS INDEX: 24.14 KG/M2 | TEMPERATURE: 97.6 F | SYSTOLIC BLOOD PRESSURE: 115 MMHG | RESPIRATION RATE: 16 BRPM | WEIGHT: 163 LBS | DIASTOLIC BLOOD PRESSURE: 62 MMHG | HEIGHT: 69 IN

## 2023-09-06 DIAGNOSIS — C50.412 MALIGNANT NEOPLASM OF UPPER-OUTER QUADRANT OF LEFT BREAST IN FEMALE, ESTROGEN RECEPTOR POSITIVE: Primary | ICD-10-CM

## 2023-09-06 DIAGNOSIS — Z17.0 MALIGNANT NEOPLASM OF UPPER-OUTER QUADRANT OF LEFT BREAST IN FEMALE, ESTROGEN RECEPTOR POSITIVE: Primary | ICD-10-CM

## 2023-09-06 PROCEDURE — 96402 CHEMO HORMON ANTINEOPL SQ/IM: CPT

## 2023-09-06 PROCEDURE — 25010000002 LEUPROLIDE 22.5 MG KIT: Performed by: INTERNAL MEDICINE

## 2023-09-06 RX ADMIN — LEUPROLIDE ACETATE 22.5 MG: KIT SUBCUTANEOUS at 13:03

## 2023-11-30 ENCOUNTER — OFFICE VISIT (OUTPATIENT)
Dept: ONCOLOGY | Facility: CLINIC | Age: 43
End: 2023-11-30
Payer: MEDICARE

## 2023-11-30 ENCOUNTER — APPOINTMENT (OUTPATIENT)
Dept: ONCOLOGY | Facility: HOSPITAL | Age: 43
End: 2023-11-30
Payer: MEDICARE

## 2023-11-30 ENCOUNTER — HOSPITAL ENCOUNTER (OUTPATIENT)
Dept: CT IMAGING | Facility: HOSPITAL | Age: 43
Discharge: HOME | End: 2023-11-30
Admitting: INTERNAL MEDICINE
Payer: MEDICARE

## 2023-11-30 VITALS
HEART RATE: 89 BPM | SYSTOLIC BLOOD PRESSURE: 110 MMHG | BODY MASS INDEX: 23.99 KG/M2 | WEIGHT: 162 LBS | HEIGHT: 69 IN | OXYGEN SATURATION: 98 % | RESPIRATION RATE: 12 BRPM | DIASTOLIC BLOOD PRESSURE: 60 MMHG | TEMPERATURE: 97.3 F

## 2023-11-30 DIAGNOSIS — C50.412 MALIGNANT NEOPLASM OF UPPER-OUTER QUADRANT OF LEFT BREAST IN FEMALE, ESTROGEN RECEPTOR POSITIVE: Primary | ICD-10-CM

## 2023-11-30 DIAGNOSIS — C50.412 MALIGNANT NEOPLASM OF UPPER-OUTER QUADRANT OF LEFT BREAST IN FEMALE, ESTROGEN RECEPTOR POSITIVE: ICD-10-CM

## 2023-11-30 DIAGNOSIS — Z17.0 MALIGNANT NEOPLASM OF UPPER-OUTER QUADRANT OF LEFT BREAST IN FEMALE, ESTROGEN RECEPTOR POSITIVE: ICD-10-CM

## 2023-11-30 DIAGNOSIS — Z17.0 MALIGNANT NEOPLASM OF UPPER-OUTER QUADRANT OF LEFT BREAST IN FEMALE, ESTROGEN RECEPTOR POSITIVE: Primary | ICD-10-CM

## 2023-11-30 PROCEDURE — 74177 CT ABD & PELVIS W/CONTRAST: CPT

## 2023-11-30 PROCEDURE — 25510000001 IOPAMIDOL 61 % SOLUTION: Performed by: INTERNAL MEDICINE

## 2023-11-30 PROCEDURE — 71260 CT THORAX DX C+: CPT

## 2023-11-30 RX ADMIN — IOPAMIDOL 85 ML: 612 INJECTION, SOLUTION INTRAVENOUS at 11:48

## 2023-11-30 NOTE — PROGRESS NOTES
PROBLEM LIST:  1. yS1W0Y0 ER weakly positive (1-3%), DC negative, Her2 negative invasive ductal carcinoma of the left breast  A) presented with a palpable breast mass.  Biopsy showed ER+, DC- Her2- IDC, grade 3.  B) neoadjuvant TAC started July 2018.  Complicated by neutropenic fever after cycle 4.  Dose reduced to 80% for cycles 5 and 6.  C) bilateral mastectomy on 11/27/18.  Pathology showed a 1.7 cm high grade IDC with superficial invasion into the skeletal muscle.  0/2 SLN involved.  D) PET scan on 1/14/2019 showed a single focus of hypermetabolic activity along the left lateral aspect of the breast and chest wall.  On 2/19/2019 she underwent resection of this lesion which showed residual high-grade invasive ductal carcinoma with tumor extending to the deep margin.  Further resection not possible.  Adjuvant radiotherapy completed 5/10/19.  E) 6/15/2019 she started adjuvant Xeloda.   F) PET scan on 2/19/2019 showed persistent hypermetabolic focus adjacent to the left implant.  On 1/21/2020 she underwent wide excision which showed infiltrating, poorly differentiated ductal adenocarcinoma with positive margins, specimen with extended margins negative  G) PET scan August 25 2020 showed hypermetabolic uptake in chest wall.  On 9/11/20, this was excised, with pathology showing a 2.1 cm poorly differentiated carcinoma, margins negative.  ER weakly positive, DC -, Her2-  H) adjuvant chemotherapy with carboplatin and gemzar started 11/11/20.  Admitted with Covid19 infection and neutropenic fever after cycle 1.  Cycle 2 delayed until 12/30/20.  Completed 4 cycles.  I) chest wall recurrence - resected 7/9/21.  Pathology showed recurrent ER weakly +, DC -, Her2- IDC.  Margins negative. Started on lupron and anastrozole.  2. Left upper extremity DVT, diagnosed March 2021.  Treated with eliquis.      Subjective      Chief Complaint: follow up breast cancer     HISTORY OF PRESENT ILLNESS:   Ina Ram returns for  "follow-up.   She had a ELENA/BSO earlier this month.  She is recovering well from the surgery.    She had scans done earlier today.    Her vision is about the same.  She says she noticed a change around the time she was in her early 40s and this is stable.  She is no longer seeing the wavy lines.    Objective      /60   Pulse 89   Temp 97.3 °F (36.3 °C) (Infrared)   Resp 12   Ht 175.3 cm (69.02\")   Wt 73.5 kg (162 lb)   SpO2 98%   BMI 23.91 kg/m²    Vitals:    11/30/23 1359   PainSc: 0-No pain             Performance Status: 0    General: well appearing female in no acute distress  Neuro: alert and oriented  HEENT: sclera anicteric, oropharynx clear  Lymphatics: no cervical, supraclavicular, or axillary adenopathy  Left chest wall: incision well healed.  No significant erythema or masses  Extremeties: no lower extremity edema  Skin: no rashes, lesions, bruising, or petechiae  Psych: mood and affect appropriate    I have reexamined the patient and the results are consistent with the previously documented exam. Fifi Malloy MD        CT Abdomen Pelvis With Contrast  Narrative: CT CHEST W CONTRAST DIAGNOSTIC, CT ABDOMEN PELVIS W CONTRAST    Date of Exam: 11/30/2023 10:34 AM CST    Indication: recurrent breast cancer.    Comparison: PET/CT 6/8/2023    Technique: Axial CT images were obtained of the chest after the uneventful intravenous administration of 85 mL Isovue-300.  Reconstructed coronal and sagittal images were also obtained. Automated exposure control and iterative construction methods were   used.    Findings:  Hilum and Mediastinum: No pathologically enlarged lymph nodes.  Normal heart size.   No pericardial effusion.  Unremarkable thoracic aorta and pulmonary arteries. Left mastectomy is noted. Right breast implant is present.    Lung Parenchyma and Pleura: No focal consolidations.  No suspicious pulmonary nodules.  No endobronchial lesions.  No significant pleural effusions.    Soft tissues: " Unremarkable.    Osseous structures: No aggressive focal lytic or sclerotic osseous lesions.    LIVER:  Unremarkable parenchyma without focal lesion.  BILIARY/GALLBLADDER:  Unremarkable  SPLEEN:  Unremarkable  PANCREAS:  Unremarkable  ADRENAL:  Unremarkable  KIDNEYS:  Unremarkable parenchyma with no solid mass identified. No obstruction.  No calculus identified.  GASTROINTESTINAL/MESENTERY:  No evidence of obstruction nor inflammation.  The appendix is normal in caliber.  MESENTERIC VESSELS:  Patent.  AORTA/IVC:  Normal caliber.    RETROPERITONEUM/LYMPH NODES:  Unremarkable    REPRODUCTIVE: The uterus is surgically absent.  BLADDER:  Unremarkable    OSSEUS STRUCTURES:  Typical for age with no acute process identified. Intervertebral disc base narrowing is present at L5/S1.  Impression: Impression:  Stable examination of the chest, abdomen and pelvis without evidence for disease recurrence.    Electronically Signed: Angela Gibbons MD    11/30/2023 11:10 AM CST    Workstation ID: [id number]  CT Chest With Contrast Diagnostic  Narrative: CT CHEST W CONTRAST DIAGNOSTIC, CT ABDOMEN PELVIS W CONTRAST    Date of Exam: 11/30/2023 10:34 AM CST    Indication: recurrent breast cancer.    Comparison: PET/CT 6/8/2023    Technique: Axial CT images were obtained of the chest after the uneventful intravenous administration of 85 mL Isovue-300.  Reconstructed coronal and sagittal images were also obtained. Automated exposure control and iterative construction methods were   used.    Findings:  Hilum and Mediastinum: No pathologically enlarged lymph nodes.  Normal heart size.   No pericardial effusion.  Unremarkable thoracic aorta and pulmonary arteries. Left mastectomy is noted. Right breast implant is present.    Lung Parenchyma and Pleura: No focal consolidations.  No suspicious pulmonary nodules.  No endobronchial lesions.  No significant pleural effusions.    Soft tissues: Unremarkable.    Osseous structures: No aggressive focal  lytic or sclerotic osseous lesions.    LIVER:  Unremarkable parenchyma without focal lesion.  BILIARY/GALLBLADDER:  Unremarkable  SPLEEN:  Unremarkable  PANCREAS:  Unremarkable  ADRENAL:  Unremarkable  KIDNEYS:  Unremarkable parenchyma with no solid mass identified. No obstruction.  No calculus identified.  GASTROINTESTINAL/MESENTERY:  No evidence of obstruction nor inflammation.  The appendix is normal in caliber.  MESENTERIC VESSELS:  Patent.  AORTA/IVC:  Normal caliber.    RETROPERITONEUM/LYMPH NODES:  Unremarkable    REPRODUCTIVE: The uterus is surgically absent.  BLADDER:  Unremarkable    OSSEUS STRUCTURES:  Typical for age with no acute process identified. Intervertebral disc base narrowing is present at L5/S1.  Impression: Impression:  Stable examination of the chest, abdomen and pelvis without evidence for disease recurrence.    Electronically Signed: Angela Gibbons MD    11/30/2023 11:10 AM CST    Workstation ID: [id number]    I personally reviewed the imaging studies.      Assessment & Plan   Ina Ram is a 43 y.o. year old female with a stage II B ER weakly positive HER-2 negative breast cancer who returns for follow up.       Her CT scan  shows no evidence of disease recurrence.  We will continue anastrozole, for minimum of 5 years.    We can discontinue Lupron now that she has had her ovaries removed.    Follow-up in 6 months with repeat CT imaging.              Fifi Malloy MD  [facility] Hematology and Oncology    11/30/2023

## 2024-01-15 DIAGNOSIS — Z17.0 MALIGNANT NEOPLASM OF UPPER-OUTER QUADRANT OF LEFT BREAST IN FEMALE, ESTROGEN RECEPTOR POSITIVE: Primary | ICD-10-CM

## 2024-01-15 DIAGNOSIS — C50.412 MALIGNANT NEOPLASM OF UPPER-OUTER QUADRANT OF LEFT BREAST IN FEMALE, ESTROGEN RECEPTOR POSITIVE: Primary | ICD-10-CM

## 2024-01-16 RX ORDER — ANASTROZOLE 1 MG/1
1 TABLET ORAL DAILY
Qty: 30 TABLET | Refills: 11 | Status: SHIPPED | OUTPATIENT
Start: 2024-01-16

## 2024-05-30 ENCOUNTER — LAB (OUTPATIENT)
Dept: LAB | Facility: HOSPITAL | Age: 44
End: 2024-05-30
Payer: MEDICARE

## 2024-05-30 ENCOUNTER — HOSPITAL ENCOUNTER (OUTPATIENT)
Dept: CT IMAGING | Facility: HOSPITAL | Age: 44
Discharge: HOME | End: 2024-05-30
Payer: MEDICARE

## 2024-05-30 ENCOUNTER — OFFICE VISIT (OUTPATIENT)
Dept: ONCOLOGY | Facility: CLINIC | Age: 44
End: 2024-05-30
Payer: MEDICARE

## 2024-05-30 VITALS
BODY MASS INDEX: 25.18 KG/M2 | RESPIRATION RATE: 16 BRPM | HEART RATE: 72 BPM | DIASTOLIC BLOOD PRESSURE: 64 MMHG | SYSTOLIC BLOOD PRESSURE: 130 MMHG | OXYGEN SATURATION: 98 % | HEIGHT: 69 IN | WEIGHT: 170 LBS | TEMPERATURE: 98 F

## 2024-05-30 DIAGNOSIS — Z17.0 MALIGNANT NEOPLASM OF UPPER-OUTER QUADRANT OF LEFT BREAST IN FEMALE, ESTROGEN RECEPTOR POSITIVE: ICD-10-CM

## 2024-05-30 DIAGNOSIS — C50.412 MALIGNANT NEOPLASM OF UPPER-OUTER QUADRANT OF LEFT BREAST IN FEMALE, ESTROGEN RECEPTOR POSITIVE: Primary | ICD-10-CM

## 2024-05-30 DIAGNOSIS — C50.412 MALIGNANT NEOPLASM OF UPPER-OUTER QUADRANT OF LEFT BREAST IN FEMALE, ESTROGEN RECEPTOR POSITIVE: ICD-10-CM

## 2024-05-30 DIAGNOSIS — Z17.0 MALIGNANT NEOPLASM OF UPPER-OUTER QUADRANT OF LEFT BREAST IN FEMALE, ESTROGEN RECEPTOR POSITIVE: Primary | ICD-10-CM

## 2024-05-30 LAB
ALBUMIN SERPL-MCNC: 4.4 G/DL (ref 3.5–5.2)
ALBUMIN/GLOB SERPL: 1.8 G/DL
ALP SERPL-CCNC: 62 U/L (ref 39–117)
ALT SERPL W P-5'-P-CCNC: 19 U/L (ref 1–33)
ANION GAP SERPL CALCULATED.3IONS-SCNC: 12 MMOL/L (ref 5–15)
AST SERPL-CCNC: 19 U/L (ref 1–32)
BASOPHILS # BLD AUTO: 0.02 10*3/MM3 (ref 0–0.2)
BASOPHILS NFR BLD AUTO: 0.5 % (ref 0–1.5)
BILIRUB SERPL-MCNC: 0.3 MG/DL (ref 0–1.2)
BUN SERPL-MCNC: 11 MG/DL (ref 6–20)
BUN/CREAT SERPL: 17.5 (ref 7–25)
CALCIUM SPEC-SCNC: 9.1 MG/DL (ref 8.6–10.5)
CHLORIDE SERPL-SCNC: 105 MMOL/L (ref 98–107)
CO2 SERPL-SCNC: 24 MMOL/L (ref 22–29)
CREAT SERPL-MCNC: 0.63 MG/DL (ref 0.57–1)
DEPRECATED RDW RBC AUTO: 42.7 FL (ref 37–54)
EGFRCR SERPLBLD CKD-EPI 2021: 112.3 ML/MIN/1.73
EOSINOPHIL # BLD AUTO: 0.08 10*3/MM3 (ref 0–0.4)
EOSINOPHIL NFR BLD AUTO: 1.9 % (ref 0.3–6.2)
ERYTHROCYTE [DISTWIDTH] IN BLOOD BY AUTOMATED COUNT: 12.4 % (ref 12.3–15.4)
GLOBULIN UR ELPH-MCNC: 2.5 GM/DL
GLUCOSE SERPL-MCNC: 78 MG/DL (ref 65–99)
HCT VFR BLD AUTO: 36.2 % (ref 34–46.6)
HGB BLD-MCNC: 11.9 G/DL (ref 12–15.9)
IMM GRANULOCYTES # BLD AUTO: 0 10*3/MM3 (ref 0–0.05)
IMM GRANULOCYTES NFR BLD AUTO: 0 % (ref 0–0.5)
LYMPHOCYTES # BLD AUTO: 1.84 10*3/MM3 (ref 0.7–3.1)
LYMPHOCYTES NFR BLD AUTO: 44.1 % (ref 19.6–45.3)
MCH RBC QN AUTO: 31 PG (ref 26.6–33)
MCHC RBC AUTO-ENTMCNC: 32.9 G/DL (ref 31.5–35.7)
MCV RBC AUTO: 94.3 FL (ref 79–97)
MONOCYTES # BLD AUTO: 0.4 10*3/MM3 (ref 0.1–0.9)
MONOCYTES NFR BLD AUTO: 9.6 % (ref 5–12)
NEUTROPHILS NFR BLD AUTO: 1.83 10*3/MM3 (ref 1.7–7)
NEUTROPHILS NFR BLD AUTO: 43.9 % (ref 42.7–76)
NRBC BLD AUTO-RTO: 0 /100 WBC (ref 0–0.2)
PLATELET # BLD AUTO: 151 10*3/MM3 (ref 140–450)
PMV BLD AUTO: 10.1 FL (ref 6–12)
POTASSIUM SERPL-SCNC: 4.1 MMOL/L (ref 3.5–5.2)
PROT SERPL-MCNC: 6.9 G/DL (ref 6–8.5)
RBC # BLD AUTO: 3.84 10*6/MM3 (ref 3.77–5.28)
SODIUM SERPL-SCNC: 141 MMOL/L (ref 136–145)
WBC NRBC COR # BLD AUTO: 4.17 10*3/MM3 (ref 3.4–10.8)

## 2024-05-30 PROCEDURE — 1126F AMNT PAIN NOTED NONE PRSNT: CPT | Performed by: INTERNAL MEDICINE

## 2024-05-30 PROCEDURE — 74177 CT ABD & PELVIS W/CONTRAST: CPT

## 2024-05-30 PROCEDURE — 25510000001 IOPAMIDOL 61 % SOLUTION: Performed by: INTERNAL MEDICINE

## 2024-05-30 PROCEDURE — 71260 CT THORAX DX C+: CPT

## 2024-05-30 PROCEDURE — 85025 COMPLETE CBC W/AUTO DIFF WBC: CPT

## 2024-05-30 PROCEDURE — 80053 COMPREHEN METABOLIC PANEL: CPT

## 2024-05-30 PROCEDURE — 36415 COLL VENOUS BLD VENIPUNCTURE: CPT

## 2024-05-30 PROCEDURE — 99214 OFFICE O/P EST MOD 30 MIN: CPT | Performed by: INTERNAL MEDICINE

## 2024-05-30 RX ADMIN — IOPAMIDOL 100 ML: 612 INJECTION, SOLUTION INTRAVENOUS at 11:57

## 2024-05-30 NOTE — PROGRESS NOTES
PROBLEM LIST:  1. [id number] ER weakly positive (1-3%), FL negative, Her2 negative invasive ductal carcinoma of the left breast  A) presented with a palpable breast mass.  Biopsy showed ER+, FL- Her2- IDC, grade 3.  B) neoadjuvant TAC started July 2018.  Complicated by neutropenic fever after cycle 4.  Dose reduced to 80% for cycles 5 and 6.  C) bilateral mastectomy on 11/27/18.  Pathology showed a 1.7 cm high grade IDC with superficial invasion into the skeletal muscle.  0/2 SLN involved.  D) PET scan on 1/14/2019 showed a single focus of hypermetabolic activity along the left lateral aspect of the breast and chest wall.  On 2/19/2019 she underwent resection of this lesion which showed residual high-grade invasive ductal carcinoma with tumor extending to the deep margin.  Further resection not possible.  Adjuvant radiotherapy completed 5/10/19.  E) 6/15/2019 she started adjuvant Xeloda.   F) PET scan on 2/19/2019 showed persistent hypermetabolic focus adjacent to the left implant.  On 1/21/2020 she underwent wide excision which showed infiltrating, poorly differentiated ductal adenocarcinoma with positive margins, specimen with extended margins negative  G) PET scan August 25 2020 showed hypermetabolic uptake in chest wall.  On 9/11/20, this was excised, with pathology showing a 2.1 cm poorly differentiated carcinoma, margins negative.  ER weakly positive, FL -, Her2-  H) adjuvant chemotherapy with carboplatin and gemzar started 11/11/20.  Admitted with Covid19 infection and neutropenic fever after cycle 1.  Cycle 2 delayed until 12/30/20.  Completed 4 cycles.  I) chest wall recurrence - resected 7/9/21.  Pathology showed recurrent ER weakly +, FL -, Her2- IDC.  Margins negative. Started on lupron and anastrozole.  Lupron stopped after ELENA/BSO November 2023.  2. Left upper extremity DVT, diagnosed March 2021.  Treated with eliquis.      Subjective      Chief Complaint: follow up breast cancer     HISTORY OF  "PRESENT ILLNESS:   Ina Ram returns for follow-up.  She is feeling okay.  She does have some fatigue.  She says she has brain fog.  She has noticed this for a while but she thinks more recently its gotten to the point that other people are noticing it.  Today when she went to her scan she could not remember her address.  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    Objective      /64   Pulse 72   Temp 98 °F (36.7 °C)   Resp 16   Ht 175.3 cm (69.02\")   Wt 77.1 kg (170 lb)   SpO2 98%   BMI 25.09 kg/m²    Vitals:    05/30/24 1355   PainSc: 0-No pain             Performance Status: 0    General: well appearing female in no acute distress  Neuro: alert and oriented  HEENT: sclera anicteric, oropharynx clear  Left chest wall: incision well healed.  No significant erythema or masses  Extremeties: no lower extremity edema  Skin: no rashes, lesions, bruising, or petechiae  Psych: mood and affect appropriate    I have reexamined the patient and the results are consistent with the previously documented exam. Fifi Malloy MD        CT Chest With Contrast Diagnostic  Narrative: CT CHEST W CONTRAST DIAGNOSTIC, CT ABDOMEN PELVIS W CONTRAST    Date of Exam: 5/30/2024 11:33 AM EDT    Indication: breast cancer.    Comparison: 11/30/2023.    Technique: Axial CT images were obtained of the chest, abdomen, and after the uneventful intravenous administration of 100 mL Isovue 300.  Reconstructed coronal and sagittal images were also obtained. Automated exposure control and iterative construction   methods were used.    Findings:  CT chest: There is mild stable scar in the peripheries of the bilateral lower lobes. There are no pulmonary nodules or masses. There are no acute lung infiltrates. There are no enlarged axillary, mediastinal, or hilar nodes. There is a right breast   prosthesis. There is been a left mastectomy. There are no pleural effusions. There are no significant osseous " lesions.    CT abdomen and pelvis: There are no liver lesions. The gallbladder and biliary tree are nondilated. The pancreas, adrenal glands, and spleen appear normal. There are no renal lesions or hydronephrosis. There are no enlarged retroperitoneal or iliac chain   nodes. Partially filled bladder appears normal. The uterus is absent. There is no pelvic mass or free fluid. There is no large or small bowel dilatation. There are no focal inflammatory changes. There is no mesenteric adenopathy. There is a normal   appendix. There is moderate narrowing of the L5-S1 disc with vacuum phenomenon. There are no aggressive osseous lesions.  Impression: Impression:  Stable appearance of the chest, abdomen, and pelvis. No findings suspicious for metastatic disease.    Electronically Signed: Amada Elias MD    5/30/2024 12:34 PM EDT    Workstation ID: [id number]  CT Abdomen Pelvis With Contrast  Narrative: CT CHEST W CONTRAST DIAGNOSTIC, CT ABDOMEN PELVIS W CONTRAST    Date of Exam: 5/30/2024 11:33 AM EDT    Indication: breast cancer.    Comparison: 11/30/2023.    Technique: Axial CT images were obtained of the chest, abdomen, and after the uneventful intravenous administration of 100 mL Isovue 300.  Reconstructed coronal and sagittal images were also obtained. Automated exposure control and iterative construction   methods were used.    Findings:  CT chest: There is mild stable scar in the peripheries of the bilateral lower lobes. There are no pulmonary nodules or masses. There are no acute lung infiltrates. There are no enlarged axillary, mediastinal, or hilar nodes. There is a right breast   prosthesis. There is been a left mastectomy. There are no pleural effusions. There are no significant osseous lesions.    CT abdomen and pelvis: There are no liver lesions. The gallbladder and biliary tree are nondilated. The pancreas, adrenal glands, and spleen appear normal. There are no renal lesions or hydronephrosis. There are no  enlarged retroperitoneal or iliac chain   nodes. Partially filled bladder appears normal. The uterus is absent. There is no pelvic mass or free fluid. There is no large or small bowel dilatation. There are no focal inflammatory changes. There is no mesenteric adenopathy. There is a normal   appendix. There is moderate narrowing of the L5-S1 disc with vacuum phenomenon. There are no aggressive osseous lesions.  Impression: Impression:  Stable appearance of the chest, abdomen, and pelvis. No findings suspicious for metastatic disease.    Electronically Signed: Amada Elias MD    5/30/2024 12:34 PM EDT    Workstation ID: [id number]    I personally reviewed the imaging studies.      Assessment & Plan   Ina Ram is a 44 y.o. year old female with a stage II B ER weakly positive HER-2 negative breast cancer who returns for follow up.       Her CT scan  shows no evidence of disease recurrence.  We will continue anastrozole, for a minimum of 5 years.    Cognitive changes: We discussed that this can be related to hormonal changes, or postchemotherapy.  Discussed the importance of getting good sleep, regular exercise.  She denies any feelings of depression.  This hopefully will improve with time.    Follow-up in 6 months with repeat CT imaging.  We will continue scans every 6 months until 5 years from her last surgery.              Fifi Malloy MD  [facility] Hematology and Oncology    5/30/2024

## 2024-10-22 ENCOUNTER — TELEPHONE (OUTPATIENT)
Dept: ONCOLOGY | Facility: CLINIC | Age: 44
End: 2024-10-22
Payer: MEDICARE

## 2024-10-22 DIAGNOSIS — C50.412 MALIGNANT NEOPLASM OF UPPER-OUTER QUADRANT OF LEFT BREAST IN FEMALE, ESTROGEN RECEPTOR POSITIVE: Primary | ICD-10-CM

## 2024-10-22 DIAGNOSIS — Z17.0 MALIGNANT NEOPLASM OF UPPER-OUTER QUADRANT OF LEFT BREAST IN FEMALE, ESTROGEN RECEPTOR POSITIVE: Primary | ICD-10-CM

## 2024-10-22 DIAGNOSIS — Z85.3 PERSONAL HISTORY OF BREAST CANCER: ICD-10-CM

## 2024-10-22 NOTE — TELEPHONE ENCOUNTER
Caller: KODI    Relationship: Other    Best call back number: [phone]    What is the best time to reach you: ANYTIME    Who are you requesting to speak with (clinical staff, provider,  specific staff member): CLINICAL    What was the call regarding: KODI NEEDS A SCRIPT FOR ONE SILICONE BREAST PROSTHESIS AND 3 MASTECTOMY BRAS. USE CODE Z85.3 WITH TODAY'S DATE 10/22/24.     PLEASE FAX [phone].

## 2024-11-21 ENCOUNTER — HOSPITAL ENCOUNTER (OUTPATIENT)
Dept: CT IMAGING | Facility: HOSPITAL | Age: 44
Discharge: HOME | End: 2024-11-21
Payer: MEDICARE

## 2024-11-21 ENCOUNTER — OFFICE VISIT (OUTPATIENT)
Dept: ONCOLOGY | Facility: CLINIC | Age: 44
End: 2024-11-21
Payer: MEDICARE

## 2024-11-21 ENCOUNTER — LAB (OUTPATIENT)
Dept: LAB | Facility: HOSPITAL | Age: 44
End: 2024-11-21
Payer: MEDICARE

## 2024-11-21 VITALS
SYSTOLIC BLOOD PRESSURE: 119 MMHG | HEART RATE: 74 BPM | TEMPERATURE: 97.3 F | WEIGHT: 172.6 LBS | RESPIRATION RATE: 18 BRPM | OXYGEN SATURATION: 99 % | DIASTOLIC BLOOD PRESSURE: 81 MMHG | BODY MASS INDEX: 25.48 KG/M2

## 2024-11-21 DIAGNOSIS — Z17.0 MALIGNANT NEOPLASM OF UPPER-OUTER QUADRANT OF LEFT BREAST IN FEMALE, ESTROGEN RECEPTOR POSITIVE: Primary | ICD-10-CM

## 2024-11-21 DIAGNOSIS — C50.412 MALIGNANT NEOPLASM OF UPPER-OUTER QUADRANT OF LEFT BREAST IN FEMALE, ESTROGEN RECEPTOR POSITIVE: Primary | ICD-10-CM

## 2024-11-21 DIAGNOSIS — C50.412 MALIGNANT NEOPLASM OF UPPER-OUTER QUADRANT OF LEFT BREAST IN FEMALE, ESTROGEN RECEPTOR POSITIVE: ICD-10-CM

## 2024-11-21 DIAGNOSIS — Z17.0 MALIGNANT NEOPLASM OF UPPER-OUTER QUADRANT OF LEFT BREAST IN FEMALE, ESTROGEN RECEPTOR POSITIVE: ICD-10-CM

## 2024-11-21 LAB
ALBUMIN SERPL-MCNC: 4.5 G/DL (ref 3.5–5.2)
ALBUMIN/GLOB SERPL: 1.5 G/DL
ALP SERPL-CCNC: 76 U/L (ref 39–117)
ALT SERPL W P-5'-P-CCNC: 19 U/L (ref 1–33)
ANION GAP SERPL CALCULATED.3IONS-SCNC: 14 MMOL/L (ref 5–15)
AST SERPL-CCNC: 17 U/L (ref 1–32)
BASOPHILS # BLD AUTO: 0.02 10*3/MM3 (ref 0–0.2)
BASOPHILS NFR BLD AUTO: 0.4 % (ref 0–1.5)
BILIRUB SERPL-MCNC: 0.3 MG/DL (ref 0–1.2)
BUN SERPL-MCNC: 12 MG/DL (ref 6–20)
BUN/CREAT SERPL: 16 (ref 7–25)
CALCIUM SPEC-SCNC: 9.8 MG/DL (ref 8.6–10.5)
CHLORIDE SERPL-SCNC: 101 MMOL/L (ref 98–107)
CO2 SERPL-SCNC: 20 MMOL/L (ref 22–29)
CREAT SERPL-MCNC: 0.75 MG/DL (ref 0.57–1)
DEPRECATED RDW RBC AUTO: 41 FL (ref 37–54)
EGFRCR SERPLBLD CKD-EPI 2021: 100.8 ML/MIN/1.73
EOSINOPHIL # BLD AUTO: 0.15 10*3/MM3 (ref 0–0.4)
EOSINOPHIL NFR BLD AUTO: 2.8 % (ref 0.3–6.2)
ERYTHROCYTE [DISTWIDTH] IN BLOOD BY AUTOMATED COUNT: 12.1 % (ref 12.3–15.4)
GLOBULIN UR ELPH-MCNC: 3 GM/DL
GLUCOSE SERPL-MCNC: 84 MG/DL (ref 65–99)
HCT VFR BLD AUTO: 38.5 % (ref 34–46.6)
HGB BLD-MCNC: 13.1 G/DL (ref 12–15.9)
IMM GRANULOCYTES # BLD AUTO: 0.01 10*3/MM3 (ref 0–0.05)
IMM GRANULOCYTES NFR BLD AUTO: 0.2 % (ref 0–0.5)
LYMPHOCYTES # BLD AUTO: 1.86 10*3/MM3 (ref 0.7–3.1)
LYMPHOCYTES NFR BLD AUTO: 34.4 % (ref 19.6–45.3)
MCH RBC QN AUTO: 31 PG (ref 26.6–33)
MCHC RBC AUTO-ENTMCNC: 34 G/DL (ref 31.5–35.7)
MCV RBC AUTO: 91 FL (ref 79–97)
MONOCYTES # BLD AUTO: 0.34 10*3/MM3 (ref 0.1–0.9)
MONOCYTES NFR BLD AUTO: 6.3 % (ref 5–12)
NEUTROPHILS NFR BLD AUTO: 3.03 10*3/MM3 (ref 1.7–7)
NEUTROPHILS NFR BLD AUTO: 55.9 % (ref 42.7–76)
PLATELET # BLD AUTO: 163 10*3/MM3 (ref 140–450)
PMV BLD AUTO: 9.3 FL (ref 6–12)
POTASSIUM SERPL-SCNC: 4.1 MMOL/L (ref 3.5–5.2)
PROT SERPL-MCNC: 7.5 G/DL (ref 6–8.5)
RBC # BLD AUTO: 4.23 10*6/MM3 (ref 3.77–5.28)
SODIUM SERPL-SCNC: 135 MMOL/L (ref 136–145)
WBC NRBC COR # BLD AUTO: 5.41 10*3/MM3 (ref 3.4–10.8)

## 2024-11-21 PROCEDURE — 80053 COMPREHEN METABOLIC PANEL: CPT

## 2024-11-21 PROCEDURE — 71260 CT THORAX DX C+: CPT

## 2024-11-21 PROCEDURE — 1126F AMNT PAIN NOTED NONE PRSNT: CPT | Performed by: INTERNAL MEDICINE

## 2024-11-21 PROCEDURE — 74177 CT ABD & PELVIS W/CONTRAST: CPT

## 2024-11-21 PROCEDURE — 99214 OFFICE O/P EST MOD 30 MIN: CPT | Performed by: INTERNAL MEDICINE

## 2024-11-21 PROCEDURE — 85025 COMPLETE CBC W/AUTO DIFF WBC: CPT

## 2024-11-21 PROCEDURE — 36415 COLL VENOUS BLD VENIPUNCTURE: CPT

## 2024-11-21 PROCEDURE — 25510000001 IOPAMIDOL 61 % SOLUTION: Performed by: INTERNAL MEDICINE

## 2024-11-21 RX ORDER — ERGOCALCIFEROL 1.25 MG/1
CAPSULE, LIQUID FILLED ORAL
COMMUNITY
Start: 2024-10-28

## 2024-11-21 RX ORDER — FLUCONAZOLE 200 MG/1
1 TABLET ORAL DAILY
COMMUNITY
Start: 2024-11-16

## 2024-11-21 RX ORDER — CIPROFLOXACIN 500 MG/1
1 TABLET, FILM COATED ORAL EVERY 12 HOURS SCHEDULED
COMMUNITY
Start: 2024-10-28

## 2024-11-21 RX ORDER — IOPAMIDOL 612 MG/ML
100 INJECTION, SOLUTION INTRAVASCULAR
Status: COMPLETED | OUTPATIENT
Start: 2024-11-21 | End: 2024-11-21

## 2024-11-21 RX ORDER — AMOXICILLIN 500 MG/1
CAPSULE ORAL
COMMUNITY
Start: 2024-09-04

## 2024-11-21 RX ORDER — NITROFURANTOIN 25; 75 MG/1; MG/1
1 CAPSULE ORAL EVERY 12 HOURS SCHEDULED
COMMUNITY
Start: 2024-10-31

## 2024-11-21 RX ADMIN — IOPAMIDOL 90 ML: 612 INJECTION, SOLUTION INTRAVENOUS at 12:16

## 2024-11-21 NOTE — PROGRESS NOTES
PROBLEM LIST:  1. [id number] ER weakly positive (1-3%), [place] negative, Her2 negative invasive ductal carcinoma of the left breast  A) presented with a palpable breast mass.  Biopsy showed ER+, [place]- Her2- IDC, grade 3.  B) neoadjuvant TAC started July 2018.  Complicated by neutropenic fever after cycle 4.  Dose reduced to 80% for cycles 5 and 6.  C) bilateral mastectomy on 11/27/18.  Pathology showed a 1.7 cm high grade IDC with superficial invasion into the skeletal muscle.  0/2 SLN involved.  D) PET scan on 1/14/2019 showed a single focus of hypermetabolic activity along the left lateral aspect of the breast and chest wall.  On 2/19/2019 she underwent resection of this lesion which showed residual high-grade invasive ductal carcinoma with tumor extending to the deep margin.  Further resection not possible.  Adjuvant radiotherapy completed 5/10/19.  E) 6/15/2019 she started adjuvant Xeloda.   F) PET scan on 2/19/2019 showed persistent hypermetabolic focus adjacent to the left implant.  On 1/21/2020 she underwent wide excision which showed infiltrating, poorly differentiated ductal adenocarcinoma with positive margins, specimen with extended margins negative  G) PET scan August 25 2020 showed hypermetabolic uptake in chest wall.  On 9/11/20, this was excised, with pathology showing a 2.1 cm poorly differentiated carcinoma, margins negative.  ER weakly positive, [place] -, Her2-  H) adjuvant chemotherapy with carboplatin and gemzar started 11/11/20.  Admitted with Covid19 infection and neutropenic fever after cycle 1.  Cycle 2 delayed until 12/30/20.  Completed 4 cycles.  I) chest wall recurrence - resected 7/9/21.  Pathology showed recurrent ER weakly +, [place] -, Her2- IDC.  Margins negative. Started on lupron and anastrozole.  Lupron stopped after ELENA/BSO November 2023.  2. Left upper extremity DVT, diagnosed March 2021.  Treated with eliquis.      Subjective      Chief Complaint: follow up breast cancer     HISTORY OF  PRESENT ILLNESS:   Ina Ram returns for follow-up.  She is feeling okay.  She has had a few bladder infections recently.  He currently is on antibiotics for this.  She currently is on antibiotics for this.  She says she does not really notice vaginal dryness otherwise.    Objective      /81   Pulse 74   Temp 97.3 °F (36.3 °C) (Temporal)   Resp 18   Wt 78.3 kg (172 lb 9.6 oz)   SpO2 99%   BMI 25.48 kg/m²    There were no vitals filed for this visit.            Performance Status: 0    General: well appearing female in no acute distress  Neuro: alert and oriented  HEENT: sclera anicteric, oropharynx clear  Left chest wall: incision well healed.  No significant erythema or masses  Extremeties: no lower extremity edema  Skin: no rashes, lesions, bruising, or petechiae  Psych: mood and affect appropriate    I have reexamined the patient and the results are consistent with the previously documented exam. Fifi Malloy MD        CT Abdomen Pelvis With Contrast  Narrative: CT CHEST W CONTRAST DIAGNOSTIC, CT ABDOMEN PELVIS W CONTRAST    Date of Exam: 11/21/2024 11:34 AM EST    Indication: breast cancer.    Comparison: 5/30/2024    Technique: Axial CT images were obtained of the chest abdomen and pelvis after the uneventful intravenous administration of 90 cc Isovue-300.  Reconstructed coronal and sagittal images were also obtained. Automated exposure control and iterative   construction methods were used.    Findings:  CT CHEST:  MEDIASTINUM: Unremarkable. Aortic and heart size are normal. No mass nor pericardial effusion.  CORONARY ARTERIES: There is calcified atherosclerotic disease.  LUNGS: Lungs are clear. No consolidation. No significant nodule nor interstitial changes.  PLEURAL SPACE: No effusion, mass, nor pneumothorax.  LYMPH NODES: No pathologically enlarged thoracic nodes.    Bilateral mastectomies with right breast reconstruction are similar.    CT ABDOMEN AND PELVIS:   LIVER:  Unremarkable  parenchyma without focal lesion.  BILIARY/GALLBLADDER:  Unremarkable  SPLEEN:  Unremarkable  PANCREAS:  Unremarkable  ADRENAL:  Unremarkable  KIDNEYS:  Unremarkable parenchyma with no solid mass identified. No obstruction.  No calculus identified.  GASTROINTESTINAL/MESENTERY:  No evidence of obstruction nor inflammation.  AORTA/IVC:  Normal caliber.    RETROPERITONEUM/LYMPH NODES:  Unremarkable    REPRODUCTIVE: Hysterectomy  BLADDER:  Unremarkable    OSSEUS STRUCTURES:  Typical for age with no acute process identified.  Impression: Impression:  1.Stable exam. No evidence of metastatic disease within the chest, abdomen, or pelvis.    Electronically Signed: Mahin Gibbons MD    11/21/2024 12:30 PM EST    Workstation ID: [id number]  CT Chest With Contrast Diagnostic  Narrative: CT CHEST W CONTRAST DIAGNOSTIC, CT ABDOMEN PELVIS W CONTRAST    Date of Exam: 11/21/2024 11:34 AM EST    Indication: breast cancer.    Comparison: 5/30/2024    Technique: Axial CT images were obtained of the chest abdomen and pelvis after the uneventful intravenous administration of 90 cc Isovue-300.  Reconstructed coronal and sagittal images were also obtained. Automated exposure control and iterative   construction methods were used.    Findings:  CT CHEST:  MEDIASTINUM: Unremarkable. Aortic and heart size are normal. No mass nor pericardial effusion.  CORONARY ARTERIES: There is calcified atherosclerotic disease.  LUNGS: Lungs are clear. No consolidation. No significant nodule nor interstitial changes.  PLEURAL SPACE: No effusion, mass, nor pneumothorax.  LYMPH NODES: No pathologically enlarged thoracic nodes.    Bilateral mastectomies with right breast reconstruction are similar.    CT ABDOMEN AND PELVIS:   LIVER:  Unremarkable parenchyma without focal lesion.  BILIARY/GALLBLADDER:  Unremarkable  SPLEEN:  Unremarkable  PANCREAS:  Unremarkable  ADRENAL:  Unremarkable  KIDNEYS:  Unremarkable parenchyma with no solid mass identified. No  obstruction.  No calculus identified.  GASTROINTESTINAL/MESENTERY:  No evidence of obstruction nor inflammation.  AORTA/IVC:  Normal caliber.    RETROPERITONEUM/LYMPH NODES:  Unremarkable    REPRODUCTIVE: Hysterectomy  BLADDER:  Unremarkable    OSSEUS STRUCTURES:  Typical for age with no acute process identified.  Impression: Impression:  1.Stable exam. No evidence of metastatic disease within the chest, abdomen, or pelvis.    Electronically Signed: Mahin Gibbons MD    11/21/2024 12:30 PM EST    Workstation ID: [id number]    I personally reviewed the imaging studies.      Assessment & Plan   Ina Ram is a 44 y.o. year old female with a stage II B ER weakly positive HER-2 negative breast cancer who returns for follow up.       Her CT scan  shows no evidence of disease recurrence.  We will continue anastrozole for a minimum of 5 years.    Recurrent UTI: We discussed that there can be an increased risk for UTI with vaginal dryness related to menopause and aromatase inhibitor treatment.  We discussed the option of vaginal estrogen.  She does not want to do this currently but may consider it if the urinary tract infections continue.    Follow-up in 6 months with repeat CT imaging.  We will continue scans every 6 months until 5 years from her last surgery.              Fifi Malloy MD  [facility] Hematology and Oncology    11/21/2024

## 2025-04-17 DIAGNOSIS — C50.412 MALIGNANT NEOPLASM OF UPPER-OUTER QUADRANT OF LEFT BREAST IN FEMALE, ESTROGEN RECEPTOR POSITIVE: ICD-10-CM

## 2025-04-17 DIAGNOSIS — Z17.0 MALIGNANT NEOPLASM OF UPPER-OUTER QUADRANT OF LEFT BREAST IN FEMALE, ESTROGEN RECEPTOR POSITIVE: ICD-10-CM

## 2025-04-21 ENCOUNTER — TELEPHONE (OUTPATIENT)
Dept: ONCOLOGY | Facility: CLINIC | Age: 45
End: 2025-04-21
Payer: MEDICARE

## 2025-04-21 RX ORDER — ANASTROZOLE 1 MG/1
1 TABLET ORAL DAILY
Qty: 30 TABLET | Refills: 0 | Status: SHIPPED | OUTPATIENT
Start: 2025-04-21

## 2025-04-21 NOTE — TELEPHONE ENCOUNTER
Caller: Ina Ram    Relationship: Self    Best call back number:   Telephone Information:   Mobile [phone]     What was the call regarding: NEED TO R/S 5/22 CT SCAN & F/U 2    NEEDS TO BE THE SAME DAY, SCHEDULE APPTS AFTER 5/26    CALL WITH APPT DETAILS

## 2025-06-05 ENCOUNTER — OFFICE VISIT (OUTPATIENT)
Dept: ONCOLOGY | Facility: CLINIC | Age: 45
End: 2025-06-05
Payer: MEDICARE

## 2025-06-05 ENCOUNTER — HOSPITAL ENCOUNTER (OUTPATIENT)
Dept: CT IMAGING | Facility: HOSPITAL | Age: 45
Discharge: HOME | End: 2025-06-05
Payer: MEDICARE

## 2025-06-05 ENCOUNTER — LAB (OUTPATIENT)
Dept: LAB | Facility: HOSPITAL | Age: 45
End: 2025-06-05
Payer: MEDICARE

## 2025-06-05 ENCOUNTER — TELEPHONE (OUTPATIENT)
Dept: ONCOLOGY | Facility: CLINIC | Age: 45
End: 2025-06-05

## 2025-06-05 VITALS
WEIGHT: 177 LBS | SYSTOLIC BLOOD PRESSURE: 118 MMHG | BODY MASS INDEX: 26.22 KG/M2 | DIASTOLIC BLOOD PRESSURE: 74 MMHG | TEMPERATURE: 97.1 F | HEIGHT: 69 IN | HEART RATE: 76 BPM | OXYGEN SATURATION: 99 %

## 2025-06-05 DIAGNOSIS — C50.412 MALIGNANT NEOPLASM OF UPPER-OUTER QUADRANT OF LEFT BREAST IN FEMALE, ESTROGEN RECEPTOR POSITIVE: Primary | ICD-10-CM

## 2025-06-05 DIAGNOSIS — Z17.0 MALIGNANT NEOPLASM OF UPPER-OUTER QUADRANT OF LEFT BREAST IN FEMALE, ESTROGEN RECEPTOR POSITIVE: Primary | ICD-10-CM

## 2025-06-05 DIAGNOSIS — Z17.0 MALIGNANT NEOPLASM OF UPPER-OUTER QUADRANT OF LEFT BREAST IN FEMALE, ESTROGEN RECEPTOR POSITIVE: ICD-10-CM

## 2025-06-05 DIAGNOSIS — C50.412 MALIGNANT NEOPLASM OF UPPER-OUTER QUADRANT OF LEFT BREAST IN FEMALE, ESTROGEN RECEPTOR POSITIVE: ICD-10-CM

## 2025-06-05 LAB
ALBUMIN SERPL-MCNC: 4.5 G/DL (ref 3.5–5.2)
ALBUMIN/GLOB SERPL: 2 G/DL
ALP SERPL-CCNC: 73 U/L (ref 39–117)
ALT SERPL W P-5'-P-CCNC: 24 U/L (ref 1–33)
ANION GAP SERPL CALCULATED.3IONS-SCNC: 11 MMOL/L (ref 5–15)
AST SERPL-CCNC: 21 U/L (ref 1–32)
BASOPHILS # BLD AUTO: 0.04 10*3/MM3 (ref 0–0.2)
BASOPHILS NFR BLD AUTO: 0.6 % (ref 0–1.5)
BILIRUB SERPL-MCNC: 0.3 MG/DL (ref 0–1.2)
BUN SERPL-MCNC: 10.4 MG/DL (ref 6–20)
BUN/CREAT SERPL: 14.2 (ref 7–25)
CALCIUM SPEC-SCNC: 9.5 MG/DL (ref 8.6–10.5)
CHLORIDE SERPL-SCNC: 105 MMOL/L (ref 98–107)
CO2 SERPL-SCNC: 25 MMOL/L (ref 22–29)
CREAT SERPL-MCNC: 0.73 MG/DL (ref 0.57–1)
DEPRECATED RDW RBC AUTO: 41.6 FL (ref 37–54)
EGFRCR SERPLBLD CKD-EPI 2021: 103.5 ML/MIN/1.73
EOSINOPHIL # BLD AUTO: 0.14 10*3/MM3 (ref 0–0.4)
EOSINOPHIL NFR BLD AUTO: 2.2 % (ref 0.3–6.2)
ERYTHROCYTE [DISTWIDTH] IN BLOOD BY AUTOMATED COUNT: 12.6 % (ref 12.3–15.4)
GLOBULIN UR ELPH-MCNC: 2.3 GM/DL
GLUCOSE SERPL-MCNC: 76 MG/DL (ref 65–99)
HCT VFR BLD AUTO: 35.9 % (ref 34–46.6)
HGB BLD-MCNC: 12.1 G/DL (ref 12–15.9)
IMM GRANULOCYTES # BLD AUTO: 0.02 10*3/MM3 (ref 0–0.05)
IMM GRANULOCYTES NFR BLD AUTO: 0.3 % (ref 0–0.5)
LYMPHOCYTES # BLD AUTO: 2.4 10*3/MM3 (ref 0.7–3.1)
LYMPHOCYTES NFR BLD AUTO: 38.2 % (ref 19.6–45.3)
MCH RBC QN AUTO: 30.9 PG (ref 26.6–33)
MCHC RBC AUTO-ENTMCNC: 33.7 G/DL (ref 31.5–35.7)
MCV RBC AUTO: 91.6 FL (ref 79–97)
MONOCYTES # BLD AUTO: 0.65 10*3/MM3 (ref 0.1–0.9)
MONOCYTES NFR BLD AUTO: 10.3 % (ref 5–12)
NEUTROPHILS NFR BLD AUTO: 3.04 10*3/MM3 (ref 1.7–7)
NEUTROPHILS NFR BLD AUTO: 48.4 % (ref 42.7–76)
NRBC BLD AUTO-RTO: 0 /100 WBC (ref 0–0.2)
PLATELET # BLD AUTO: 202 10*3/MM3 (ref 140–450)
PMV BLD AUTO: 10 FL (ref 6–12)
POTASSIUM SERPL-SCNC: 3.9 MMOL/L (ref 3.5–5.2)
PROT SERPL-MCNC: 6.8 G/DL (ref 6–8.5)
RBC # BLD AUTO: 3.92 10*6/MM3 (ref 3.77–5.28)
SODIUM SERPL-SCNC: 141 MMOL/L (ref 136–145)
WBC NRBC COR # BLD AUTO: 6.29 10*3/MM3 (ref 3.4–10.8)

## 2025-06-05 PROCEDURE — 1126F AMNT PAIN NOTED NONE PRSNT: CPT | Performed by: INTERNAL MEDICINE

## 2025-06-05 PROCEDURE — 85025 COMPLETE CBC W/AUTO DIFF WBC: CPT

## 2025-06-05 PROCEDURE — 36415 COLL VENOUS BLD VENIPUNCTURE: CPT

## 2025-06-05 PROCEDURE — 80053 COMPREHEN METABOLIC PANEL: CPT

## 2025-06-05 PROCEDURE — 99214 OFFICE O/P EST MOD 30 MIN: CPT | Performed by: INTERNAL MEDICINE

## 2025-06-05 PROCEDURE — 71260 CT THORAX DX C+: CPT

## 2025-06-05 PROCEDURE — 25510000001 IOPAMIDOL 61 % SOLUTION: Performed by: INTERNAL MEDICINE

## 2025-06-05 PROCEDURE — 74177 CT ABD & PELVIS W/CONTRAST: CPT

## 2025-06-05 RX ORDER — IOPAMIDOL 612 MG/ML
100 INJECTION, SOLUTION INTRAVASCULAR
Status: COMPLETED | OUTPATIENT
Start: 2025-06-05 | End: 2025-06-05

## 2025-06-05 RX ADMIN — IOPAMIDOL 85 ML: 612 INJECTION, SOLUTION INTRAVENOUS at 13:34

## 2025-06-05 NOTE — PROGRESS NOTES
PROBLEM LIST:  1. [id number] ER weakly positive (1-3%), [place] negative, Her2 negative invasive ductal carcinoma of the left breast  A) presented with a palpable breast mass.  Biopsy showed ER+, [place]- Her2- IDC, grade 3.  B) neoadjuvant TAC started July 2018.  Complicated by neutropenic fever after cycle 4.  Dose reduced to 80% for cycles 5 and 6.  C) bilateral mastectomy on 11/27/18.  Pathology showed a 1.7 cm high grade IDC with superficial invasion into the skeletal muscle.  0/2 SLN involved.  D) PET scan on 1/14/2019 showed a single focus of hypermetabolic activity along the left lateral aspect of the breast and chest wall.  On 2/19/2019 she underwent resection of this lesion which showed residual high-grade invasive ductal carcinoma with tumor extending to the deep margin.  Further resection not possible.  Adjuvant radiotherapy completed 5/10/19.  E) 6/15/2019 she started adjuvant Xeloda.   F) PET scan on 2/19/2019 showed persistent hypermetabolic focus adjacent to the left implant.  On 1/21/2020 she underwent wide excision which showed infiltrating, poorly differentiated ductal adenocarcinoma with positive margins, specimen with extended margins negative  G) PET scan August 25 2020 showed hypermetabolic uptake in chest wall.  On 9/11/20, this was excised, with pathology showing a 2.1 cm poorly differentiated carcinoma, margins negative.  ER weakly positive, [place] -, Her2-  H) adjuvant chemotherapy with carboplatin and gemzar started 11/11/20.  Admitted with Covid19 infection and neutropenic fever after cycle 1.  Cycle 2 delayed until 12/30/20.  Completed 4 cycles.  I) chest wall recurrence - resected 7/9/21.  Pathology showed recurrent ER weakly +, [place] -, Her2- IDC.  Margins negative. Started on lupron and anastrozole.  Lupron stopped after ELENA/BSO November 2023.  2. Left upper extremity DVT, diagnosed March 2021.  Treated with eliquis.      Subjective      Chief Complaint: follow up breast cancer     HISTORY OF  "PRESENT ILLNESS:   Ina Ram returns for follow-up.  She has been feeling okay.  No UTIs since December.      Objective      /74   Pulse 76   Temp 97.1 °F (36.2 °C) (Infrared)   Ht 175.3 cm (69.02\")   Wt 80.3 kg (177 lb)   SpO2 99%   BMI 26.13 kg/m²    Vitals:    06/05/25 1419   PainSc: 0-No pain               Performance Status: 0    General: well appearing female in no acute distress  Neuro: alert and oriented  HEENT: sclera anicteric, oropharynx clear  Left chest wall: incision well healed.  No significant erythema or masses  Extremeties: no lower extremity edema  Skin: no rashes, lesions, bruising, or petechiae  Psych: mood and affect appropriate    I have reexamined the patient and the results are consistent with the previously documented exam. Fifi Malloy MD        CT Abdomen Pelvis With Contrast  Narrative: CT CHEST W CONTRAST DIAGNOSTIC, CT ABDOMEN PELVIS W CONTRAST    Date of Exam: 6/5/2025 12:54 PM EDT    Indication: breast cancer.    Comparison: 11/21/2024 CT    Technique: Axial CT images were obtained of the chest, abdomen, and pelvis after the uneventful intravenous administration of 85 and Isovue-300.  Reconstructed coronal and sagittal images were also obtained. Automated exposure control and iterative   construction methods were used.    Findings:  CT chest:  The central airways are patent.  There is no significant bronchial wall thickening or bronchiectasis. Stable mild fibrosis in the left lung apex. Hypoventilatory changes. No focal airspace consolidation.  No pleural effusion or pneumothorax.    No suspicious pulmonary nodule or mass.    No mediastinal mass or suspicious lymphadenopathy.  Heart is normal in size.  No pericardial effusion. No central pulmonary embolism. Minimal coronary artery calcification.    Chest wall soft tissues show no acute abnormality. Surgical changes of mastectomy with right breast prosthesis..  No fracture or suspicious osseous lesion.    CT " abdomen/pelvis:  There is no suspicious hepatic lesion.    Gallbladder is unremarkable.    The spleen, pancreas, and bilateral adrenal glands are unremarkable.    There is no hydronephrosis or nephrolithiasis. No suspicious renal lesion.    There is no evidence of bowel obstruction. Normal appendix.    No suspicious lymphadenopathy. No abdominal aortic aneurysm.    Urinary bladder is unremarkable. Hysterectomy.    Abdominal and pelvic wall soft tissues show no acute abnormality. There is no fracture or suspicious osseous lesion.     Impression: Impression:  No evidence of metastatic disease in the chest, abdomen, or pelvis.    Electronically Signed: Grant Villatoro MD    6/5/2025 2:13 PM EDT    Workstation ID: [id number]  CT Chest With Contrast Diagnostic  Narrative: CT CHEST W CONTRAST DIAGNOSTIC, CT ABDOMEN PELVIS W CONTRAST    Date of Exam: 6/5/2025 12:54 PM EDT    Indication: breast cancer.    Comparison: 11/21/2024 CT    Technique: Axial CT images were obtained of the chest, abdomen, and pelvis after the uneventful intravenous administration of 85 and Isovue-300.  Reconstructed coronal and sagittal images were also obtained. Automated exposure control and iterative   construction methods were used.    Findings:  CT chest:  The central airways are patent.  There is no significant bronchial wall thickening or bronchiectasis. Stable mild fibrosis in the left lung apex. Hypoventilatory changes. No focal airspace consolidation.  No pleural effusion or pneumothorax.    No suspicious pulmonary nodule or mass.    No mediastinal mass or suspicious lymphadenopathy.  Heart is normal in size.  No pericardial effusion. No central pulmonary embolism. Minimal coronary artery calcification.    Chest wall soft tissues show no acute abnormality. Surgical changes of mastectomy with right breast prosthesis..  No fracture or suspicious osseous lesion.    CT abdomen/pelvis:  There is no suspicious hepatic lesion.    Gallbladder is  unremarkable.    The spleen, pancreas, and bilateral adrenal glands are unremarkable.    There is no hydronephrosis or nephrolithiasis. No suspicious renal lesion.    There is no evidence of bowel obstruction. Normal appendix.    No suspicious lymphadenopathy. No abdominal aortic aneurysm.    Urinary bladder is unremarkable. Hysterectomy.    Abdominal and pelvic wall soft tissues show no acute abnormality. There is no fracture or suspicious osseous lesion.     Impression: Impression:  No evidence of metastatic disease in the chest, abdomen, or pelvis.    Electronically Signed: Grant Villatoro MD    6/5/2025 2:13 PM EDT    Workstation ID: [id number]    I personally reviewed the imaging studies.      Assessment & Plan   Ina Ram is a 45 y.o. year old female with a stage II B ER weakly positive HER-2 negative breast cancer who returns for follow up.       Her CT scan continues to show no evidence of disease recurrence.  We will plan to continue anastrozole for total of 5 years.  We discussed that she is now 4 years out from her last surgery.  I think her likelihood of recurrence in the future is low.  We will continue to monitor with scans until 5 years.    Follow-up in 6 months with repeat CT imaging.  We will continue scans every 6 months until 5 years from her last surgery, which will be summer 2026.              Fifi Malloy MD  [facility] Hematology and Oncology    6/5/2025

## 2025-06-05 NOTE — PROGRESS NOTES
PROBLEM LIST:  1. [id number] ER weakly positive (1-3%), MO negative, Her2 negative invasive ductal carcinoma of the left breast  A) presented with a palpable breast mass.  Biopsy showed ER+, MO- Her2- IDC, grade 3.  B) neoadjuvant TAC started July 2018.  Complicated by neutropenic fever after cycle 4.  Dose reduced to 80% for cycles 5 and 6.  C) bilateral mastectomy on 11/27/18.  Pathology showed a 1.7 cm high grade IDC with superficial invasion into the skeletal muscle.  0/2 SLN involved.  D) PET scan on 1/14/2019 showed a single focus of hypermetabolic activity along the left lateral aspect of the breast and chest wall.  On 2/19/2019 she underwent resection of this lesion which showed residual high-grade invasive ductal carcinoma with tumor extending to the deep margin.  Further resection not possible.  Adjuvant radiotherapy completed 5/10/19.  E) 6/15/2019 she started adjuvant Xeloda.   F) PET scan on 2/19/2019 showed persistent hypermetabolic focus adjacent to the left implant.  On 1/21/2020 she underwent wide excision which showed infiltrating, poorly differentiated ductal adenocarcinoma with positive margins, specimen with extended margins negative  G) PET scan August 25 2020 showed hypermetabolic uptake in chest wall.  On 9/11/20, this was excised, with pathology showing a 2.1 cm poorly differentiated carcinoma, margins negative.  ER weakly positive, MO -, Her2-  H) adjuvant chemotherapy with carboplatin and gemzar started 11/11/20.  Admitted with Covid19 infection and neutropenic fever after cycle 1.  Cycle 2 delayed until 12/30/20.  Completed 4 cycles.  I) chest wall recurrence - resected 7/9/21.  Pathology showed recurrent ER weakly +, MO -, Her2- IDC.  Margins negative. Started on lupron and anastrozole.  Lupron stopped after ELENA/BSO November 2023.  2. Left upper extremity DVT, diagnosed March 2021.  Treated with eliquis.      Subjective      Chief Complaint: follow up breast cancer     HISTORY OF  "PRESENT ILLNESS:   Ina Ram returns for follow-up.  She is feeling okay.  She has had a few bladder infections recently.  He currently is on antibiotics for this.  She currently is on antibiotics for this.  She says she does not really notice vaginal dryness otherwise.    Objective      /74   Pulse 76   Temp 97.1 °F (36.2 °C) (Infrared)   Ht 175.3 cm (69.02\")   Wt 80.3 kg (177 lb)   SpO2 99%   BMI 26.13 kg/m²    Vitals:    06/05/25 1419   PainSc: 0-No pain               Performance Status: 0    General: well appearing female in no acute distress  Neuro: alert and oriented  HEENT: sclera anicteric, oropharynx clear  Left chest wall: incision well healed.  No significant erythema or masses  Extremeties: no lower extremity edema  Skin: no rashes, lesions, bruising, or petechiae  Psych: mood and affect appropriate    I have reexamined the patient and the results are consistent with the previously documented exam. Fifi Malloy MD        CT Abdomen Pelvis With Contrast  Narrative: CT CHEST W CONTRAST DIAGNOSTIC, CT ABDOMEN PELVIS W CONTRAST    Date of Exam: 6/5/2025 12:54 PM EDT    Indication: breast cancer.    Comparison: 11/21/2024 CT    Technique: Axial CT images were obtained of the chest, abdomen, and pelvis after the uneventful intravenous administration of 85 and Isovue-300.  Reconstructed coronal and sagittal images were also obtained. Automated exposure control and iterative   construction methods were used.    Findings:  CT chest:  The central airways are patent.  There is no significant bronchial wall thickening or bronchiectasis. Stable mild fibrosis in the left lung apex. Hypoventilatory changes. No focal airspace consolidation.  No pleural effusion or pneumothorax.    No suspicious pulmonary nodule or mass.    No mediastinal mass or suspicious lymphadenopathy.  Heart is normal in size.  No pericardial effusion. No central pulmonary embolism. Minimal coronary artery " calcification.    Chest wall soft tissues show no acute abnormality. Surgical changes of mastectomy with right breast prosthesis..  No fracture or suspicious osseous lesion.    CT abdomen/pelvis:  There is no suspicious hepatic lesion.    Gallbladder is unremarkable.    The spleen, pancreas, and bilateral adrenal glands are unremarkable.    There is no hydronephrosis or nephrolithiasis. No suspicious renal lesion.    There is no evidence of bowel obstruction. Normal appendix.    No suspicious lymphadenopathy. No abdominal aortic aneurysm.    Urinary bladder is unremarkable. Hysterectomy.    Abdominal and pelvic wall soft tissues show no acute abnormality. There is no fracture or suspicious osseous lesion.     Impression: Impression:  No evidence of metastatic disease in the chest, abdomen, or pelvis.    Electronically Signed: Grant Villatoro MD    6/5/2025 2:13 PM EDT    Workstation ID: [id number]  CT Chest With Contrast Diagnostic  Narrative: CT CHEST W CONTRAST DIAGNOSTIC, CT ABDOMEN PELVIS W CONTRAST    Date of Exam: 6/5/2025 12:54 PM EDT    Indication: breast cancer.    Comparison: 11/21/2024 CT    Technique: Axial CT images were obtained of the chest, abdomen, and pelvis after the uneventful intravenous administration of 85 and Isovue-300.  Reconstructed coronal and sagittal images were also obtained. Automated exposure control and iterative   construction methods were used.    Findings:  CT chest:  The central airways are patent.  There is no significant bronchial wall thickening or bronchiectasis. Stable mild fibrosis in the left lung apex. Hypoventilatory changes. No focal airspace consolidation.  No pleural effusion or pneumothorax.    No suspicious pulmonary nodule or mass.    No mediastinal mass or suspicious lymphadenopathy.  Heart is normal in size.  No pericardial effusion. No central pulmonary embolism. Minimal coronary artery calcification.    Chest wall soft tissues show no acute abnormality. Surgical  changes of mastectomy with right breast prosthesis..  No fracture or suspicious osseous lesion.    CT abdomen/pelvis:  There is no suspicious hepatic lesion.    Gallbladder is unremarkable.    The spleen, pancreas, and bilateral adrenal glands are unremarkable.    There is no hydronephrosis or nephrolithiasis. No suspicious renal lesion.    There is no evidence of bowel obstruction. Normal appendix.    No suspicious lymphadenopathy. No abdominal aortic aneurysm.    Urinary bladder is unremarkable. Hysterectomy.    Abdominal and pelvic wall soft tissues show no acute abnormality. There is no fracture or suspicious osseous lesion.     Impression: Impression:  No evidence of metastatic disease in the chest, abdomen, or pelvis.    Electronically Signed: Grant Villatoro MD    6/5/2025 2:13 PM EDT    Workstation ID: [id number]    I personally reviewed the imaging studies.      Assessment & Plan   Ina Ram is a 45 y.o. year old female with a stage II B ER weakly positive HER-2 negative breast cancer who returns for follow up.       Her CT scan  shows no evidence of disease recurrence.  We will continue anastrozole for a minimum of 5 years.    Recurrent UTI: We discussed that there can be an increased risk for UTI with vaginal dryness related to menopause and aromatase inhibitor treatment.  We discussed the option of vaginal estrogen.  She does not want to do this currently but may consider it if the urinary tract infections continue.    Follow-up in 6 months with repeat CT imaging.  We will continue scans every 6 months until 5 years from her last surgery.              Fifi Malloy MD  [facility]    6/5/2025

## 2025-06-05 NOTE — TELEPHONE ENCOUNTER
Caller: DASHA    Relationship: Other    Best call back number: [phone]    What is the best time to reach you: ANYTIME    Who are you requesting to speak with (clinical staff, provider,  specific staff member): CLINICAL     What was the call regarding: DASHA NEEDS A WRITTEN SCRIPT FOR 3 MASTECTOMY BRAS. DATE 6/5/25. CODE .    FAX # [phone].

## (undated) DEVICE — GOWN,NON-REINFORCED,SIRUS,SET IN SLV,XL: Brand: MEDLINE

## (undated) DEVICE — SPNG GZ WOVN 4X4IN 12PLY 10/BX STRL

## (undated) DEVICE — BRA COMPR SURG STL958 XL

## (undated) DEVICE — PK CHST BRST 10

## (undated) DEVICE — IRRIGATOR BULB ASEPTO 60CC STRL

## (undated) DEVICE — PROXIMATE RH ROTATING HEAD SKIN STAPLERS (35 WIDE) CONTAINS 35 STAINLESS STEEL STAPLES: Brand: PROXIMATE

## (undated) DEVICE — HDRST POSTIN FM CRDL TRACH SLOT 9X8X4IN

## (undated) DEVICE — SUT PDS 2/0 CT2 27IN VIL PDP333H

## (undated) DEVICE — SUT MNCRYL 3/0 SH 27IN UD MCP416H

## (undated) DEVICE — COVER,LIGHT HANDLE,FLX,1/PK: Brand: MEDLINE INDUSTRIES, INC.

## (undated) DEVICE — GLV SURG BIOGEL LTX PF 7 1/2

## (undated) DEVICE — SUT SILK 3/0 TIES 18IN A184H

## (undated) DEVICE — ELECTRD BLD EDGE/INSUL1P 2.4X5.1MM STRL

## (undated) DEVICE — LEX GENERAL BREAST: Brand: MEDLINE INDUSTRIES, INC.

## (undated) DEVICE — SUT SILK 2/0 SH 30IN K833H

## (undated) DEVICE — DISH PETRI 3.5IN MD STRL LF

## (undated) DEVICE — ANTIBACTERIAL UNDYED BRAIDED (POLYGLACTIN 910), SYNTHETIC ABSORBABLE SUTURE: Brand: COATED VICRYL

## (undated) DEVICE — SUT SILK 2/0 PS 18IN 1588H

## (undated) DEVICE — BIOPATCH™ ANTIMICROBIAL DRESSING WITH CHLORHEXIDINE GLUCONATE IS A HYDROPHILLIC POLYURETHANE ABSORPTIVE FOAM WITH CHLORHEXIDINE GLUCONATE (CHG) WHICH INHIBITS BACTERIAL GROWTH UNDER THE DRESSING. THE DRESSING IS INTENDED TO BE USED TO ABSORB EXUDATE, COVER A WOUND CAUSED BY VASCULAR AND NONVASCULAR PERCUTANEOUS MEDICAL DEVICES DURING SURGERY, AS WELL AS REDUCE LOCAL INFECTION AND COLONIZATION OF MICROORGANISMS.: Brand: BIOPATCH

## (undated) DEVICE — GLV SURG TRIUMPH ORTHO W/ALOE PF LTX 7.5 STRL

## (undated) DEVICE — SUT MNCRYL PLS ANTIB UD 4/0 PS2 18IN

## (undated) DEVICE — DRSNG WND BORDR/ADHS NONADHR/GZ LF 2X2IN STRL

## (undated) DEVICE — BANDAGE,GAUZE,BULKEE II,4.5"X4.1YD,STRL: Brand: MEDLINE

## (undated) DEVICE — SUT MNCRYL PLS ANTIB UD 3/0 PS2 27IN

## (undated) DEVICE — CAMERA/LASER ARM DRAPE: Brand: DEROYAL

## (undated) DEVICE — SUT ETHLN 3/0 PC5 18IN 1893G

## (undated) DEVICE — JACKSON-PRATT 100CC BULB RESERVOIR: Brand: CARDINAL HEALTH

## (undated) DEVICE — BLAKE SILICONE DRAIN, 15 FR ROUND, HUBLESS WITH 3/16" TROCAR: Brand: BLAKE

## (undated) DEVICE — DECANT BG O JET

## (undated) DEVICE — BRA COMPR SURG STL958 LG

## (undated) DEVICE — PREVENA DUO PEEL & PLACE SYSTEM KIT- 13 CM: Brand: PREVENA DUO™ PEEL & PLACE™

## (undated) DEVICE — DRSNG GZ CURAD XEROFORM NONADHR OVERWRAP 5X9IN

## (undated) DEVICE — SKIN AFFIX SURG ADHESIVE 72/CS 0.55ML: Brand: MEDLINE

## (undated) DEVICE — ELECTRD NDL EDGE/INSUL/PFTE.787MM 2.84IN

## (undated) DEVICE — ELECTRD BLD EXT EDGE/INSUL 1P 4IN

## (undated) DEVICE — DEV DEL BRST/IMP GEL KELLER2 FUNNEL

## (undated) DEVICE — 3M™ STERI-STRIP™ REINFORCED ADHESIVE SKIN CLOSURES, R1547, 1/2 IN X 4 IN (12 MM X 100 MM), 6 STRIPS/ENVELOPE: Brand: 3M™ STERI-STRIP™

## (undated) DEVICE — LEX GENERAL ABDOMINAL SPLIT: Brand: MEDLINE INDUSTRIES, INC.

## (undated) DEVICE — TRY SKINPREP DRYPREP

## (undated) DEVICE — SUT ETHLN 3/0 FS1 30IN 669H

## (undated) DEVICE — APPL CHLORAPREP W/TINT 26ML BLU

## (undated) DEVICE — PK MINOR SPLT 10

## (undated) DEVICE — NDL HYPO ECLPS SFTY 18G 1 1/2IN

## (undated) DEVICE — SYR LUERLOK 50ML

## (undated) DEVICE — SPNG LAP PREWSH SFTPK 18X18IN STRL PK/5

## (undated) DEVICE — DRAIN JACKSON PRATT ROUND 15FR: Brand: CARDINAL HEALTH

## (undated) DEVICE — STERILE PVP: Brand: MEDLINE INDUSTRIES, INC.

## (undated) DEVICE — SYS SKIN CLS DERMABOND PRINEO W/22CM MESH TP

## (undated) DEVICE — VLV 4 NDL/FREE SAFESITE W CAP ASP/INJ